# Patient Record
Sex: FEMALE | Race: WHITE | NOT HISPANIC OR LATINO | Employment: OTHER | URBAN - METROPOLITAN AREA
[De-identification: names, ages, dates, MRNs, and addresses within clinical notes are randomized per-mention and may not be internally consistent; named-entity substitution may affect disease eponyms.]

---

## 2023-03-06 DIAGNOSIS — R19.7 DIARRHEA, UNSPECIFIED TYPE: Primary | ICD-10-CM

## 2023-03-06 RX ORDER — DIPHENOXYLATE HYDROCHLORIDE AND ATROPINE SULFATE 2.5; .025 MG/1; MG/1
2 TABLET ORAL EVERY 4 HOURS PRN
COMMUNITY
Start: 2020-12-18 | End: 2023-03-06 | Stop reason: SDUPTHER

## 2023-03-06 RX ORDER — DIPHENOXYLATE HYDROCHLORIDE AND ATROPINE SULFATE 2.5; .025 MG/1; MG/1
2 TABLET ORAL 3 TIMES DAILY
Qty: 90 TABLET | Refills: 0 | Status: SHIPPED | OUTPATIENT
Start: 2023-03-06

## 2023-05-09 LAB
ANION GAP IN SER/PLAS: 14 MMOL/L (ref 10–20)
CALCIDIOL (25 OH VITAMIN D3) (NG/ML) IN SER/PLAS: 77 NG/ML
CALCIUM (MG/DL) IN SER/PLAS: 9.5 MG/DL (ref 8.6–10.3)
CARBON DIOXIDE, TOTAL (MMOL/L) IN SER/PLAS: 27 MMOL/L (ref 21–32)
CHLORIDE (MMOL/L) IN SER/PLAS: 95 MMOL/L (ref 98–107)
CREATININE (MG/DL) IN SER/PLAS: 0.75 MG/DL (ref 0.5–1.05)
GFR FEMALE: 85 ML/MIN/1.73M2
GLUCOSE (MG/DL) IN SER/PLAS: 89 MG/DL (ref 74–99)
POTASSIUM (MMOL/L) IN SER/PLAS: 4.5 MMOL/L (ref 3.5–5.3)
SODIUM (MMOL/L) IN SER/PLAS: 131 MMOL/L (ref 136–145)
THYROTROPIN (MIU/L) IN SER/PLAS BY DETECTION LIMIT <= 0.05 MIU/L: 0.56 MIU/L (ref 0.44–3.98)
UREA NITROGEN (MG/DL) IN SER/PLAS: 10 MG/DL (ref 6–23)

## 2023-05-17 PROBLEM — M75.52 BURSITIS OF LEFT SHOULDER: Status: ACTIVE | Noted: 2023-05-17

## 2023-05-17 PROBLEM — H91.90 HEARING LOSS: Status: ACTIVE | Noted: 2023-05-17

## 2023-05-17 PROBLEM — K21.9 ESOPHAGEAL REFLUX: Status: ACTIVE | Noted: 2023-05-17

## 2023-05-17 PROBLEM — I10 HYPERTENSION: Status: ACTIVE | Noted: 2023-05-17

## 2023-05-17 PROBLEM — M70.72 HIP BURSITIS, LEFT: Status: ACTIVE | Noted: 2023-05-17

## 2023-05-17 PROBLEM — E53.8 VITAMIN B12 DEFICIENCY: Status: ACTIVE | Noted: 2023-05-17

## 2023-05-17 PROBLEM — K76.0 FATTY INFILTRATION OF LIVER: Status: ACTIVE | Noted: 2023-05-17

## 2023-05-17 PROBLEM — M17.10 OSTEOARTHRITIS, LOCALIZED, KNEE: Status: ACTIVE | Noted: 2023-05-17

## 2023-05-17 PROBLEM — H34.8392 BRVO (BRANCH RETINAL VEIN OCCLUSION) (CMS-HCC): Status: ACTIVE | Noted: 2023-05-17

## 2023-05-17 PROBLEM — R73.09 INCREASED GLUCOSE LEVEL: Status: ACTIVE | Noted: 2023-05-17

## 2023-05-17 PROBLEM — M85.80 OSTEOPENIA: Status: ACTIVE | Noted: 2023-05-17

## 2023-05-17 PROBLEM — S23.9XXA THORACIC SPRAIN: Status: ACTIVE | Noted: 2023-05-17

## 2023-05-17 PROBLEM — K80.20 CHOLELITHIASIS: Status: ACTIVE | Noted: 2023-05-17

## 2023-05-17 PROBLEM — G47.00 INSOMNIA: Status: ACTIVE | Noted: 2023-05-17

## 2023-05-17 PROBLEM — H02.834 DERMATOCHALASIS OF LEFT UPPER EYELID: Status: ACTIVE | Noted: 2023-05-17

## 2023-05-17 PROBLEM — H18.529 CORNEAL EPITHELIAL AND BASEMENT MEMBRANE DYSTROPHY: Status: ACTIVE | Noted: 2023-05-17

## 2023-05-17 PROBLEM — L30.9 DERMATITIS: Status: ACTIVE | Noted: 2023-05-17

## 2023-05-17 PROBLEM — H60.92 OTITIS EXTERNA, LEFT: Status: ACTIVE | Noted: 2023-05-17

## 2023-05-17 PROBLEM — J30.9 ALLERGIC RHINITIS: Status: ACTIVE | Noted: 2023-05-17

## 2023-05-17 PROBLEM — Z86.010 HISTORY OF COLON POLYPS: Status: ACTIVE | Noted: 2023-05-17

## 2023-05-17 PROBLEM — K52.9 COLITIS, ACUTE: Status: ACTIVE | Noted: 2023-05-17

## 2023-05-17 PROBLEM — E78.5 HYPERLIPIDEMIA: Status: ACTIVE | Noted: 2023-05-17

## 2023-05-17 PROBLEM — M62.830 LUMBAR PARASPINAL MUSCLE SPASM: Status: ACTIVE | Noted: 2023-05-17

## 2023-05-17 PROBLEM — D64.9 ANEMIA: Status: ACTIVE | Noted: 2023-05-17

## 2023-05-17 PROBLEM — E66.3 OVERWEIGHT: Status: ACTIVE | Noted: 2023-05-17

## 2023-05-17 PROBLEM — M16.9 LOCALIZED OSTEOARTHRITIS OF HIP: Status: ACTIVE | Noted: 2023-05-17

## 2023-05-17 PROBLEM — S82.142A CLOSED FRACTURE OF LEFT TIBIAL PLATEAU: Status: ACTIVE | Noted: 2023-05-17

## 2023-05-17 PROBLEM — L30.9 ECZEMA: Status: ACTIVE | Noted: 2023-05-17

## 2023-05-17 PROBLEM — K58.2 MIXED IRRITABLE BOWEL SYNDROME: Status: ACTIVE | Noted: 2023-05-17

## 2023-05-17 PROBLEM — M25.812 SHOULDER IMPINGEMENT, LEFT: Status: ACTIVE | Noted: 2023-05-17

## 2023-05-17 PROBLEM — F41.8 SITUATIONAL ANXIETY: Status: ACTIVE | Noted: 2023-05-17

## 2023-05-17 PROBLEM — Z86.0100 HISTORY OF COLON POLYPS: Status: ACTIVE | Noted: 2023-05-17

## 2023-05-17 PROBLEM — F43.21 SITUATIONAL DEPRESSION: Status: ACTIVE | Noted: 2023-05-17

## 2023-05-17 PROBLEM — G89.18 POSTOPERATIVE PAIN: Status: ACTIVE | Noted: 2023-05-17

## 2023-05-17 PROBLEM — C44.91 BASAL CELL CARCINOMA: Status: ACTIVE | Noted: 2023-05-17

## 2023-05-17 PROBLEM — H90.3 SENSORINEURAL HEARING LOSS (SNHL) OF BOTH EARS: Status: ACTIVE | Noted: 2023-05-17

## 2023-05-17 PROBLEM — L71.9 ROSACEA: Status: ACTIVE | Noted: 2023-05-17

## 2023-05-17 PROBLEM — K61.0 PERIANAL ABSCESS: Status: ACTIVE | Noted: 2023-05-17

## 2023-05-17 PROBLEM — Z97.3 WEARS GLASSES: Status: ACTIVE | Noted: 2023-05-17

## 2023-05-17 PROBLEM — E03.9 HYPOTHYROIDISM: Status: ACTIVE | Noted: 2023-05-17

## 2023-05-17 PROBLEM — R11.0 NAUSEA IN ADULT: Status: ACTIVE | Noted: 2023-05-17

## 2023-05-17 PROBLEM — Z78.9 PATIENT HAS LIVING WILL: Status: ACTIVE | Noted: 2023-05-17

## 2023-05-17 PROBLEM — H02.831 DERMATOCHALASIS OF RIGHT UPPER EYELID: Status: ACTIVE | Noted: 2023-05-17

## 2023-05-17 PROBLEM — E55.9 VITAMIN D DEFICIENCY: Status: ACTIVE | Noted: 2023-05-17

## 2023-05-17 PROBLEM — K46.9 HERNIA, ABDOMINAL: Status: ACTIVE | Noted: 2023-05-17

## 2023-05-18 PROBLEM — E87.1 HYPONATREMIA: Status: ACTIVE | Noted: 2023-05-18

## 2023-05-19 RX ORDER — LANOLIN ALCOHOL/MO/W.PET/CERES
CREAM (GRAM) TOPICAL
COMMUNITY
Start: 2022-10-27 | End: 2023-05-22 | Stop reason: DRUGHIGH

## 2023-05-19 RX ORDER — ESCITALOPRAM OXALATE 10 MG/1
1 TABLET ORAL DAILY
COMMUNITY
Start: 2022-03-14 | End: 2023-08-07

## 2023-05-19 RX ORDER — LEVOTHYROXINE SODIUM 150 UG/1
TABLET ORAL
COMMUNITY
Start: 2012-09-10 | End: 2023-05-22 | Stop reason: SDUPTHER

## 2023-05-19 RX ORDER — ACETAMINOPHEN 500 MG
2 TABLET ORAL DAILY
COMMUNITY
Start: 2013-03-19

## 2023-05-19 RX ORDER — OMEPRAZOLE 40 MG/1
1 CAPSULE, DELAYED RELEASE ORAL 2 TIMES DAILY
COMMUNITY
Start: 2020-11-01 | End: 2024-03-12 | Stop reason: SDUPTHER

## 2023-05-19 RX ORDER — OMEGA-3/DHA/EPA/FISH OIL 300-1000MG
1 CAPSULE,DELAYED RELEASE (ENTERIC COATED) ORAL DAILY
COMMUNITY
Start: 2013-03-21 | End: 2024-04-19 | Stop reason: HOSPADM

## 2023-05-19 RX ORDER — NYSTATIN 100000 [USP'U]/G
POWDER TOPICAL
COMMUNITY
Start: 2020-03-04 | End: 2023-05-22 | Stop reason: ALTCHOICE

## 2023-05-19 RX ORDER — TACROLIMUS 1 MG/G
OINTMENT TOPICAL
COMMUNITY
Start: 2021-07-13 | End: 2024-04-05 | Stop reason: ENTERED-IN-ERROR

## 2023-05-19 RX ORDER — LORAZEPAM 0.5 MG/1
TABLET ORAL
COMMUNITY
Start: 2015-12-02 | End: 2023-05-22 | Stop reason: SDUPTHER

## 2023-05-19 RX ORDER — ATORVASTATIN CALCIUM 10 MG/1
1 TABLET, FILM COATED ORAL DAILY
COMMUNITY
Start: 2012-10-12 | End: 2024-02-06 | Stop reason: SDUPTHER

## 2023-05-19 RX ORDER — LISINOPRIL 10 MG/1
1 TABLET ORAL DAILY
COMMUNITY
Start: 2021-06-21 | End: 2023-06-26 | Stop reason: SDUPTHER

## 2023-05-19 RX ORDER — LEVOTHYROXINE SODIUM 125 UG/1
TABLET ORAL
COMMUNITY
Start: 2012-09-10 | End: 2023-12-12 | Stop reason: SDUPTHER

## 2023-05-19 RX ORDER — ONDANSETRON 4 MG/1
1 TABLET, FILM COATED ORAL EVERY 8 HOURS PRN
COMMUNITY
Start: 2020-03-17

## 2023-05-22 ENCOUNTER — OFFICE VISIT (OUTPATIENT)
Dept: PRIMARY CARE | Facility: CLINIC | Age: 73
End: 2023-05-22
Payer: MEDICARE

## 2023-05-22 VITALS
WEIGHT: 196.2 LBS | DIASTOLIC BLOOD PRESSURE: 83 MMHG | TEMPERATURE: 97.3 F | OXYGEN SATURATION: 96 % | SYSTOLIC BLOOD PRESSURE: 130 MMHG | BODY MASS INDEX: 33.49 KG/M2 | RESPIRATION RATE: 16 BRPM | HEIGHT: 64 IN | HEART RATE: 81 BPM

## 2023-05-22 DIAGNOSIS — E78.2 MIXED HYPERLIPIDEMIA: ICD-10-CM

## 2023-05-22 DIAGNOSIS — E53.8 VITAMIN B12 DEFICIENCY: ICD-10-CM

## 2023-05-22 DIAGNOSIS — E55.9 VITAMIN D DEFICIENCY: ICD-10-CM

## 2023-05-22 DIAGNOSIS — M85.89 OSTEOPENIA OF MULTIPLE SITES: ICD-10-CM

## 2023-05-22 DIAGNOSIS — E66.9 CLASS 1 OBESITY WITH SERIOUS COMORBIDITY AND BODY MASS INDEX (BMI) OF 34.0 TO 34.9 IN ADULT, UNSPECIFIED OBESITY TYPE: ICD-10-CM

## 2023-05-22 DIAGNOSIS — Z00.00 ROUTINE GENERAL MEDICAL EXAMINATION AT HEALTH CARE FACILITY: Primary | ICD-10-CM

## 2023-05-22 DIAGNOSIS — F40.298 FEAR OF FALLING: ICD-10-CM

## 2023-05-22 DIAGNOSIS — R03.0 ELEVATED BLOOD PRESSURE READING IN OFFICE WITHOUT DIAGNOSIS OF HYPERTENSION: ICD-10-CM

## 2023-05-22 DIAGNOSIS — M17.10 OSTEOARTHRITIS, LOCALIZED, KNEE: ICD-10-CM

## 2023-05-22 DIAGNOSIS — F43.21 SITUATIONAL DEPRESSION: ICD-10-CM

## 2023-05-22 DIAGNOSIS — E87.1 HYPONATREMIA: ICD-10-CM

## 2023-05-22 DIAGNOSIS — E03.9 ACQUIRED HYPOTHYROIDISM: ICD-10-CM

## 2023-05-22 PROBLEM — I10 BENIGN ESSENTIAL HYPERTENSION: Status: ACTIVE | Noted: 2019-12-18

## 2023-05-22 PROBLEM — M79.609 PAIN IN EXTREMITY: Status: ACTIVE | Noted: 2019-12-18

## 2023-05-22 PROBLEM — K43.2 VENTRAL INCISIONAL HERNIA: Status: ACTIVE | Noted: 2018-01-08

## 2023-05-22 PROBLEM — E87.6 HYPOKALEMIA: Status: RESOLVED | Noted: 2019-12-18 | Resolved: 2023-05-22

## 2023-05-22 PROBLEM — L57.4 SKIN LAXITY: Status: ACTIVE | Noted: 2018-03-16

## 2023-05-22 PROBLEM — L02.219 CELLULITIS AND ABSCESS OF TRUNK: Status: ACTIVE | Noted: 2019-12-18

## 2023-05-22 PROBLEM — L03.319 CELLULITIS AND ABSCESS OF TRUNK: Status: ACTIVE | Noted: 2019-12-18

## 2023-05-22 PROBLEM — E87.6 HYPOKALEMIA: Status: ACTIVE | Noted: 2019-12-18

## 2023-05-22 PROBLEM — Z98.890 H/O ABDOMINOPLASTY: Status: ACTIVE | Noted: 2018-04-11

## 2023-05-22 PROCEDURE — 1170F FXNL STATUS ASSESSED: CPT | Performed by: INTERNAL MEDICINE

## 2023-05-22 PROCEDURE — 3008F BODY MASS INDEX DOCD: CPT | Performed by: INTERNAL MEDICINE

## 2023-05-22 PROCEDURE — G0444 DEPRESSION SCREEN ANNUAL: HCPCS | Performed by: INTERNAL MEDICINE

## 2023-05-22 PROCEDURE — 3075F SYST BP GE 130 - 139MM HG: CPT | Performed by: INTERNAL MEDICINE

## 2023-05-22 PROCEDURE — 1160F RVW MEDS BY RX/DR IN RCRD: CPT | Performed by: INTERNAL MEDICINE

## 2023-05-22 PROCEDURE — 93000 ELECTROCARDIOGRAM COMPLETE: CPT | Performed by: INTERNAL MEDICINE

## 2023-05-22 PROCEDURE — 1159F MED LIST DOCD IN RCRD: CPT | Performed by: INTERNAL MEDICINE

## 2023-05-22 PROCEDURE — G0439 PPPS, SUBSEQ VISIT: HCPCS | Performed by: INTERNAL MEDICINE

## 2023-05-22 PROCEDURE — 1036F TOBACCO NON-USER: CPT | Performed by: INTERNAL MEDICINE

## 2023-05-22 PROCEDURE — 99214 OFFICE O/P EST MOD 30 MIN: CPT | Performed by: INTERNAL MEDICINE

## 2023-05-22 PROCEDURE — 3079F DIAST BP 80-89 MM HG: CPT | Performed by: INTERNAL MEDICINE

## 2023-05-22 RX ORDER — MULTIVITAMIN/IRON/FOLIC ACID 18MG-0.4MG
1 TABLET ORAL DAILY
COMMUNITY
End: 2024-03-12 | Stop reason: WASHOUT

## 2023-05-22 RX ORDER — LORAZEPAM 0.5 MG/1
0.5 TABLET ORAL DAILY PRN
Qty: 60 TABLET | Refills: 0 | Status: SHIPPED | OUTPATIENT
Start: 2023-05-22 | End: 2023-09-18 | Stop reason: SDUPTHER

## 2023-05-22 RX ORDER — POLYPODIUM LEUCOTOMOS EXTRACT 240 MG
1 CAPSULE ORAL DAILY
COMMUNITY

## 2023-05-22 RX ORDER — LANOLIN ALCOHOL/MO/W.PET/CERES
1000 CREAM (GRAM) TOPICAL DAILY
Status: SHIPPED | COMMUNITY
Start: 2023-05-22 | End: 2024-05-16

## 2023-05-22 ASSESSMENT — PATIENT HEALTH QUESTIONNAIRE - PHQ9
1. LITTLE INTEREST OR PLEASURE IN DOING THINGS: NOT AT ALL
2. FEELING DOWN, DEPRESSED OR HOPELESS: NOT AT ALL
1. LITTLE INTEREST OR PLEASURE IN DOING THINGS: NOT AT ALL
SUM OF ALL RESPONSES TO PHQ9 QUESTIONS 1 AND 2: 0
SUM OF ALL RESPONSES TO PHQ9 QUESTIONS 1 AND 2: 0
2. FEELING DOWN, DEPRESSED OR HOPELESS: NOT AT ALL

## 2023-05-22 ASSESSMENT — ANXIETY QUESTIONNAIRES
5. BEING SO RESTLESS THAT IT IS HARD TO SIT STILL: NOT AT ALL
IF YOU CHECKED OFF ANY PROBLEMS ON THIS QUESTIONNAIRE, HOW DIFFICULT HAVE THESE PROBLEMS MADE IT FOR YOU TO DO YOUR WORK, TAKE CARE OF THINGS AT HOME, OR GET ALONG WITH OTHER PEOPLE: SOMEWHAT DIFFICULT
GAD7 TOTAL SCORE: 3
3. WORRYING TOO MUCH ABOUT DIFFERENT THINGS: SEVERAL DAYS
7. FEELING AFRAID AS IF SOMETHING AWFUL MIGHT HAPPEN: SEVERAL DAYS
6. BECOMING EASILY ANNOYED OR IRRITABLE: NOT AT ALL
2. NOT BEING ABLE TO STOP OR CONTROL WORRYING: NOT AT ALL
1. FEELING NERVOUS, ANXIOUS, OR ON EDGE: SEVERAL DAYS
4. TROUBLE RELAXING: NOT AT ALL

## 2023-05-22 ASSESSMENT — ACTIVITIES OF DAILY LIVING (ADL)
GROOMING: INDEPENDENT
PATIENT'S MEMORY ADEQUATE TO SAFELY COMPLETE DAILY ACTIVITIES?: YES
ASSISTIVE_DEVICE: EYEGLASSES
JUDGMENT_ADEQUATE_SAFELY_COMPLETE_DAILY_ACTIVITIES: YES
WALKS IN HOME: INDEPENDENT
HEARING - RIGHT EAR: FUNCTIONAL
HEARING - LEFT EAR: FUNCTIONAL
BATHING: INDEPENDENT
ADEQUATE_TO_COMPLETE_ADL: YES
TOILETING: INDEPENDENT
DRESSING YOURSELF: INDEPENDENT
FEEDING YOURSELF: INDEPENDENT

## 2023-05-22 ASSESSMENT — ENCOUNTER SYMPTOMS
DEPRESSION: 0
LOSS OF SENSATION IN FEET: 0
OCCASIONAL FEELINGS OF UNSTEADINESS: 0

## 2023-05-22 NOTE — PROGRESS NOTES
"Subjective   Reason for Visit: Yamilex Ye is an 72 y.o. female here for a Medicare Wellness visit.     Past Medical, Surgical, and Family History reviewed and updated in chart.    Reviewed all medications by prescribing practitioner or clinical pharmacist (such as prescriptions, OTCs, herbal therapies and supplements) and documented in the medical record.    Here for follow up and wellness visit  Overall doing well. No illnesses  Fear of fallingcontinues to be a problem  Working w/ a  - Vel        Patient Care Team:  Bhavik Tristan MD as PCP - General  Bhavik Tristan MD as PCP - [facility] Attributed Provider     Review of Systems    Objective   Vitals:  /83 (BP Location: Left arm, Patient Position: Sitting, BP Cuff Size: Large adult)   Pulse 81   Temp 36.3 °C (97.3 °F)   Resp 16   Ht 1.613 m (5' 3.5\")   Wt 89 kg (196 lb 3.2 oz)   SpO2 96%   BMI 34.21 kg/m²       Physical Exam  Vitals reviewed.   Constitutional:       Appearance: Normal appearance.   HENT:      Head: Normocephalic and atraumatic.   Eyes:      General: No scleral icterus.        Right eye: No discharge.         Left eye: No discharge.      Extraocular Movements: Extraocular movements intact.      Conjunctiva/sclera: Conjunctivae normal.      Pupils: Pupils are equal, round, and reactive to light.   Cardiovascular:      Rate and Rhythm: Normal rate and regular rhythm.      Pulses: Normal pulses.      Heart sounds: Normal heart sounds. No murmur heard.  Pulmonary:      Effort: Pulmonary effort is normal.      Breath sounds: Normal breath sounds. No wheezing or rhonchi.   Musculoskeletal:         General: No deformity or signs of injury. Normal range of motion.      Cervical back: Normal range of motion and neck supple. No rigidity or tenderness.   Lymphadenopathy:      Cervical: No cervical adenopathy.   Skin:     General: Skin is warm and dry.      Findings: No rash.   Neurological:      General: No focal deficit present.      " Mental Status: She is alert and oriented to person, place, and time. Mental status is at baseline.      Cranial Nerves: No cranial nerve deficit.      Sensory: No sensory deficit.      Gait: Gait abnormal.      Comments: Walked cautiously   Psychiatric:         Mood and Affect: Mood normal.         Behavior: Behavior normal.         Thought Content: Thought content normal.         Judgment: Judgment normal.         Assessment/Plan   Problem List Items Addressed This Visit          Musculoskeletal    Osteoarthritis, localized, knee    Osteopenia       Endocrine/Metabolic    Hypothyroidism    Vitamin B12 deficiency    Vitamin D deficiency       Other    Hyperlipidemia    Situational depression    Hyponatremia    Relevant Orders    Follow Up In [facility] - PCP     Other Visit Diagnoses       Routine general medical examination at health care facility    -  Primary    Relevant Orders    1 Year Follow Up In [facility] - PCP - Wellness Exam    Class 1 obesity with serious comorbidity and body mass index (BMI) of 34.0 to 34.9 in adult, unspecified obesity type        Elevated blood pressure reading in office without diagnosis of hypertension        Relevant Orders    ECG 12 lead (Clinic Performed)    Follow Up In [facility] - PCP    Fear of falling        Relevant Medications    LORazepam (Ativan) 0.5 mg tablet               Benzodiazepines:  What is the patient's goal of therapy?   Improve anxiety  Is this being achieved with current treatment? Yes, with medication      CSA:  10/17/22   PDMP: 5/22/23  UDS:  12/20/2020   ADRIANE-7:   5/22/23  (score =3)    Activities of Daily Living:   Is your overall impression that this patient is benefiting (symptom reduction outweighs side effects) from benzodiazepine therapy? Yes     1. Physical Functioning: Better  2. Family Relationship: Better  3. Social Relationship: Better  4. Mood: Better  5. Sleep Patterns: Better  6. Overall Function:  Better      Hypertension/ dyslipidemia/elevated glucose/elevated weight-presently blood pressure appears controlled with present medication. We'll continue to follow each visit, with surveillance chemistry labs regularly.             BP improved on recheck.     Hyponatremia- mild; will follow     Elevated 10 year cardiac risk assessment- 10 year cardiac risk assessment at 9.9% January 2019 reduces to 7.4% taking statin, 5.4% with statin and blood pressure Rx. She will continue lisinopril and atorvastatin. She will continue exercise when her schedule permits     Exercise routine-she will advance as tolerated.    She restarted with her . She has joined the gym.    Her  switched gyms and she will be starting kick boxing with a new instructor.    With present knee physical therapy, she has put regular exercising on hold.              She has been using a - Vel, and hopes to restart Silver Sneakers when she is able tolerate it.   Encouraged water walking 3 x wk at [facility]'s pool     Left knee injury-November 2022-she has worked with Dr. Israel who did an MRI which shows nondisplaced tibial fracture at the insertion of the PCL, possibly a meniscal tear as well as patellar arthritis. She is doing physical therapy. Presently using a cane. She will continue caution. Handicap parking placard provided January 2023            Working w/  in 5/23 -Vel - including lower and upper body weights     Recurrent diarrhea alternating with constipation- initially diagnosed as colitis- noted on early 2020 colonoscopy per Dr. Ibarra. Lexapro was discontinued. She has used Lomotil as needed from Dr. Ibarra. Colonoscopy updated January 2021- significantly improved she states.  With less stress, there is less diarrhea she notes. Overall improved she still uses the Lomotil occasionally. Encouraged dietary caution with her upcoming plans to visit a [place] restaurant.                  She saw Dr. Strickland early May '23. He rec'd more metamucil - now on 2 cap/day. Colonoscopy planned for . Mornings still challenging w/ am diarrhea    Acid reflux-stable with PPI- which she will continue especially as she is on the naproxen           Anticipate EGD in .  Smaller meals help     Cholelithiasis- asymptomatic     Lumbar spasms-mainly in the SI joint area-she will get back to her physical therapist Presley for exercises     Family history of colon cancer-she will continue colonoscopy care as below-at least every 5 years.    Colonoscopy updated 2021. Next in 3 years-2024     Situational anxiety/caregiver stress/ depression-very difficult for several years with her 's dementia declining course and subsequent passing. Support provided. She will continue the additional Lexapro as ordered.As required by state law, a [place] Automated Rx Reporting System ([place]ARRS) report was requested, reviewed & filed. No concerning findings noted on the [employer] [facility] [employer] drug contract updated 10/17/22.    Her   mid 2022. Fortunately she was able to go on a vacation with her cousins to [facility] . It all went really well. She is getting back into routine presently and adjusting to the new normal.    Graduated from psychology therapy. She was advised to start spending time around people by her therapist.. She plans to spend a bit more time with friends at the [employer]. Unfortunately with her knee she cannot do [place] dancing that but at some point might. She is hopeful to start volunteering at the local hospital.           Doing better, but lonely often. Hard to meet companions.  She no longer sees her counselor. Support provided        History of Right lower quadrant abdominal wall hernia-surgery w/ Dr. Ravindra Louise, and Dr. Finnegan at [facility] . Improved     Lymphedema- occasional while flying. Uses compression hoses. Not  problematic at this time.      Hypothyroidism-we'll continue to follow thyroid labs with present thyroid supplement prescription.      Gynecologic care / history of vaginal hysterectomy she will follow-up with GYN as needed     Annual mammogram- updated December 2022.      Vitamin D deficiency- encouraged patient to continue vitamin D daily as ordered. Will consider vitamin D level regularly.     Vitamin B12 deficiency- improved on 01/23 labs.             B 12 still high in 5/23 - she'll reduce B 12 to every other day        S/p lumbar surgery 4/17/19 per Dr. Ward for L 5 cyst--then her second lumbar surgery July 2020 laminectomy. Ongoing pain has been challenging. She will continue exercises, and follow-up with her physical therapist Presley as needed. She will see Dr. Ward as well. She will continue stretching.   X-rays completed per Dr. Ward. Unremarkable.         Seasonal allergies-she will use Zyrtec seasonally as needed        History of skin cancer/rosacea- she used to follow with Dr. Georgie Oliveira regularly as recommended with her history of Mohs surgery, she understands importance of sunscreen   She continues to follow at [facility]-now working with Dr. Connie Kelly who does laser treatments on her cheeks mainly presently.   Presently taking Heliocare and reports no recurrence of skin cancer. She will continue.      Ophthalmology care-she will see Dr. Cade as scheduled. Sadly her right eye has chronically poor vision from a remote retinal vein occlusion. The left eye has dry eyes and she may need a keratotomy and contact lens implanted permanently. She will continue work with Dr. Cade. Support provided   She was told that she has Sjogren's. She had some difficulty passing her 's license exam. She will review her left eye vision again with Dr. Cade   Right cataract surgery completed 12/22, with noticeably better vision. Left scheduled for 2/23. Left blepharoplasty scheduled for 3/23.  Vision improved and she is pleased with the results at this. She will continue with Dr. Cade- now at [facility]     Hearing concerns- audiology evaluation with audiology and ENT completed 12/22.        Bereavement- unfortunately her  passed away from dementia in 8/22. Support provided. We spoke at length. She has some friends for support, and plans to do some activities with the local [place] club.        Flu shot each fall- updated 9/22     Covid series-completed. Covid Booster completed. Additional booster shot updated 9/22     Shingrix series completed    Tdap before 2024     Follow-up in 3 months, sooner with concerns     Charting was completed using voice recognition technology and may include unintended errors.

## 2023-06-26 DIAGNOSIS — I10 PRIMARY HYPERTENSION: Primary | ICD-10-CM

## 2023-06-27 ENCOUNTER — HOSPITAL ENCOUNTER (OUTPATIENT)
Dept: DATA CONVERSION | Facility: HOSPITAL | Age: 73
End: 2023-06-27
Attending: OPHTHALMOLOGY | Admitting: OPHTHALMOLOGY
Payer: MEDICARE

## 2023-06-27 DIAGNOSIS — I10 ESSENTIAL (PRIMARY) HYPERTENSION: ICD-10-CM

## 2023-06-27 DIAGNOSIS — Z87.891 PERSONAL HISTORY OF NICOTINE DEPENDENCE: ICD-10-CM

## 2023-06-27 DIAGNOSIS — E03.9 HYPOTHYROIDISM, UNSPECIFIED: ICD-10-CM

## 2023-06-27 DIAGNOSIS — H02.839 DERMATOCHALASIS OF UNSPECIFIED EYE, UNSPECIFIED EYELID: ICD-10-CM

## 2023-06-27 DIAGNOSIS — H02.834 DERMATOCHALASIS OF LEFT UPPER EYELID: ICD-10-CM

## 2023-06-27 DIAGNOSIS — K21.9 GASTRO-ESOPHAGEAL REFLUX DISEASE WITHOUT ESOPHAGITIS: ICD-10-CM

## 2023-06-27 DIAGNOSIS — D64.9 ANEMIA, UNSPECIFIED: ICD-10-CM

## 2023-06-27 DIAGNOSIS — E66.9 OBESITY, UNSPECIFIED: ICD-10-CM

## 2023-06-27 DIAGNOSIS — H02.831 DERMATOCHALASIS OF RIGHT UPPER EYELID: ICD-10-CM

## 2023-06-27 DIAGNOSIS — E78.5 HYPERLIPIDEMIA, UNSPECIFIED: ICD-10-CM

## 2023-06-27 RX ORDER — LISINOPRIL 10 MG/1
10 TABLET ORAL DAILY
Qty: 90 TABLET | Refills: 3 | Status: SHIPPED | OUTPATIENT
Start: 2023-06-27 | End: 2024-03-12 | Stop reason: SDUPTHER

## 2023-08-07 DIAGNOSIS — F41.8 SITUATIONAL ANXIETY: Primary | ICD-10-CM

## 2023-08-07 RX ORDER — ESCITALOPRAM OXALATE 20 MG/1
20 TABLET ORAL DAILY
Qty: 90 TABLET | Refills: 1 | Status: SHIPPED | OUTPATIENT
Start: 2023-08-07 | End: 2023-08-08 | Stop reason: SDUPTHER

## 2023-08-08 DIAGNOSIS — F41.8 SITUATIONAL ANXIETY: ICD-10-CM

## 2023-08-08 RX ORDER — ESCITALOPRAM OXALATE 20 MG/1
20 TABLET ORAL DAILY
Qty: 90 TABLET | Refills: 1 | Status: SHIPPED | OUTPATIENT
Start: 2023-08-08 | End: 2024-03-12 | Stop reason: SDUPTHER

## 2023-09-01 ENCOUNTER — APPOINTMENT (OUTPATIENT)
Dept: PRIMARY CARE | Facility: CLINIC | Age: 73
End: 2023-09-01
Payer: MEDICARE

## 2023-09-18 ENCOUNTER — OFFICE VISIT (OUTPATIENT)
Dept: PRIMARY CARE | Facility: CLINIC | Age: 73
End: 2023-09-18
Payer: MEDICARE

## 2023-09-18 VITALS
DIASTOLIC BLOOD PRESSURE: 77 MMHG | OXYGEN SATURATION: 96 % | BODY MASS INDEX: 33.13 KG/M2 | SYSTOLIC BLOOD PRESSURE: 127 MMHG | WEIGHT: 190 LBS | RESPIRATION RATE: 16 BRPM | HEART RATE: 83 BPM | TEMPERATURE: 98.6 F

## 2023-09-18 DIAGNOSIS — E03.9 ACQUIRED HYPOTHYROIDISM: Chronic | ICD-10-CM

## 2023-09-18 DIAGNOSIS — F40.298 FEAR OF FALLING: Chronic | ICD-10-CM

## 2023-09-18 DIAGNOSIS — E87.1 HYPONATREMIA: Chronic | ICD-10-CM

## 2023-09-18 DIAGNOSIS — R03.0 ELEVATED BLOOD PRESSURE READING IN OFFICE WITHOUT DIAGNOSIS OF HYPERTENSION: Chronic | ICD-10-CM

## 2023-09-18 DIAGNOSIS — E55.9 VITAMIN D DEFICIENCY: Primary | Chronic | ICD-10-CM

## 2023-09-18 DIAGNOSIS — R19.7 DIARRHEA, UNSPECIFIED TYPE: Chronic | ICD-10-CM

## 2023-09-18 DIAGNOSIS — R73.09 ELEVATED GLUCOSE: Chronic | ICD-10-CM

## 2023-09-18 DIAGNOSIS — E53.8 VITAMIN B12 DEFICIENCY: Chronic | ICD-10-CM

## 2023-09-18 DIAGNOSIS — E78.5 DYSLIPIDEMIA, GOAL LDL BELOW 100: Chronic | ICD-10-CM

## 2023-09-18 PROBLEM — Z96.1 PSEUDOPHAKIA: Status: ACTIVE | Noted: 2023-01-17

## 2023-09-18 LAB
AMPHETAMINE SCREEN BLOOD: NEGATIVE NG/ML
BARBITURATE SCREEN BLOOD: NEGATIVE NG/ML
BENZODIAZEPINES SCREEN BLOOD: NEGATIVE NG/ML
BUPRENORPHINE SCREEN BLOOD: NEGATIVE NG/ML
CANNABINOIDS SCREEN BLOOD: NEGATIVE NG/ML
COCAINE SCREEN BLOOD: NEGATIVE NG/ML
DRUG SCREEN COMMENT BLOOD: NORMAL
METHADONE SCREEN BLOOD: NEGATIVE NG/ML
METHAMPHETAMINE, BLOOD, SCREEN: NEGATIVE NG/ML
OPIATE SCREEN BLOOD: NEGATIVE NG/ML
OXYCODONE SCREEN BLOOD: NEGATIVE NG/ML
PHENCYCLIDINE SCREEN BLOOD: NEGATIVE NG/ML

## 2023-09-18 PROCEDURE — 3074F SYST BP LT 130 MM HG: CPT | Performed by: INTERNAL MEDICINE

## 2023-09-18 PROCEDURE — 99214 OFFICE O/P EST MOD 30 MIN: CPT | Performed by: INTERNAL MEDICINE

## 2023-09-18 PROCEDURE — 1160F RVW MEDS BY RX/DR IN RCRD: CPT | Performed by: INTERNAL MEDICINE

## 2023-09-18 PROCEDURE — 1126F AMNT PAIN NOTED NONE PRSNT: CPT | Performed by: INTERNAL MEDICINE

## 2023-09-18 PROCEDURE — 1036F TOBACCO NON-USER: CPT | Performed by: INTERNAL MEDICINE

## 2023-09-18 PROCEDURE — 1159F MED LIST DOCD IN RCRD: CPT | Performed by: INTERNAL MEDICINE

## 2023-09-18 PROCEDURE — 3008F BODY MASS INDEX DOCD: CPT | Performed by: INTERNAL MEDICINE

## 2023-09-18 PROCEDURE — 3078F DIAST BP <80 MM HG: CPT | Performed by: INTERNAL MEDICINE

## 2023-09-18 RX ORDER — LORAZEPAM 0.5 MG/1
0.5 TABLET ORAL DAILY PRN
Qty: 60 TABLET | Refills: 0 | Status: SHIPPED | OUTPATIENT
Start: 2023-09-18 | End: 2024-01-29 | Stop reason: SDUPTHER

## 2023-09-18 RX ORDER — DIPHENOXYLATE HYDROCHLORIDE AND ATROPINE SULFATE 2.5; .025 MG/1; MG/1
2 TABLET ORAL 3 TIMES DAILY
Qty: 90 TABLET | Refills: 0 | Status: CANCELLED | OUTPATIENT
Start: 2023-09-18

## 2023-09-18 RX ORDER — DOXYCYCLINE 50 MG/1
50 TABLET ORAL 2 TIMES DAILY
COMMUNITY
End: 2023-12-21 | Stop reason: WASHOUT

## 2023-09-18 ASSESSMENT — ENCOUNTER SYMPTOMS
LOSS OF SENSATION IN FEET: 0
DEPRESSION: 0
OCCASIONAL FEELINGS OF UNSTEADINESS: 1

## 2023-09-18 ASSESSMENT — PATIENT HEALTH QUESTIONNAIRE - PHQ9
SUM OF ALL RESPONSES TO PHQ9 QUESTIONS 1 AND 2: 0
1. LITTLE INTEREST OR PLEASURE IN DOING THINGS: NOT AT ALL
2. FEELING DOWN, DEPRESSED OR HOPELESS: NOT AT ALL

## 2023-09-18 NOTE — PROGRESS NOTES
Subjective   Patient ID: Yamilex Ye is a 73 y.o. female who presents for Follow-up.    Here for quarterly follow-up  For the most part doing well.  Right knee however was injured back in June-she received an injection and a brace with her orthopedist, Dr. Israel  She did well until another injury when she tripped at the baseball game she has been going to her  as well as physical therapy, Presley  The right knee continues to swell    Anxiety remains an issue-she admits that she just feels lonely.  Finding it difficult to get out and meet people as she has anxiousness driving         Review of Systems    Objective   /77 (BP Location: Left arm, Patient Position: Sitting, BP Cuff Size: Large adult)   Pulse 83   Temp 37 °C (98.6 °F)   Resp 16   Wt 86.2 kg (190 lb)   SpO2 96%   BMI 33.13 kg/m²     Physical Exam    Assessment/Plan   Problem List Items Addressed This Visit       Dyslipidemia, goal LDL below 100    Relevant Orders    Lipid Panel    Alanine Aminotransferase    Hypothyroidism    Relevant Orders    TSH with reflex to Free T4 if abnormal    Elevated glucose    Relevant Orders    Hemoglobin A1C    Vitamin B12 deficiency    Relevant Orders    CBC    Vitamin B12    Vitamin D deficiency - Primary    Relevant Orders    Vitamin D 25-Hydroxy,Total (for eval of Vitamin D levels)    Hyponatremia    Relevant Orders    Basic Metabolic Panel    Elevated blood pressure reading in office without diagnosis of hypertension    Relevant Orders    Basic Metabolic Panel    Diarrhea    Fear of falling    Relevant Medications    LORazepam (Ativan) 0.5 mg tablet        enzodiazepines:  What is the patient's goal of therapy?   Improve anxiety  Is this being achieved with current treatment? Yes, with medication      CSA:  9/18/23   PDMP: 9/18/23  UDS:  9/14/23   ADRIANE-7:   5/22/23  (score =3)    Activities of Daily Living:   Is your overall impression that this patient is benefiting (symptom reduction outweighs  side effects) from benzodiazepine therapy? Yes     1. Physical Functioning: Better  2. Family Relationship: Better  3. Social Relationship: Better  4. Mood: Better  5. Sleep Patterns: Better  6. Overall Function: Better    Situational anxiety/caregiver stress/ depression-very difficult for several years with her 's dementia declining course and subsequent passing. Support provided. She will continue the additional Lexapro as ordered. Her   mid 2022. She was advised to start spending time around people by her therapist.. She plans to spend a bit more time with friends at the [employer]. Unfortunately with her knee she cannot do [place] dancing that but at some point might.           Doing better, but lonely often. Hard to meet companions.  She no longer sees her counselor. Support provided.  She does feel lonely.  She does not want to do anything in medical she states.  Suggested volunteering with people as she enjoys being around people.        Hypertension/ dyslipidemia/elevated glucose/elevated weight-presently blood pressure appears controlled with present medication. We'll continue to follow each visit, with surveillance chemistry labs regularly.             BP improved on recheck.     Hyponatremia- mild; will follow     Elevated 10 year cardiac risk assessment- 10 year cardiac risk assessment at 9.9% 2019 reduces to 7.4% taking statin, 5.4% with statin and blood pressure Rx. She will continue lisinopril and atorvastatin. She will continue exercise when her schedule permits     Exercise routine-she will advance as tolerated.              She has been using a - Vel, and hopes to restart Silver Sneakers when she is able tolerate it.  In the past-encouraged water walking 3 x wk at [facility]'s pool              Exercise on hold with her right knee injury summer 2023    Right knee injury--improved after injection with Dr. Babcock  provided-unfortunately she injured it again later in the summer at a baseball game          9/23-the right knee remains sore despite exercises with her , Vel and physical therapy with Presley.  I encouraged her to get back to Dr. Israel     Left knee injury-November 2022-she has worked with Dr. Israel who did an MRI which shows nondisplaced tibial fracture at the insertion of the PCL, possibly a meniscal tear as well as patellar arthritis. She is doing physical therapy. Presently using a cane. She will continue caution. Handicap parking placard provided January 2023               Recurrent diarrhea alternating with constipation- initially diagnosed as colitis- noted on early 2020 colonoscopy per Dr. Ibarra. Lexapro was discontinued. She has used Lomotil as needed from Dr. Ibarra. Colonoscopy updated January 2021- significantly improved she states.  With less stress, there is less diarrhea she notes. Overall improved she still uses the Lomotil occasionally. Encouraged dietary caution with her upcoming plans to visit a [place] restaurant.                 She saw Dr. Strickland early May '23. He rec'd more metamucil - now on 2 cap/day. Colonoscopy planned for 2024. Mornings still challenging w/ am diarrhea.  She uses Lomotil-she will get refills from him.  Anticipates a colonoscopy in the spring 2024    Acid reflux-stable with PPI- which she will continue especially as she is on the naproxen           Anticipate EGD in 2024.  Smaller meals help     Cholelithiasis- asymptomatic     Family history of colon cancer-she will continue colonoscopy care as below-at least every 5 years.    Colonoscopy updated January 2021. Next in 3 years-January 2024    Lumbar spasms-mainly in the SI joint area-improved after working with her physical therapist, Presley for exercises    S/p lumbar surgery 4/17/19 per Dr. Ward for L 5 cyst--then her second lumbar surgery July 2020 laminectomy. Ongoing pain has been challenging. She will  continue exercises, and follow-up with her physical therapist Presley as needed. She will see Dr. Ward as well. She will continue stretching.   X-rays completed per Dr. Ward. Unremarkable.        History of Right lower quadrant abdominal wall hernia-surgery w/ Dr. Ravindra Louise, and Dr. Finnegan at [facility] Feb. 18. Improved     Lymphedema- occasional while flying. Uses compression hoses. Not problematic at this time.      Hypothyroidism-we'll continue to follow thyroid labs with present thyroid supplement prescription.      Gynecologic care / history of vaginal hysterectomy she will follow-up with GYN as needed     Annual mammogram- updated December 2022.      Vitamin D deficiency- encouraged patient to continue vitamin D daily as ordered. Will consider vitamin D level regularly.     Vitamin B12 deficiency- improved on 01/23 labs.             B 12 still high in 5/23 - she'll reduce B 12 to every other day     Seasonal allergies-she will use Zyrtec seasonally as needed        History of skin cancer/rosacea-  history of Mohs surgery, she understands importance of sunscreen   She continues to follow at [facility]-now working with Dr. Connie Kelly who does laser treatments on her cheeks mainly presently.      Ophthalmology care-she will see Dr. Cade as scheduled. Sadly her right eye has chronically poor vision from a remote retinal vein occlusion. The left eye has dry eyes and she may need a keratotomy and contact lens implanted permanently.  Right cataract surgery completed 12/22, with noticeably better vision. Left scheduled for 2/23. Left blepharoplasty  3/23 per Dr. Lloyd.             Vision improved and she is pleased with the results at this. She will continue with Dr. Cade- now at [facility]     Hearing concerns- audiology evaluation with audiology and ENT completed 12/22.      Flu shot each fall- updated 9/22     Covid series-completed. Covid Booster completed. Additional booster shot  updated 9/22     Shingrix series completed    Tdap before 2024     Follow-up in 3 months, sooner with concerns     Charting was completed using voice recognition technology and may include unintended errors.

## 2023-09-19 PROBLEM — R03.0 ELEVATED BLOOD PRESSURE READING IN OFFICE WITHOUT DIAGNOSIS OF HYPERTENSION: Status: ACTIVE | Noted: 2023-09-19

## 2023-09-19 PROBLEM — F40.298 FEAR OF FALLING: Status: ACTIVE | Noted: 2023-09-19

## 2023-09-19 PROBLEM — R19.7 DIARRHEA: Status: ACTIVE | Noted: 2023-09-19

## 2023-09-30 NOTE — H&P
History of Present Illness:   History Present Illness:  Reason for surgery: Bilateral upper eyelid dermatochalasis   HPI:    Pt is a 72 YOM presenting for Bilateral upper eyelid blepharoplasty.    Allergies:        Allergies:  ·  NKDA :   ·  Tape  - Adhesive, Bandaids, Paper: Rash    Home Medication Review:   Home Medications Reviewed: yes     Impression/Procedure:   ·  Impression and Planned Procedure: Bilateral upper eyelid blepharoplasty       ERAS (Enhanced Recovery After Surgery):  ·  ERAS Patient: no       Physical Exam by System:    Constitutional: Well developed, awake/alert/oriented  x3, no distress, alert and cooperative   Eyes: PERRL, EOMI, clear sclera   Head/Neck: Neck supple   Respiratory/Thorax: Normal WOB   Cardiovascular: Ascultation per anesthesia   Gastrointestinal: abdomen nondistended   Neurological: alert and oriented x3   Psychological: Appropriate mood and behavior   Skin: Warm and dry, no lesions, no rashes     Consent:   COVID-19 Consent:  ·  COVID-19 Risk Consent Surgeon has reviewed key risks related to the risk of maricruz COVID-19 and if they contract COVID-19 what the risks are.     Attestation:   Note Completion:  I am a:  Resident/Fellow   Attending Attestation I saw and evaluated the patient.  I personally obtained the key and critical portions of the history and physical exam or was physically present for key and  critical portions performed by the resident/fellow. I reviewed the resident/fellow?s documentation and discussed the patient with the resident/fellow.  I agree with the resident/fellow?s medical decision making as documented in the note.     I personally evaluated the patient on 27-Jun-2023         Electronic Signatures:  Dg Lloyd)  (Signed 25-Jul-2023 10:56)   Authored: Note Completion  Shannon Reeder (Resident))  (Signed 27-Jun-2023 07:21)   Authored: History of Present Illness, Allergies, Home  Medication Review, Impression/Procedure, ERAS, Physical  Exam, Consent, Note Completion      Last Updated: 25-Jul-2023 10:56 by Dg Lloyd)

## 2023-10-02 NOTE — OP NOTE
Post Operative Note:     PreOp Diagnosis: Dermatochalasis bilateral upper  eyelids   Post-Procedure Diagnosis: same   Procedure: 1. Bilateral upper-lid blepharoplasty   Surgeon: Dg Lloyd MD   Resident/Fellow/Other Assistant: Shannon Reeder MD;  Juan Garduno MD PhD   Anesthesia: MAC   Estimated Blood Loss (mL): <5cc   Specimen: no   Complications: none   Findings: na     Operative Report Dictated:  Dictation: not applicable - note contains Operative  Report   Operative Report:        Description of the procedure: The patient was taken to the operating room and placed on the table in the supine position, where anesthesia  was induced. 2% lidocaine with epinephrine and 0.5% marcaine in a 1:1 fashion was injected over the surgical site, and the patient was prepped and draped in the usual manner for orbitofacial surgery.      A 15 Bard-Leif blade incision was made 8  mm from the eyelash margin, across the entire horizontal extent of the right upper eyelid. A second incision was made 12 mm inferior  to the junction of the brow and eyelid, and a pinch test was used to ensure the amount of skin excision was appropriate. The intervening tissue was excised with a 15 Bard-Leif blade and Benny scissors. Excessive orbicularis tissue was removed. Bleeding  was controlled with electrocauterization. The skin was then closed with 5-0 fast absorbing suture in an interrupted fashion with care to incorporate the pretarsal orbicularis over the nasal and  tmeporal limbus, followed by a running 6-0 prolene suture. The lid position and contour were examined and found to be satisfactory.      The exact same procedure was preformed over the contralateral upper lid.      Topical antibiotic was placed and the patient was delivered to PACU in stable condition having tolerated the procedure well without complications.          Attestation:   Note Completion:  I am a: Resident/Fellow   Attending Attestation I was present for the  entire procedure          Electronic Signatures:  Juan Garduno (Resident))  (Signed 27-Jun-2023 09:33)   Authored: Post Operative Note, Note Completion  Dg Lloyd)  (Signed 27-Jun-2023 18:22)   Authored: Post Operative Note, Note Completion   Co-Signer: Post Operative Note, Note Completion      Last Updated: 27-Jun-2023 18:22 by Dg Lloyd)

## 2023-10-10 ENCOUNTER — TELEMEDICINE (OUTPATIENT)
Dept: PRIMARY CARE | Facility: CLINIC | Age: 73
End: 2023-10-10
Payer: MEDICARE

## 2023-10-10 ENCOUNTER — TELEPHONE (OUTPATIENT)
Dept: PRIMARY CARE | Facility: CLINIC | Age: 73
End: 2023-10-10

## 2023-10-10 DIAGNOSIS — J98.8 RESPIRATORY TRACT INFECTION DUE TO COVID-19 VIRUS: Primary | ICD-10-CM

## 2023-10-10 DIAGNOSIS — U07.1 RESPIRATORY TRACT INFECTION DUE TO COVID-19 VIRUS: Primary | ICD-10-CM

## 2023-10-10 PROBLEM — H43.813 VITREOUS DEGENERATION OF BOTH EYES: Status: ACTIVE | Noted: 2023-09-06

## 2023-10-10 PROCEDURE — 99213 OFFICE O/P EST LOW 20 MIN: CPT | Performed by: INTERNAL MEDICINE

## 2023-10-10 RX ORDER — PREDNISONE 10 MG/1
TABLET ORAL
Qty: 20 TABLET | Refills: 0 | Status: SHIPPED | OUTPATIENT
Start: 2023-10-10 | End: 2023-12-21 | Stop reason: ALTCHOICE

## 2023-10-10 RX ORDER — DOXYCYCLINE HYCLATE 20 MG
20 TABLET ORAL 2 TIMES DAILY
COMMUNITY
Start: 2023-10-05 | End: 2024-04-05 | Stop reason: ENTERED-IN-ERROR

## 2023-10-10 RX ORDER — METRONIDAZOLE 7.5 MG/G
1 GEL TOPICAL 2 TIMES DAILY
COMMUNITY
Start: 2023-10-06

## 2023-10-10 RX ORDER — FLUTICASONE PROPIONATE 50 MCG
SPRAY, SUSPENSION (ML) NASAL
Qty: 16 G | Refills: 3 | Status: SHIPPED | OUTPATIENT
Start: 2023-10-10 | End: 2024-04-19 | Stop reason: HOSPADM

## 2023-10-10 ASSESSMENT — PATIENT HEALTH QUESTIONNAIRE - PHQ9
SUM OF ALL RESPONSES TO PHQ9 QUESTIONS 1 AND 2: 0
2. FEELING DOWN, DEPRESSED OR HOPELESS: NOT AT ALL
1. LITTLE INTEREST OR PLEASURE IN DOING THINGS: NOT AT ALL

## 2023-10-10 NOTE — TELEPHONE ENCOUNTER
Patient just wanted to let  Know that she tested positive for COVID this morning. Patient states that her symptoms are currently mild, but was advised if anything changes ie: symptoms get worse or any struggle with breathing difficulty to go to ER. Patient denies any high fever. CATALINA

## 2023-10-10 NOTE — PROGRESS NOTES
Subjective   Patient ID: Yamilex Ye is a 73 y.o. female who presents for URI (Covid positive/).    Virtual or Telephone Consent    An interactive audio and video telecommunication system which permits real time communications between the patient (at the originating site) and provider (at the distant site) was utilized to provide this telehealth service.   Verbal consent was requested and obtained from Yamilex Ye on this date, 10/10/23 for a telehealth visit.     Patient called-tested positive for COVID yesterday worsening cough congestion temperature to 100.4.  Using Tylenol      URI          Review of Systems    Objective   There were no vitals taken for this visit.    Physical Exam  Vitals reviewed.   Constitutional:       Appearance: Normal appearance.      Comments: Well-appearing adult in no distress, alert, appearance at baseline  Virtual exam showed patient to be alert active and otherwise appropriate  Spoke normally, with speech at baseline, without evidence of respiratory distress  Facial exam, unremarkable with no obvious eye findings  Skin exam without any obvious rash or notable findings  Mental status exam-appropriate without any worrisome findings, with normal mood and thought content     Neurological:      Mental Status: She is alert.         Assessment/Plan   Problem List Items Addressed This Visit    None  Visit Diagnoses         Codes    Respiratory tract infection due to COVID-19 virus    -  Primary U07.1, J98.8    Relevant Medications    fluticasone (Flonase) 50 mcg/actuation nasal spray    nirmatrelvir-ritonavir (PAXLOVID) 300 mg (150 mg x 2)-100 mg tablet therapy pack    predniSONE (Deltasone) 10 mg tablet    albuterol 90 mcg/actuation aerosol powdr breath activated inhaler          COVID respiratory illness-reviewed measures to help with supportive care.  Specifically encouraged ample fluids to restore hydration and to prevent further dehydration.  Tylenol 325, 3 pills 4 times  daily, for 3- 4 days, then as needed encouraged.  Flonase nasal spray, 2 sprays twice daily for 2 to 3 weeks till postnasal drip congestion and cough clear.  Antiviral ordered per protocol.  She will hold the atorvastatin while on Paxlovid.    For her reactive airway component-she will use a prednisone taper and albuterol as needed.      Finally quarantine for 5 days, and if symptoms resolved and test negative for COVID, okay to leave quarantine with a mask until 10 days.  Call with any concerns or questions

## 2023-11-20 ENCOUNTER — TELEPHONE (OUTPATIENT)
Dept: PRIMARY CARE | Facility: CLINIC | Age: 73
End: 2023-11-20
Payer: MEDICARE

## 2023-11-29 ENCOUNTER — LAB (OUTPATIENT)
Dept: LAB | Facility: LAB | Age: 73
End: 2023-11-29
Payer: MEDICARE

## 2023-11-29 DIAGNOSIS — E53.8 VITAMIN B12 DEFICIENCY: Chronic | ICD-10-CM

## 2023-11-29 DIAGNOSIS — D64.9 ANEMIA, UNSPECIFIED TYPE: Primary | ICD-10-CM

## 2023-11-29 DIAGNOSIS — E78.5 DYSLIPIDEMIA, GOAL LDL BELOW 100: Chronic | ICD-10-CM

## 2023-11-29 DIAGNOSIS — R73.09 ELEVATED GLUCOSE: Chronic | ICD-10-CM

## 2023-11-29 DIAGNOSIS — D64.9 ANEMIA, UNSPECIFIED TYPE: ICD-10-CM

## 2023-11-29 DIAGNOSIS — E55.9 VITAMIN D DEFICIENCY: Chronic | ICD-10-CM

## 2023-11-29 DIAGNOSIS — R03.0 ELEVATED BLOOD PRESSURE READING IN OFFICE WITHOUT DIAGNOSIS OF HYPERTENSION: Chronic | ICD-10-CM

## 2023-11-29 DIAGNOSIS — E03.9 ACQUIRED HYPOTHYROIDISM: Chronic | ICD-10-CM

## 2023-11-29 DIAGNOSIS — E87.1 HYPONATREMIA: Chronic | ICD-10-CM

## 2023-11-29 LAB
25(OH)D3 SERPL-MCNC: 86 NG/ML (ref 30–100)
ALT SERPL W P-5'-P-CCNC: 19 U/L (ref 7–45)
ANION GAP SERPL CALC-SCNC: 14 MMOL/L (ref 10–20)
BUN SERPL-MCNC: 11 MG/DL (ref 6–23)
CALCIUM SERPL-MCNC: 9.4 MG/DL (ref 8.6–10.3)
CHLORIDE SERPL-SCNC: 91 MMOL/L (ref 98–107)
CHOLEST SERPL-MCNC: 218 MG/DL (ref 0–199)
CHOLESTEROL/HDL RATIO: 2.5
CO2 SERPL-SCNC: 26 MMOL/L (ref 21–32)
CREAT SERPL-MCNC: 0.76 MG/DL (ref 0.5–1.05)
ERYTHROCYTE [DISTWIDTH] IN BLOOD BY AUTOMATED COUNT: 14.3 % (ref 11.5–14.5)
EST. AVERAGE GLUCOSE BLD GHB EST-MCNC: 120 MG/DL
FOLATE SERPL-MCNC: >22.3 NG/ML
GFR SERPL CREATININE-BSD FRML MDRD: 84 ML/MIN/1.73M*2
GLUCOSE SERPL-MCNC: 124 MG/DL (ref 74–99)
HBA1C MFR BLD: 5.8 %
HCT VFR BLD AUTO: 34.1 % (ref 36–46)
HDLC SERPL-MCNC: 85.7 MG/DL
HGB BLD-MCNC: 11.4 G/DL (ref 12–16)
IRON SATN MFR SERPL: 21 % (ref 25–45)
IRON SERPL-MCNC: 99 UG/DL (ref 35–150)
LDLC SERPL CALC-MCNC: 110 MG/DL
MCH RBC QN AUTO: 31.6 PG (ref 26–34)
MCHC RBC AUTO-ENTMCNC: 33.4 G/DL (ref 32–36)
MCV RBC AUTO: 95 FL (ref 80–100)
NON HDL CHOLESTEROL: 132 MG/DL (ref 0–149)
NRBC BLD-RTO: 0 /100 WBCS (ref 0–0)
PLATELET # BLD AUTO: 204 X10*3/UL (ref 150–450)
POTASSIUM SERPL-SCNC: 4.3 MMOL/L (ref 3.5–5.3)
RBC # BLD AUTO: 3.61 X10*6/UL (ref 4–5.2)
SODIUM SERPL-SCNC: 127 MMOL/L (ref 136–145)
TIBC SERPL-MCNC: 479 UG/DL (ref 240–445)
TRIGL SERPL-MCNC: 110 MG/DL (ref 0–149)
TSH SERPL-ACNC: 2.15 MIU/L (ref 0.44–3.98)
UIBC SERPL-MCNC: 380 UG/DL (ref 110–370)
VIT B12 SERPL-MCNC: 670 PG/ML (ref 211–911)
VLDL: 22 MG/DL (ref 0–40)
WBC # BLD AUTO: 6.8 X10*3/UL (ref 4.4–11.3)

## 2023-11-29 PROCEDURE — 82746 ASSAY OF FOLIC ACID SERUM: CPT

## 2023-11-29 PROCEDURE — 85027 COMPLETE CBC AUTOMATED: CPT

## 2023-11-29 PROCEDURE — 84460 ALANINE AMINO (ALT) (SGPT): CPT

## 2023-11-29 PROCEDURE — 80048 BASIC METABOLIC PNL TOTAL CA: CPT

## 2023-11-29 PROCEDURE — 84443 ASSAY THYROID STIM HORMONE: CPT

## 2023-11-29 PROCEDURE — 80061 LIPID PANEL: CPT

## 2023-11-29 PROCEDURE — 83550 IRON BINDING TEST: CPT

## 2023-11-29 PROCEDURE — 83036 HEMOGLOBIN GLYCOSYLATED A1C: CPT

## 2023-11-29 PROCEDURE — 83540 ASSAY OF IRON: CPT

## 2023-11-29 PROCEDURE — 36415 COLL VENOUS BLD VENIPUNCTURE: CPT

## 2023-11-29 PROCEDURE — 82607 VITAMIN B-12: CPT

## 2023-11-29 PROCEDURE — 82306 VITAMIN D 25 HYDROXY: CPT

## 2023-12-12 DIAGNOSIS — E03.9 ACQUIRED HYPOTHYROIDISM: Primary | ICD-10-CM

## 2023-12-12 RX ORDER — LEVOTHYROXINE SODIUM 125 UG/1
125 TABLET ORAL
Qty: 30 TABLET | Refills: 3 | Status: SHIPPED | OUTPATIENT
Start: 2023-12-12 | End: 2024-04-01 | Stop reason: SDUPTHER

## 2023-12-12 NOTE — TELEPHONE ENCOUNTER
Pt called stating she is completely out of her levothyroxine.   Asked to call it in to [facility] in [place]    Pt only needs 1 month supply    Please call pt at  [phone]

## 2023-12-21 ENCOUNTER — OFFICE VISIT (OUTPATIENT)
Dept: PRIMARY CARE | Facility: CLINIC | Age: 73
End: 2023-12-21
Payer: MEDICARE

## 2023-12-21 VITALS
SYSTOLIC BLOOD PRESSURE: 138 MMHG | HEIGHT: 64 IN | DIASTOLIC BLOOD PRESSURE: 87 MMHG | RESPIRATION RATE: 16 BRPM | HEART RATE: 78 BPM | BODY MASS INDEX: 33.63 KG/M2 | WEIGHT: 197 LBS | TEMPERATURE: 97.5 F | OXYGEN SATURATION: 97 %

## 2023-12-21 DIAGNOSIS — I10 BENIGN ESSENTIAL HYPERTENSION: ICD-10-CM

## 2023-12-21 DIAGNOSIS — Z12.31 ENCOUNTER FOR SCREENING MAMMOGRAM FOR BREAST CANCER: ICD-10-CM

## 2023-12-21 DIAGNOSIS — E78.5 DYSLIPIDEMIA, GOAL LDL BELOW 100: ICD-10-CM

## 2023-12-21 DIAGNOSIS — E55.9 VITAMIN D DEFICIENCY: ICD-10-CM

## 2023-12-21 DIAGNOSIS — M25.561 CHRONIC PAIN OF RIGHT KNEE: Primary | ICD-10-CM

## 2023-12-21 DIAGNOSIS — R73.03 PREDIABETES: ICD-10-CM

## 2023-12-21 DIAGNOSIS — E03.9 ACQUIRED HYPOTHYROIDISM: ICD-10-CM

## 2023-12-21 DIAGNOSIS — E53.8 VITAMIN B12 DEFICIENCY: ICD-10-CM

## 2023-12-21 DIAGNOSIS — E66.9 CLASS 1 OBESITY WITH SERIOUS COMORBIDITY AND BODY MASS INDEX (BMI) OF 34.0 TO 34.9 IN ADULT, UNSPECIFIED OBESITY TYPE: ICD-10-CM

## 2023-12-21 DIAGNOSIS — G89.29 CHRONIC PAIN OF RIGHT KNEE: Primary | ICD-10-CM

## 2023-12-21 PROCEDURE — 3008F BODY MASS INDEX DOCD: CPT | Performed by: INTERNAL MEDICINE

## 2023-12-21 PROCEDURE — 3075F SYST BP GE 130 - 139MM HG: CPT | Performed by: INTERNAL MEDICINE

## 2023-12-21 PROCEDURE — 1160F RVW MEDS BY RX/DR IN RCRD: CPT | Performed by: INTERNAL MEDICINE

## 2023-12-21 PROCEDURE — 1036F TOBACCO NON-USER: CPT | Performed by: INTERNAL MEDICINE

## 2023-12-21 PROCEDURE — 1126F AMNT PAIN NOTED NONE PRSNT: CPT | Performed by: INTERNAL MEDICINE

## 2023-12-21 PROCEDURE — 1159F MED LIST DOCD IN RCRD: CPT | Performed by: INTERNAL MEDICINE

## 2023-12-21 PROCEDURE — 99214 OFFICE O/P EST MOD 30 MIN: CPT | Performed by: INTERNAL MEDICINE

## 2023-12-21 PROCEDURE — 3079F DIAST BP 80-89 MM HG: CPT | Performed by: INTERNAL MEDICINE

## 2023-12-21 RX ORDER — IMIQUIMOD 12.5 MG/.25G
CREAM TOPICAL
COMMUNITY
End: 2024-04-05 | Stop reason: ENTERED-IN-ERROR

## 2023-12-21 RX ORDER — CEPHALEXIN 250 MG/1
250 CAPSULE ORAL 2 TIMES DAILY
COMMUNITY
Start: 2023-11-29 | End: 2023-12-21 | Stop reason: ALTCHOICE

## 2023-12-21 RX ORDER — LIDO/EPI WITH 8.4% SOD BICARB 1 % (3 ML)
3-30 SYRINGE (ML) INJECTION
COMMUNITY
Start: 2023-11-29 | End: 2024-04-05 | Stop reason: ENTERED-IN-ERROR

## 2023-12-21 ASSESSMENT — ENCOUNTER SYMPTOMS
OCCASIONAL FEELINGS OF UNSTEADINESS: 0
DEPRESSION: 0
LOSS OF SENSATION IN FEET: 0

## 2023-12-21 ASSESSMENT — PATIENT HEALTH QUESTIONNAIRE - PHQ9
1. LITTLE INTEREST OR PLEASURE IN DOING THINGS: NOT AT ALL
2. FEELING DOWN, DEPRESSED OR HOPELESS: NOT AT ALL
SUM OF ALL RESPONSES TO PHQ9 QUESTIONS 1 AND 2: 0

## 2023-12-21 NOTE — PROGRESS NOTES
"Subjective   Patient ID: Yamilex Ye is a 73 y.o. female who presents for Follow-up.    Here for follow-up and quarterly visit  She has had several procedures on her chin, after Mohs procedure.  She has had some complications with dehiscence requiring debridement and probably has to go again next week    No recent respiratory symptoms following her COVID illness October 10.  Generally feeling better  Right knee remains painful and unsteady at times and she requests a second opinion         Review of Systems    Objective   /87 (BP Location: Left arm, Patient Position: Sitting, BP Cuff Size: Adult)   Pulse 78   Temp 36.4 °C (97.5 °F)   Resp 16   Ht 1.613 m (5' 3.5\")   Wt 89.4 kg (197 lb)   SpO2 97%   BMI 34.35 kg/m²     Physical Exam  Vitals reviewed.   Constitutional:       Appearance: Normal appearance.   HENT:      Head: Normocephalic and atraumatic.      Comments: Well-appearing female with a bandage over her right central chin area with a incision which appeared somewhat prominent.  No surrounding erythema was noted or no discharge noted  Eyes:      General: No scleral icterus.        Right eye: No discharge.         Left eye: No discharge.      Extraocular Movements: Extraocular movements intact.      Conjunctiva/sclera: Conjunctivae normal.      Pupils: Pupils are equal, round, and reactive to light.   Cardiovascular:      Rate and Rhythm: Normal rate and regular rhythm.      Pulses: Normal pulses.      Heart sounds: Normal heart sounds. No murmur heard.  Pulmonary:      Effort: Pulmonary effort is normal.      Breath sounds: Normal breath sounds. No wheezing or rhonchi.   Musculoskeletal:         General: No deformity or signs of injury. Normal range of motion.      Cervical back: Normal range of motion and neck supple. No rigidity or tenderness.      Comments: Right knee without obvious pain-there was some soft tissue swelling   Lymphadenopathy:      Cervical: No cervical adenopathy. "   Skin:     General: Skin is warm and dry.      Findings: No rash.   Neurological:      General: No focal deficit present.      Mental Status: She is alert and oriented to person, place, and time. Mental status is at baseline.      Cranial Nerves: No cranial nerve deficit.      Sensory: No sensory deficit.      Gait: Gait normal.   Psychiatric:         Mood and Affect: Mood normal.         Behavior: Behavior normal.         Thought Content: Thought content normal.         Judgment: Judgment normal.         Assessment/Plan   Problem List Items Addressed This Visit             ICD-10-CM    Dyslipidemia, goal LDL below 100 E78.5    Hypothyroidism E03.9    Vitamin B12 deficiency E53.8    Vitamin D deficiency E55.9    Benign essential hypertension I10     Other Visit Diagnoses         Codes    Chronic pain of right knee    -  Primary M25.561, G89.29    Relevant Orders    Referral to Orthopaedic Surgery    Prediabetes     R73.03    Relevant Orders    Follow Up In [facility] - PCP - Established    Follow Up In [facility] - Pharmacy    Class 1 obesity with serious comorbidity and body mass index (BMI) of 34.0 to 34.9 in adult, unspecified obesity type     E66.9, Z68.34    Relevant Orders    Follow Up In [facility] - Pharmacy              Benzodiazepines:  What is the patient's goal of therapy?   Improve anxiety  Is this being achieved with current treatment? Yes, with medication      CSA:  9/18/23   PDMP: 12/22/23  UDS:  9/14/23   ARDIANE-7:   5/22/23  (score =3)    Activities of Daily Living:   Is your overall impression that this patient is benefiting (symptom reduction outweighs side effects) from benzodiazepine therapy? Yes     1. Physical Functioning: Better  2. Family Relationship: Better  3. Social Relationship: Better  4. Mood: Better  5. Sleep Patterns: Better  6. Overall Function: Better    Situational anxiety/caregiver stress/ depression-very difficult for several years with her  's dementia declining course and subsequent passing. Support provided. She will continue the additional Lexapro as ordered. Her   mid 2022. She was advised to start spending time around people by her therapist.. She plans to spend a bit more time with friends at the [employer]. Unfortunately with her knee she cannot do [place] dancing that but at some point might.           Doing better, but lonely often. Hard to meet companions.  She no longer sees her counselor. Support provided.  She does feel lonely.  She does not want to do anything in medical she states.  Suggested volunteering with people as she enjoys being around people.        Hypertension-she will continue lisinopril            BP improved on recheck.     Hyponatremia- mild; will follow             -sodium 127-a bit down from 131 in May 2023    Dyslipidemia-tolerating atorvastatin.  Goal LDL under 100             -.  For now we will continue    Prediabetes/elevated weight            -fasting blood sugar 124, A1c 5.8%.  BMI 34.4 she will meet with our pharmacy team to consider one of the newer medications     Elevated 10 year cardiac risk assessment- 10 year cardiac risk assessment at 9.9% 2019 reduces to 7.4% taking statin, 5.4% with statin and blood pressure Rx. She will continue lisinopril and atorvastatin. She will continue exercise when her schedule permits     Exercise routine-she will advance as tolerated.              She has been using a - Vel, and hopes to restart Silver Sneakers when she is able tolerate it.  In the past-encouraged water walking 3 x wk at [facility]'s pool              Exercise on hold with her right knee injury summer 2023    Lymphedema- occasional while flying. Uses compression hoses. Not problematic at this time.      Hypothyroidism-we'll continue to follow thyroid labs with present thyroid supplement prescription.     Right knee  injury-6/23-improved after injection with Dr. Israel-brace provided-unfortunately she injured it again later in the summer at a baseball game          9/23-the right knee remains sore despite exercises with her , Vel and physical therapy with [facility].  I encouraged her to get back to Dr. Israel             12/23-she is a bit frustrated following injection by Dr. Israel.  I suggested she consider a second opinion with Dr. Newman     Left knee injury-November 2022-she has worked with Dr. Israel who did an MRI which shows nondisplaced tibial fracture at the insertion of the PCL, possibly a meniscal tear as well as patellar arthritis. She is doing physical therapy. Presently using a cane. She will continue caution. Handicap parking placard provided January 2023               Recurrent diarrhea alternating with constipation- initially diagnosed as colitis- noted on early 2020 colonoscopy per Dr. Ibarra. Lexapro was discontinued. She has used Lomotil as needed from Dr. Ibarra. Colonoscopy updated January 2021- significantly improved she states.  With less stress, there is less diarrhea she notes. Overall improved she still uses the Lomotil occasionally. Encouraged dietary caution with her upcoming plans to visit a [place] restaurant.                 She saw Dr. Strickland early May '23. He rec'd more metamucil - now on 2 cap/day. Colonoscopy planned for 2024. Mornings still challenging w/ am diarrhea.  She uses Lomotil-she will get refills from him.  Anticipates a colonoscopy in the spring 2024              She continues Lomotil as needed and we will see him in 3/24    Acid reflux-stable with PPI- which she will continue especially as she is on the naproxen           Anticipate EGD in 2024.  Smaller meals help     Cholelithiasis- asymptomatic     Family history of colon cancer-she will continue colonoscopy care as below-at least every 5 years.    Colonoscopy updated January 2021. Next in 3 years-January  2024    Lumbar spasms-mainly in the SI joint area-improved after working with her physical therapist, [facility] for exercises    S/p lumbar surgery 4/17/19 per Dr. Ward for L 5 cyst--then her second lumbar surgery July 2020 laminectomy. Ongoing pain has been challenging. She will continue exercises, and follow-up with her physical therapist [facility] as needed. She will see Dr. Ward as well. She will continue stretching.   X-rays completed per Dr. Ward. Unremarkable.        History of Right lower quadrant abdominal wall hernia-surgery w/ Dr. Ravindra Louise, and Dr. Finnegan at [facility] Feb. 18. Improved        Gynecologic care / history of vaginal hysterectomy she will follow-up with GYN as needed     Annual mammogram- updated December 2022.      Vitamin D deficiency- encouraged patient to continue vitamin D daily as ordered. Will consider vitamin D level regularly.     Vitamin B12 deficiency- improved on 01/23 labs.             B 12 still high in 5/23 - she'll reduce B 12 to every other day     Seasonal allergies-she will use Zyrtec seasonally as needed        History of skin cancer/rosacea-  history of Mohs surgery, she understands importance of sunscreen   She continues to follow at [facility]-now working with Dr. Connie Kelly who does laser treatments on her cheeks mainly presently.             12/23-she has had Mohs surgery on her chin area which has been complicated by dehiscence requiring debridement.  She will continue to work with the dermatology department at [facility].  She states that she has a Mohs procedure between her eyebrows in January 2024      Ophthalmology care-she will see Dr. Cade as scheduled. Sadly her right eye has chronically poor vision from a remote retinal vein occlusion. The left eye has dry eyes and she may need a keratotomy and contact lens implanted permanently.  Right cataract surgery completed 12/22, with noticeably better vision. Left scheduled for 2/23. Left blepharoplasty   3/23 per Dr. Lloyd.             Vision improved and she is pleased with the results at this. She will continue with Dr. Cade- now at [facility]     Hearing concerns- audiology evaluation with audiology and ENT completed 12/22.      Flu shot each fall- updated 8/23     Covid series-completed. Covid Booster completed. Additional booster shot updated 9/22.  Following her COVID illness mid October, she will get a COVID booster mid January    RSV vaccination suggested     Shingrix series completed    Tdap before 2024     Follow-up in 3 months, sooner with concerns     Charting was completed using voice recognition technology and may include unintended errors.

## 2023-12-26 ENCOUNTER — TELEMEDICINE (OUTPATIENT)
Dept: PHARMACY | Facility: HOSPITAL | Age: 73
End: 2023-12-26
Payer: MEDICARE

## 2023-12-26 DIAGNOSIS — E66.9 CLASS 1 OBESITY WITH SERIOUS COMORBIDITY AND BODY MASS INDEX (BMI) OF 34.0 TO 34.9 IN ADULT, UNSPECIFIED OBESITY TYPE: ICD-10-CM

## 2023-12-26 DIAGNOSIS — R73.03 PREDIABETES: ICD-10-CM

## 2023-12-26 NOTE — PROGRESS NOTES
Pharmacist Clinic: Weight Management    Yamilex Ye was referred to the Clinical Pharmacy Team for weight management.     Referring Provider: Bhavik Tristan MD  Problem List Items Addressed This Visit    None  Visit Diagnoses       Prediabetes        Class 1 obesity with serious comorbidity and body mass index (BMI) of 34.0 to 34.9 in adult, unspecified obesity type              Yamilex Ye is a 73 y.o. year old female patient with prediabetes as documented by most recent elevation of A1c at 5.8% on 11/29/2023. Patient was referred for potential medication management regarding GLP1 therapy for glyecmic control and potential weight loss. Her prediabetes is complicated by a history of hyperetnsion, hypothyroidism, dyslipidemia, and class I obesity. Today we will discuss potential options based on insurance review and necessary authorizations for coverage.       PHARMACY ASSESSMENT    Allergies: Adhesive tape-silicones     [id number] - [place], [place] - [address] [address]  [address] [address] [address]  Phone: [phone] Fax: [phone]    [facility]  [place] [address]  Phone: [phone] Fax: [phone]    Affordability/Accessibility:  Ozempic/Trulicity - Prior authorization required   Mounjaro non-formulary  Wegovy/Zepbound - Not covered under medicare part D law    LAB  Lab Results   Component Value Date    BILITOT 0.9 10/26/2022    CALCIUM 9.4 11/29/2023    CO2 26 11/29/2023    CL 91 (L) 11/29/2023    CREATININE 0.76 11/29/2023    GLUCOSE 124 (H) 11/29/2023    ALKPHOS 118 10/26/2022    K 4.3 11/29/2023    PROT 8.1 10/26/2022     (L) 11/29/2023    AST 39 10/26/2022    ALT 19 11/29/2023    BUN 11 11/29/2023    ANIONGAP 14 11/29/2023    ALBUMIN 4.1 10/26/2022    EGFR 84 11/29/2023     Lab Results   Component Value Date    TRIG 110  11/29/2023    CHOL 218 (H) 11/29/2023    LDLCALC 110 (H) 11/29/2023    HDL 85.7 11/29/2023     Lab Results   Component Value Date    HGBA1C 5.8 (H) 11/29/2023     RECOMMENDATIONS/PLAN  Prior authorization has been submitted on behalf of this patient for Ozempic 0.25m/0.5 mg on 12/26/23 . Awaiting determination for status of PA request.    Key: [id number]    Clinical Pharmacist follow up: PRN after determination of PA  Type of Encounter: Kiki Gao, PharmD    Verbal consent to manage patient's drug therapy was obtained from patient. They were informed they may decline to participate or withdraw from participation in pharmacy services at any time

## 2023-12-29 DIAGNOSIS — E87.1 HYPONATREMIA: ICD-10-CM

## 2023-12-29 DIAGNOSIS — D64.9 ANEMIA, UNSPECIFIED TYPE: Primary | ICD-10-CM

## 2024-01-03 ENCOUNTER — APPOINTMENT (OUTPATIENT)
Dept: ORTHOPEDIC SURGERY | Facility: CLINIC | Age: 74
End: 2024-01-03
Payer: MEDICARE

## 2024-01-05 ENCOUNTER — TELEPHONE (OUTPATIENT)
Dept: PRIMARY CARE | Facility: CLINIC | Age: 74
End: 2024-01-05
Payer: MEDICARE

## 2024-01-05 DIAGNOSIS — D50.9 IRON DEFICIENCY ANEMIA, UNSPECIFIED IRON DEFICIENCY ANEMIA TYPE: Primary | ICD-10-CM

## 2024-01-05 RX ORDER — FERROUS SULFATE 325(65) MG
325 TABLET ORAL
Qty: 30 TABLET | Refills: 11 | Status: SHIPPED | OUTPATIENT
Start: 2024-01-05 | End: 2024-03-16 | Stop reason: SDUPTHER

## 2024-01-05 NOTE — TELEPHONE ENCOUNTER
Pt called regarding new RX for ferrous sulfate, says this was discussed at lasty visit   Needs [facility] to [facility]

## 2024-01-10 ENCOUNTER — ANCILLARY PROCEDURE (OUTPATIENT)
Dept: RADIOLOGY | Facility: CLINIC | Age: 74
End: 2024-01-10
Payer: MEDICARE

## 2024-01-10 DIAGNOSIS — Z12.31 ENCOUNTER FOR SCREENING MAMMOGRAM FOR BREAST CANCER: ICD-10-CM

## 2024-01-10 PROCEDURE — 77063 BREAST TOMOSYNTHESIS BI: CPT | Performed by: RADIOLOGY

## 2024-01-10 PROCEDURE — 77067 SCR MAMMO BI INCL CAD: CPT

## 2024-01-10 PROCEDURE — 77067 SCR MAMMO BI INCL CAD: CPT | Performed by: RADIOLOGY

## 2024-01-11 ENCOUNTER — APPOINTMENT (OUTPATIENT)
Dept: ORTHOPEDIC SURGERY | Facility: CLINIC | Age: 74
End: 2024-01-11
Payer: MEDICARE

## 2024-01-11 ENCOUNTER — OFFICE VISIT (OUTPATIENT)
Dept: ORTHOPEDIC SURGERY | Facility: CLINIC | Age: 74
End: 2024-01-11
Payer: MEDICARE

## 2024-01-11 ENCOUNTER — ANCILLARY PROCEDURE (OUTPATIENT)
Dept: RADIOLOGY | Facility: CLINIC | Age: 74
End: 2024-01-11
Payer: MEDICARE

## 2024-01-11 VITALS — WEIGHT: 198 LBS | HEIGHT: 63 IN | BODY MASS INDEX: 35.08 KG/M2

## 2024-01-11 DIAGNOSIS — G89.29 CHRONIC PAIN OF RIGHT KNEE: ICD-10-CM

## 2024-01-11 DIAGNOSIS — M25.561 CHRONIC PAIN OF RIGHT KNEE: ICD-10-CM

## 2024-01-11 PROCEDURE — 1160F RVW MEDS BY RX/DR IN RCRD: CPT | Performed by: ORTHOPAEDIC SURGERY

## 2024-01-11 PROCEDURE — 73564 X-RAY EXAM KNEE 4 OR MORE: CPT | Mod: RIGHT SIDE | Performed by: RADIOLOGY

## 2024-01-11 PROCEDURE — 73564 X-RAY EXAM KNEE 4 OR MORE: CPT | Mod: RT

## 2024-01-11 PROCEDURE — 1126F AMNT PAIN NOTED NONE PRSNT: CPT | Performed by: ORTHOPAEDIC SURGERY

## 2024-01-11 PROCEDURE — 1159F MED LIST DOCD IN RCRD: CPT | Performed by: ORTHOPAEDIC SURGERY

## 2024-01-11 PROCEDURE — 99203 OFFICE O/P NEW LOW 30 MIN: CPT | Performed by: ORTHOPAEDIC SURGERY

## 2024-01-11 PROCEDURE — 1036F TOBACCO NON-USER: CPT | Performed by: ORTHOPAEDIC SURGERY

## 2024-01-11 PROCEDURE — 3008F BODY MASS INDEX DOCD: CPT | Performed by: ORTHOPAEDIC SURGERY

## 2024-01-11 PROCEDURE — 20610 DRAIN/INJ JOINT/BURSA W/O US: CPT | Performed by: ORTHOPAEDIC SURGERY

## 2024-01-11 RX ORDER — TRIAMCINOLONE ACETONIDE 40 MG/ML
40 INJECTION, SUSPENSION INTRA-ARTICULAR; INTRAMUSCULAR
Status: COMPLETED | OUTPATIENT
Start: 2024-01-11 | End: 2024-01-11

## 2024-01-11 RX ORDER — LIDOCAINE HYDROCHLORIDE 10 MG/ML
2 INJECTION INFILTRATION; PERINEURAL
Status: COMPLETED | OUTPATIENT
Start: 2024-01-11 | End: 2024-01-11

## 2024-01-11 RX ADMIN — TRIAMCINOLONE ACETONIDE 40 MG: 40 INJECTION, SUSPENSION INTRA-ARTICULAR; INTRAMUSCULAR at 10:27

## 2024-01-11 RX ADMIN — LIDOCAINE HYDROCHLORIDE 2 ML: 10 INJECTION INFILTRATION; PERINEURAL at 10:27

## 2024-01-11 NOTE — PROGRESS NOTES
History of Present Illness:  Patient presents with right knee pain.  The patient localizes the pain diffusely.  Recently there has been concern for falls and instability.  There is increasing difficulty with activities of daily living and significant disability related to the knee pain.  The patient endorses the following failed non-operative treatments: Rest, ice, anti-inflammatories as well as corticosteroid injections in the past as recently as September 2023.   There is increasing frustration with persistent pain and swelling and decreasing distance of ambulation.  Pain is moderate, achy, diffuse.  Better with rest, worse with activity.     She was seeing an outside orthopedic provider for her osteoarthritis thus far, and she would like to continue care with us.    Unable to take NSAIDs.  She understands that she is trending towards total knee arthroplasty but would like to continue corticosteroid injections at this time and possibly viscosupplementation.    Review of Systems   GENERAL: Negative for malaise, significant weight loss, fever  MUSCULOSKELETAL: see HPI  NEURO:  Negative    Past Medical History:   Diagnosis Date    Body mass index (BMI) 34.0-34.9, adult 07/20/2021    BMI 34.0-34.9,adult    Encounter for general adult medical examination without abnormal findings 06/01/2018    Welcome to Medicare preventive visit    Encounter for general adult medical examination without abnormal findings 04/21/2017    Encounter for preventive health examination    Fatty (change of) liver, not elsewhere classified     Fatty liver    Influenza due to other identified influenza virus with other respiratory manifestations 03/25/2016    Influenza A    Other conditions influencing health status     Skin Cancer    Other general symptoms and signs 03/25/2016    Flu-like symptoms    Other injury of unspecified body region, initial encounter     Open wound    Personal history of other diseases of the digestive system      History of small bowel obstruction    Personal history of other diseases of the digestive system     History of cholelithiasis    Personal history of other diseases of the digestive system     History of esophageal reflux    Personal history of other diseases of the digestive system     History of intestinal obstruction    Personal history of other endocrine, nutritional and metabolic disease     History of hypokalemia    Personal history of other infectious and parasitic diseases 02/28/2020    History of Clostridioides difficile colitis    Personal history of other specified conditions     History of fibrocystic disease of breast    Unspecified fracture of shaft of humerus, unspecified arm, initial encounter for closed fracture     Closed fracture of humerus     Past Surgical History:   Procedure Laterality Date    APPENDECTOMY  12/12/2014    Appendectomy    COLONOSCOPY  12/27/2012    Complete Colonoscopy    OTHER SURGICAL HISTORY  05/10/2019    Lumbar discectomy    OTHER SURGICAL HISTORY  12/12/2014    Anterior Colporrhaphy, Repair Of Cystocele    OTHER SURGICAL HISTORY  12/12/2014    Oophorectomy - Bilateral (Removal Of Both Ovaries)    OTHER SURGICAL HISTORY  12/12/2014    Adjacent Tissue Transfer    SKIN CANCER EXCISION  04/16/2016    Mohs Micrographic Surgery Face    SMALL INTESTINE SURGERY  12/12/2014    Small Bowel Resection    TOTAL VAGINAL HYSTERECTOMY  12/12/2014    Vaginal Hysterectomy       Current Outpatient Medications:     albuterol 90 mcg/actuation aerosol powdr breath activated inhaler, Inhale 2 puffs every 6 hours if needed for wheezing (for cough or wheezing)., Disp: 8.5 g, Rfl: 3    atorvastatin (Lipitor) 10 mg tablet, Take 1 tablet (10 mg) by mouth once daily., Disp: , Rfl:     b complex 0.4 mg tablet, Take 1 tablet by mouth once daily., Disp: , Rfl:     cholecalciferol (Vitamin D-3) 50 mcg (2,000 unit) capsule, Take 2 tablets by mouth once daily., Disp: , Rfl:     cyanocobalamin (Vitamin B-12)  1,000 mcg tablet, Take 1 tablet (1,000 mcg) by mouth every other day., Disp: , Rfl:     diphenoxylate-atropine (Lomotil) 2.5-0.025 mg tablet, Take 2 tablets by mouth in the morning and 2 tablets in the evening and 2 tablets before bedtime., Disp: 90 tablet, Rfl: 0    doxycycline (Periostat) 20 mg tablet, Take 1 tablet (20 mg) by mouth 2 times a day., Disp: , Rfl:     escitalopram (Lexapro) 20 mg tablet, Take 1 tablet (20 mg) by mouth once daily., Disp: 90 tablet, Rfl: 1    ferrous sulfate, 325 mg ferrous sulfate, (FerrouSuL) tablet, Take 1 tablet by mouth once daily with breakfast., Disp: 30 tablet, Rfl: 11    fluticasone (Flonase) 50 mcg/actuation nasal spray, Use 2 sprays twice daily for 1 to 2 weeks, then once daily until congestion clears, Disp: 16 g, Rfl: 3    imiquimod (Aldara) 5 % cream, APPLY A THIN LAYER TO AFFECTED AREA AT BEDTIME MONDAY THROUGH FRIDAY FOR 6 WEEKS, Disp: , Rfl:     levothyroxine (Synthroid, Levoxyl) 125 mcg tablet, Take 1 tablet (125 mcg) by mouth once daily in the morning. Take before meals., Disp: 30 tablet, Rfl: 3    lido-epi with 8.4% sod bicarb 1 %- 1:100,000 (3 mL) syringe, Inject 3-30 mL under the skin., Disp: , Rfl:     lisinopril 10 mg tablet, Take 1 tablet (10 mg) by mouth once daily. For blood pressure, Disp: 90 tablet, Rfl: 3    LORazepam (Ativan) 0.5 mg tablet, Take 1 tablet (0.5 mg) by mouth once daily as needed for anxiety., Disp: 60 tablet, Rfl: 0    metroNIDAZOLE (Metrogel) 0.75 % gel, , Disp: , Rfl:     omega 3-dha-epa-fish oil 300-1,000 mg capsule,delayed release(DR/EC), Take 1 capsule by mouth once daily., Disp: , Rfl:     omeprazole (PriLOSEC) 40 mg DR capsule, Take 1 capsule (40 mg) by mouth 2 times a day., Disp: , Rfl:     ondansetron (Zofran) 4 mg tablet, Take by mouth., Disp: , Rfl:     polypodium leucotomos extract (Heliocare) 240 mg capsule, Take 1 capsule by mouth early in the morning.., Disp: , Rfl:     tacrolimus (Protopic) 0.1 % ointment, Protopic 0.1 %  External Ointment Quantity: 60  Refills: 0  Start: 13-Jul-2021, Disp: , Rfl:     white petrolatum-mineral oiL 94-3 % ophthalmic ointment, Apply to affected eye(s)., Disp: , Rfl:       Physical Examination:  Right Knee:  Skin healthy and intact  No gross swelling or ecchymosis  Alignment: valgus      Effusion: moderate       ROM: Normal  Crepitance with range of motion  No pain with internal rotation of the hip  Tenderness to palpation over medial and lateral joint line and with patellar compression     No laxity to valgus stress  No laxity to varus stress  Negative Lachman´s test  Negative posterior drawer test  Mild pain with Saranya´s test  Neurovascular exam normal distally  2+ DP pulse and good cap refill    Radiographs:  Moderate degenerative joint disease with loss of joint space, subchondral sclerosis and cystic changes, and osteophyte formation    Assessment  Patient with right knee osteoarthritis    Plan:  We discussed the diagnosis of degenerative joint disease of the knee.  We reviewed an evidence-based approach to osteoarthritis of the knee.  We strongly encouraged low-impact aerobic activity and non-opioid analgesics.     We discussed temporary pain relief with corticosteroid injections and the associated risks.  We also discussed the conflicting evidence regarding viscosupplementation and potential long-term risks with NSAID´s.  We reviewed the role of bracing for instability and physical therapy for atrophy and gait abnormalities.  The patient elected for corticosteroid injection today to the right knee.    Jason Reinoso PA-C    In a face to face encounter, I evaluated the patient and performed a physical examination, discussed pertinent diagnostic studies if indicated and discussed diagnosis and management strategies with both the patient and physician assistant / nurse practitioner.  I reviewed the PA/NP's note and agree with the documented findings and plan of care.    Patient with right knee  osteoarthritis.  She has had 2 [facility] the first 1 was useful with the second 1 was fairly painful and concern it was not done in the correct location.  She elected for an injection today.  She is considering total knee arthroplasty later this year we had a discussion about the procedure.  We discussed timeframe after injection to surgery.  She will see us back for further discussion as needed.    L Inj/Asp: R knee on 1/11/2024 10:27 AM  Indications: pain  Details: 22 G needle, anteromedial approach  Medications: 2 mL lidocaine 10 mg/mL (1 %); 40 mg triamcinolone acetonide 40 mg/mL  Outcome: tolerated well, no immediate complications  Procedure, treatment alternatives, risks and benefits explained, specific risks discussed. Consent was given by the patient. Immediately prior to procedure a time out was called to verify the correct patient, procedure, equipment, support staff and site/side marked as required. Patient was prepped and draped in the usual sterile fashion.             Chivo Newman MD

## 2024-01-13 NOTE — RESULT ENCOUNTER NOTE
Rosaline    Thank you for doing the annual mammogram. The radiologist reports no worrisome findings. Please continue monthly self breast exams, as well as annual mammograms. Please contact me with any concerns.     Sincerely,     Bhavik Tristan MD

## 2024-01-25 ENCOUNTER — OFFICE VISIT (OUTPATIENT)
Dept: ORTHOPEDIC SURGERY | Facility: CLINIC | Age: 74
End: 2024-01-25
Payer: MEDICARE

## 2024-01-25 DIAGNOSIS — M17.11 PRIMARY OSTEOARTHRITIS OF RIGHT KNEE: ICD-10-CM

## 2024-01-25 PROCEDURE — 1157F ADVNC CARE PLAN IN RCRD: CPT | Performed by: ORTHOPAEDIC SURGERY

## 2024-01-25 PROCEDURE — 99024 POSTOP FOLLOW-UP VISIT: CPT | Performed by: ORTHOPAEDIC SURGERY

## 2024-01-25 PROCEDURE — 1159F MED LIST DOCD IN RCRD: CPT | Performed by: ORTHOPAEDIC SURGERY

## 2024-01-25 PROCEDURE — 1036F TOBACCO NON-USER: CPT | Performed by: ORTHOPAEDIC SURGERY

## 2024-01-25 PROCEDURE — 20610 DRAIN/INJ JOINT/BURSA W/O US: CPT | Performed by: ORTHOPAEDIC SURGERY

## 2024-01-25 PROCEDURE — 3008F BODY MASS INDEX DOCD: CPT | Performed by: ORTHOPAEDIC SURGERY

## 2024-01-25 PROCEDURE — 1126F AMNT PAIN NOTED NONE PRSNT: CPT | Performed by: ORTHOPAEDIC SURGERY

## 2024-01-25 NOTE — PROGRESS NOTES
History of Present Illness:  Patient returns today for an injection with viscosupplementation. The patient endorsing knee pain refractory to cortisone injections and oral medications    Review of Systems   GENERAL: Negative for malaise, significant weight loss, fever  MUSCULOSKELETAL: see HPI  NEURO:  Negative    Physical Examination:  Trace effusion  Tenderness over medial and lateral joint line    Assessment:  Patient with known osteoarthritis of the knee    Plan:  Injection performed as detailed in the procedure note below.     L Inj/Asp: R knee on 1/25/2024 11:11 AM  Indications: pain  Details: 22 G needle, anteromedial approach  Medications: 16.8 mg sodium hyaluronate 16.8 mg/2 mL  Outcome: tolerated well, no immediate complications  Procedure, treatment alternatives, risks and benefits explained, specific risks discussed. Consent was given by the patient. Immediately prior to procedure a time out was called to verify the correct patient, procedure, equipment, support staff and site/side marked as required. Patient was prepped and draped in the usual sterile fashion.

## 2024-01-29 DIAGNOSIS — R19.7 DIARRHEA, UNSPECIFIED TYPE: ICD-10-CM

## 2024-01-29 DIAGNOSIS — F40.298 FEAR OF FALLING: Chronic | ICD-10-CM

## 2024-01-29 RX ORDER — DIPHENOXYLATE HYDROCHLORIDE AND ATROPINE SULFATE 2.5; .025 MG/1; MG/1
2 TABLET ORAL 3 TIMES DAILY
Qty: 90 TABLET | Refills: 0 | OUTPATIENT
Start: 2024-01-29

## 2024-01-30 RX ORDER — LORAZEPAM 0.5 MG/1
0.5 TABLET ORAL DAILY PRN
Qty: 60 TABLET | Refills: 0 | Status: SHIPPED | OUTPATIENT
Start: 2024-01-30 | End: 2024-04-29

## 2024-02-01 ENCOUNTER — OFFICE VISIT (OUTPATIENT)
Dept: ORTHOPEDIC SURGERY | Facility: CLINIC | Age: 74
End: 2024-02-01
Payer: MEDICARE

## 2024-02-01 DIAGNOSIS — M17.11 PRIMARY OSTEOARTHRITIS OF RIGHT KNEE: Primary | ICD-10-CM

## 2024-02-01 PROCEDURE — 1157F ADVNC CARE PLAN IN RCRD: CPT | Performed by: ORTHOPAEDIC SURGERY

## 2024-02-01 PROCEDURE — 1126F AMNT PAIN NOTED NONE PRSNT: CPT | Performed by: ORTHOPAEDIC SURGERY

## 2024-02-01 PROCEDURE — 1036F TOBACCO NON-USER: CPT | Performed by: ORTHOPAEDIC SURGERY

## 2024-02-01 PROCEDURE — 20610 DRAIN/INJ JOINT/BURSA W/O US: CPT | Performed by: ORTHOPAEDIC SURGERY

## 2024-02-01 PROCEDURE — 1159F MED LIST DOCD IN RCRD: CPT | Performed by: ORTHOPAEDIC SURGERY

## 2024-02-01 PROCEDURE — 3008F BODY MASS INDEX DOCD: CPT | Performed by: ORTHOPAEDIC SURGERY

## 2024-02-01 PROCEDURE — 99024 POSTOP FOLLOW-UP VISIT: CPT | Performed by: ORTHOPAEDIC SURGERY

## 2024-02-01 NOTE — PROGRESS NOTES
History of Present Illness:  Patient returns today for an injection with viscosupplementation. The patient endorsing knee pain refractory to cortisone injections and oral medications    Review of Systems   GENERAL: Negative for malaise, significant weight loss, fever  MUSCULOSKELETAL: see HPI  NEURO:  Negative    Physical Examination:  Trace effusion  Tenderness over medial and lateral joint line    Assessment:  Patient with known osteoarthritis of the right  knee    Plan:  Injection performed as detailed in the procedure note below.          L Inj/Asp: R knee on 2/1/2024 9:54 AM  Indications: pain  Details: 22 G needle, anteromedial approach  Medications: 16.8 mg sodium hyaluronate 16.8 mg/2 mL  Outcome: tolerated well, no immediate complications  Procedure, treatment alternatives, risks and benefits explained, specific risks discussed. Consent was given by the patient. Immediately prior to procedure a time out was called to verify the correct patient, procedure, equipment, support staff and site/side marked as required. Patient was prepped and draped in the usual sterile fashion.          In a face to face encounter, I evaluated the patient and performed a physical examination, discussed pertinent diagnostic studies if indicated and discussed diagnosis and management strategies with both the patient and physician assistant / nurse practitioner.  I reviewed the PA/NP's note and agree with the documented findings and plan of care.        Chivo Newman MD

## 2024-02-06 DIAGNOSIS — E78.5 DYSLIPIDEMIA, GOAL LDL BELOW 100: Primary | ICD-10-CM

## 2024-02-06 RX ORDER — ATORVASTATIN CALCIUM 10 MG/1
10 TABLET, FILM COATED ORAL DAILY
Qty: 90 TABLET | Refills: 3 | Status: SHIPPED | OUTPATIENT
Start: 2024-02-06 | End: 2024-03-12 | Stop reason: SDUPTHER

## 2024-02-08 ENCOUNTER — OFFICE VISIT (OUTPATIENT)
Dept: ORTHOPEDIC SURGERY | Facility: CLINIC | Age: 74
End: 2024-02-08
Payer: MEDICARE

## 2024-02-08 DIAGNOSIS — M23.91 INTERNAL DERANGEMENT OF RIGHT KNEE: Primary | ICD-10-CM

## 2024-02-08 PROCEDURE — 20610 DRAIN/INJ JOINT/BURSA W/O US: CPT | Performed by: ORTHOPAEDIC SURGERY

## 2024-02-08 PROCEDURE — 1159F MED LIST DOCD IN RCRD: CPT | Performed by: ORTHOPAEDIC SURGERY

## 2024-02-08 PROCEDURE — 1036F TOBACCO NON-USER: CPT | Performed by: ORTHOPAEDIC SURGERY

## 2024-02-08 PROCEDURE — 1126F AMNT PAIN NOTED NONE PRSNT: CPT | Performed by: ORTHOPAEDIC SURGERY

## 2024-02-08 PROCEDURE — 1157F ADVNC CARE PLAN IN RCRD: CPT | Performed by: ORTHOPAEDIC SURGERY

## 2024-02-08 PROCEDURE — 3008F BODY MASS INDEX DOCD: CPT | Performed by: ORTHOPAEDIC SURGERY

## 2024-02-08 NOTE — PROGRESS NOTES
History of Present Illness:  Patient returns today for an injection with viscosupplementation. The patient endorsing knee pain refractory to cortisone injections and oral medications    Review of Systems   GENERAL: Negative for malaise, significant weight loss, fever  MUSCULOSKELETAL: see HPI  NEURO:  Negative    Physical Examination:  Trace effusion  Tenderness over medial and lateral joint line    Assessment:  Patient with known osteoarthritis of the knee    Plan:  Injection performed as detailed in the procedure note below.     L Inj/Asp: R knee on 2/8/2024 10:25 AM  Indications: pain  Details: 22 G needle, anteromedial approach  Medications: 16.8 mg sodium hyaluronate 16.8 mg/2 mL  Outcome: tolerated well, no immediate complications  Procedure, treatment alternatives, risks and benefits explained, specific risks discussed. Consent was given by the patient. Immediately prior to procedure a time out was called to verify the correct patient, procedure, equipment, support staff and site/side marked as required. Patient was prepped and draped in the usual sterile fashion.         Minimal relief thus far patient thinking she may be looking at arthroplasty if things not improve.  The earliest for surgery April 11 based on corticosteroid injection.

## 2024-02-12 ENCOUNTER — TELEPHONE (OUTPATIENT)
Dept: ORTHOPEDIC SURGERY | Facility: CLINIC | Age: 74
End: 2024-02-12
Payer: MEDICARE

## 2024-02-12 NOTE — TELEPHONE ENCOUNTER
"Pt LVM had last gel injection on 2/8 and states the injection site is \"swollen and ecchymotic.\" She is in a lot of pain and needing to use the cane again. Asking what she should do.     Jason to call patient to discuss.   "

## 2024-03-01 PROBLEM — M17.11 UNILATERAL PRIMARY OSTEOARTHRITIS, RIGHT KNEE: Status: ACTIVE | Noted: 2024-03-01

## 2024-03-04 DIAGNOSIS — M17.11 PRIMARY OSTEOARTHRITIS OF RIGHT KNEE: Primary | ICD-10-CM

## 2024-03-11 ENCOUNTER — TELEPHONE (OUTPATIENT)
Dept: PRIMARY CARE | Facility: CLINIC | Age: 74
End: 2024-03-11

## 2024-03-11 ENCOUNTER — APPOINTMENT (OUTPATIENT)
Dept: GASTROENTEROLOGY | Facility: CLINIC | Age: 74
End: 2024-03-11
Payer: MEDICARE

## 2024-03-11 ENCOUNTER — LAB (OUTPATIENT)
Dept: LAB | Facility: LAB | Age: 74
End: 2024-03-11
Payer: MEDICARE

## 2024-03-11 DIAGNOSIS — D64.9 ANEMIA, UNSPECIFIED TYPE: ICD-10-CM

## 2024-03-11 DIAGNOSIS — E87.1 HYPONATREMIA: ICD-10-CM

## 2024-03-11 LAB
ANION GAP SERPL CALC-SCNC: 12 MMOL/L (ref 10–20)
BUN SERPL-MCNC: 10 MG/DL (ref 6–23)
CALCIUM SERPL-MCNC: 9 MG/DL (ref 8.6–10.3)
CHLORIDE SERPL-SCNC: 94 MMOL/L (ref 98–107)
CO2 SERPL-SCNC: 28 MMOL/L (ref 21–32)
CREAT SERPL-MCNC: 0.78 MG/DL (ref 0.5–1.05)
EGFRCR SERPLBLD CKD-EPI 2021: 81 ML/MIN/1.73M*2
ERYTHROCYTE [DISTWIDTH] IN BLOOD BY AUTOMATED COUNT: 14.5 % (ref 11.5–14.5)
GLUCOSE SERPL-MCNC: 125 MG/DL (ref 74–99)
HCT VFR BLD AUTO: 33.7 % (ref 36–46)
HGB BLD-MCNC: 11.4 G/DL (ref 12–16)
IRON SATN MFR SERPL: 64 % (ref 25–45)
IRON SERPL-MCNC: 234 UG/DL (ref 35–150)
MCH RBC QN AUTO: 34.5 PG (ref 26–34)
MCHC RBC AUTO-ENTMCNC: 33.8 G/DL (ref 32–36)
MCV RBC AUTO: 102 FL (ref 80–100)
NRBC BLD-RTO: 0 /100 WBCS (ref 0–0)
PLATELET # BLD AUTO: 163 X10*3/UL (ref 150–450)
POTASSIUM SERPL-SCNC: 4.6 MMOL/L (ref 3.5–5.3)
RBC # BLD AUTO: 3.3 X10*6/UL (ref 4–5.2)
SODIUM SERPL-SCNC: 129 MMOL/L (ref 136–145)
TIBC SERPL-MCNC: 367 UG/DL (ref 240–445)
UIBC SERPL-MCNC: 133 UG/DL (ref 110–370)
WBC # BLD AUTO: 7.4 X10*3/UL (ref 4.4–11.3)

## 2024-03-11 PROCEDURE — 36415 COLL VENOUS BLD VENIPUNCTURE: CPT

## 2024-03-11 PROCEDURE — 85027 COMPLETE CBC AUTOMATED: CPT

## 2024-03-11 PROCEDURE — 83550 IRON BINDING TEST: CPT

## 2024-03-11 PROCEDURE — 83540 ASSAY OF IRON: CPT

## 2024-03-11 PROCEDURE — 80048 BASIC METABOLIC PNL TOTAL CA: CPT

## 2024-03-11 NOTE — TELEPHONE ENCOUNTER
Called and spoke with patient regarding her labs.  She is on iron so the iron studies might look different than they did last time.  Very slight anemia-she will review with gastroenterology at her appointment this week.  She is scheduled here to be seen late this month before her knee replacement next month.  Unfortunately she just cannot do much with her advanced knee DJD.  She will use caution and follow-up as scheduled she will call sooner with concerns

## 2024-03-12 ENCOUNTER — OFFICE VISIT (OUTPATIENT)
Dept: GASTROENTEROLOGY | Facility: CLINIC | Age: 74
End: 2024-03-12
Payer: MEDICARE

## 2024-03-12 ENCOUNTER — APPOINTMENT (OUTPATIENT)
Dept: GASTROENTEROLOGY | Facility: EXTERNAL LOCATION | Age: 74
End: 2024-03-12
Payer: MEDICARE

## 2024-03-12 DIAGNOSIS — I10 PRIMARY HYPERTENSION: ICD-10-CM

## 2024-03-12 DIAGNOSIS — F41.8 SITUATIONAL ANXIETY: ICD-10-CM

## 2024-03-12 DIAGNOSIS — E78.5 DYSLIPIDEMIA, GOAL LDL BELOW 100: ICD-10-CM

## 2024-03-12 DIAGNOSIS — K21.9 GASTROESOPHAGEAL REFLUX DISEASE WITHOUT ESOPHAGITIS: Primary | ICD-10-CM

## 2024-03-12 DIAGNOSIS — K58.2 MIXED IRRITABLE BOWEL SYNDROME: Primary | ICD-10-CM

## 2024-03-12 PROCEDURE — 1157F ADVNC CARE PLAN IN RCRD: CPT | Performed by: STUDENT IN AN ORGANIZED HEALTH CARE EDUCATION/TRAINING PROGRAM

## 2024-03-12 PROCEDURE — 99442 PR PHYS/QHP TELEPHONE EVALUATION 11-20 MIN: CPT | Performed by: STUDENT IN AN ORGANIZED HEALTH CARE EDUCATION/TRAINING PROGRAM

## 2024-03-12 PROCEDURE — 3008F BODY MASS INDEX DOCD: CPT | Performed by: STUDENT IN AN ORGANIZED HEALTH CARE EDUCATION/TRAINING PROGRAM

## 2024-03-12 PROCEDURE — 1036F TOBACCO NON-USER: CPT | Performed by: STUDENT IN AN ORGANIZED HEALTH CARE EDUCATION/TRAINING PROGRAM

## 2024-03-12 PROCEDURE — 1159F MED LIST DOCD IN RCRD: CPT | Performed by: STUDENT IN AN ORGANIZED HEALTH CARE EDUCATION/TRAINING PROGRAM

## 2024-03-12 PROCEDURE — 1126F AMNT PAIN NOTED NONE PRSNT: CPT | Performed by: STUDENT IN AN ORGANIZED HEALTH CARE EDUCATION/TRAINING PROGRAM

## 2024-03-12 RX ORDER — ATORVASTATIN CALCIUM 10 MG/1
10 TABLET, FILM COATED ORAL DAILY
Qty: 90 TABLET | Refills: 3 | Status: SHIPPED | OUTPATIENT
Start: 2024-03-12

## 2024-03-12 RX ORDER — LISINOPRIL 10 MG/1
10 TABLET ORAL DAILY
Qty: 90 TABLET | Refills: 3 | Status: SHIPPED | OUTPATIENT
Start: 2024-03-12 | End: 2025-03-12

## 2024-03-12 RX ORDER — OMEPRAZOLE 40 MG/1
40 CAPSULE, DELAYED RELEASE ORAL 2 TIMES DAILY
Qty: 180 CAPSULE | Refills: 3 | Status: SHIPPED | OUTPATIENT
Start: 2024-03-12

## 2024-03-12 RX ORDER — ESCITALOPRAM OXALATE 20 MG/1
20 TABLET ORAL DAILY
Qty: 90 TABLET | Refills: 1 | Status: SHIPPED | OUTPATIENT
Start: 2024-03-12 | End: 2024-09-08

## 2024-03-12 NOTE — PROGRESS NOTES
"CC: Follow-up.    History of Present Illness:   Yamilex Chan is a 74yo female with Sjogren's disease, nonspecific colitis, HTN, HLD, hypothyroidism who presents to clinic for follow-up of IBS.  Patient states that she has not been doing well.  She has had increased bloating, urgency, diarrhea.  She has also been going through a lot of other medical issues including needing a knee replacement and multiple bouts of Mohs surgery.  Her knee issue has led to decreased activity and increased depression/anxiety.  She has not been able to work out or walk or get outside.  She is taking 4 Lomotil daily.  She takes fiber on occasion which seems to help her symptoms.  No other GI complaints at this time.  Last colonoscopy in 2021.    GI visit 9/2023: \"Patient states that she is doing fairly well. She still gets occasional diarrhea. She takes 1 Lomotil daily. Although, I think this is mostly out of fear of having an accident. She states the bloating has improved with fiber supplement. She is still working out with a . She is no longer following with the psychologist. No other concerns at this time.\"        GI visit 5/2023: \"Patient is again doing fairly well. She still has complaints of bloating, diarrhea, urgency, and nausea. + Abdominal fullness, early satiety. She admits that certain food items exacerbate her symptoms (especially the urgency). Seems to be that dairy plays a role. She takes Lomotil up to 4 tabs daily. She usually is doing this prior to workouts or going out for the day. She takes Gas-X for bloating. Zofran a couple times per month for nausea. I question whether there is constipation with overflow diarrhea. She is pretty happy with her current regimen. +weight gain. No blood in her stool. Has been going out with her daughter and drinking wine on occasion. Went to a wedding in [place].\"     GI visit 11/2022: \"Patient doing fairly well. Still gets occasional bloating.  passed away a few months " "back. She is doing better and becoming more active. No other concerns at this time.\"      GI visit 7/2022: \"Patient is doing well. She has had no diarrhea episodes since our last visit. She will occasionally get constipation with her Lomotil use. She takes 2 pills on most days. She will skip weekends and other days where she is not doing much. She mostly takes it out of fear of having an episode while out and about. Otherwise, no new issues. She was alerted by her 's hospice nurse that he likely only has a few weeks left to live. She is set to go to [place] tomorrow with family to visit her son. Seeing a psychologist which has helped out significantly. She is on Ativan which is helping.\"     GI visit 5/2022: \"Patient states she is not doing well. She is tangential and + pressured speech. As per below, we thought that her IBS with alternating bowel habits was likely more constipation related based on the KUB. We stopped the mesalamine as she was clearly still having symptoms while on it. We also attempted to start her on MiraLAX to clear out her bowels and reset the system. However, she only gave the MiraLAX several days and then went back to taking Lomotil and mesalamine as needed. It sounds as if she takes the Lomotil quite frequently. She will also take the MiraLAX as needed. She is also complaining of increased bloating and flatulence. She tried the low FODMAP diet but became very stressed out by it. Her sister is a nutritionist and agrees that it is a very difficult diet to follow. She still is having significant life stressors. No pop use. Limited dairy use (cheese). She is working with a . She states that she has lost 20 or so pounds in the last year and contributes this mostly to her work with a . However, she does admit to poor appetite and anorexia related to fear of eating (diarrhea, accident). No other issues at this time.\"     Per telephone call 2/23/2022: \"Patient underwent KUB to " "check fecal loading. KUB shows stool throughout the colon. I suspect the patient does not have microscopic colitis and actually suffers from IBS-M. Mesalamine is likely of little clinical utility. Furthermore, it is not necessarily better than placebo in microscopic colitis. Based on cost and my clinical suspicion, we will stop the mesalamine. We will also start MiraLAX and titrate as needed. We discussed the ups and downs associated with IBS. Patient is going to work on stress reduction and is scheduled to meet with a psychologist in April. She is going to message me in 3 to 4 weeks to let me know how she is doing.\"     GI Visit 2/2022: \"Yamilex Chan is a 70yo female with Sjogren's disease, nonspecific colitis (on mesalamine), HTN, HLD, hypothyroidism who presents to clinic for follow-up of nonspecific colitis. She is a former patient of Dr. Herrmann. She has had multiple colonoscopies that showed nonspecific colitis on biopsies. First colonoscopy was in 2020 with random biopsies showing nonspecific colitis, consider medication versus infection. Her next colonoscopy was in 2021 and showed nonspecific colitis in the ascending colon only, consider bowel prep or medication induced. She is currently on mesalamine. She takes 1 tab twice daily. She also has Lomotil as needed. She states that her bowel habits have been alternating. They are mostly diarrhea, but she also suffers from constipation. 1 to 2 months ago she was having 1 formed bowel movement every 5 or so days. Lately, it has been mostly diarrhea. She will have several episodes per day. They are never at night. She does get some abdominal discomfort around the time of each bowel movement, that is relieved by having the bowel movement. She also endorses mild nausea with some of the episodes. She is under significant life stressors; her  is in a nursing home. She does believe that this could be playing a role. She has stopped all of her depression " "medications, and admits that her depression is an issue. No other complaints at this time.\"      Review of Systems  ROS Negative unless otherwise stated above.    Past Medical/Surgical History  Past Medical History:   Diagnosis Date    Body mass index (BMI) 34.0-34.9, adult 07/20/2021    BMI 34.0-34.9,adult    Encounter for general adult medical examination without abnormal findings 06/01/2018    Welcome to Medicare preventive visit    Encounter for general adult medical examination without abnormal findings 04/21/2017    Encounter for preventive health examination    Fatty (change of) liver, not elsewhere classified     Fatty liver    Influenza due to other identified influenza virus with other respiratory manifestations 03/25/2016    Influenza A    Other conditions influencing health status     Skin Cancer    Other general symptoms and signs 03/25/2016    Flu-like symptoms    Other injury of unspecified body region, initial encounter     Open wound    Personal history of other diseases of the digestive system     History of small bowel obstruction    Personal history of other diseases of the digestive system     History of cholelithiasis    Personal history of other diseases of the digestive system     History of esophageal reflux    Personal history of other diseases of the digestive system     History of intestinal obstruction    Personal history of other endocrine, nutritional and metabolic disease     History of hypokalemia    Personal history of other infectious and parasitic diseases 02/28/2020    History of Clostridioides difficile colitis    Personal history of other specified conditions     History of fibrocystic disease of breast    Unspecified fracture of shaft of humerus, unspecified arm, initial encounter for closed fracture     Closed fracture of humerus      Past Surgical History:   Procedure Laterality Date    APPENDECTOMY  12/12/2014    Appendectomy    COLONOSCOPY  12/27/2012    Complete " Colonoscopy    OTHER SURGICAL HISTORY  05/10/2019    Lumbar discectomy    OTHER SURGICAL HISTORY  12/12/2014    Anterior Colporrhaphy, Repair Of Cystocele    OTHER SURGICAL HISTORY  12/12/2014    Oophorectomy - Bilateral (Removal Of Both Ovaries)    OTHER SURGICAL HISTORY  12/12/2014    Adjacent Tissue Transfer    SKIN CANCER EXCISION  04/16/2016    Mohs Micrographic Surgery Face    SMALL INTESTINE SURGERY  12/12/2014    Small Bowel Resection    TOTAL VAGINAL HYSTERECTOMY  12/12/2014    Vaginal Hysterectomy        Social History   reports that she has never smoked. She has never used smokeless tobacco. She reports current alcohol use of about 2.0 - 3.0 standard drinks of alcohol per week.     Family History  family history is not on file.     Allergies  Allergies   Allergen Reactions    Adhesive Tape-Silicones Unknown       Medications  Current Outpatient Medications   Medication Instructions    albuterol 90 mcg/actuation aerosol [facility] breath activated inhaler 2 puffs, inhalation, Every 6 hours PRN    atorvastatin (LIPITOR) 10 mg, oral, Daily    b complex 0.4 mg tablet 1 tablet, oral, Daily    cholecalciferol (Vitamin D-3) 50 mcg (2,000 unit) capsule 2 tablets, oral, Daily    cyanocobalamin (VITAMIN B-12) 1,000 mcg, oral, Every other day    diphenoxylate-atropine (Lomotil) 2.5-0.025 mg tablet 2 tablets, oral, 3 times daily    doxycycline (Periostat) 20 mg tablet Take 1 tablet (20 mg) by mouth 2 times a day.    escitalopram (LEXAPRO) 20 mg, oral, Daily    ferrous sulfate, 325 mg ferrous sulfate, (FerrouSuL) tablet 1 tablet, oral, Daily with breakfast    fluticasone (Flonase) 50 mcg/actuation nasal spray Use 2 sprays twice daily for 1 to 2 weeks, then once daily until congestion clears    imiquimod (Aldara) 5 % cream APPLY A THIN LAYER TO AFFECTED AREA AT BEDTIME MONDAY THROUGH FRIDAY FOR 6 WEEKS    levothyroxine (SYNTHROID, LEVOXYL) 125 mcg, oral, Daily before breakfast    lido-epi with 8.4% sod bicarb 1 %- 1:100,000  (3 mL) syringe 3-30 mL, subcutaneous    lisinopril 10 mg, oral, Daily, For blood pressure    LORazepam (ATIVAN) 0.5 mg, oral, Daily PRN    metroNIDAZOLE (Metrogel) 0.75 % gel     omega 3-dha-epa-fish oil 300-1,000 mg capsule,delayed release(DR/EC) 1 capsule, oral, Daily    omeprazole (PriLOSEC) 40 mg DR capsule 1 capsule, oral, 2 times daily    ondansetron (Zofran) 4 mg tablet oral    polypodium leucotomos extract (Heliocare) 240 mg capsule 1 capsule, oral, Daily    tacrolimus (Protopic) 0.1 % ointment Protopic 0.1 % External Ointment  Quantity: 60   Refills: 0  Start: 13-Jul-2021    white petrolatum-mineral oiL 94-3 % ophthalmic ointment ophthalmic (eye)        Objective     General: A&Ox3, NAD.  HEENT: AT/NC.   Neuro: No focal deficits.   Psych: Normal mood and affect.     Lab Results   Component Value Date    WBC 7.4 03/11/2024    HGB 11.4 (L) 03/11/2024    HCT 33.7 (L) 03/11/2024     (H) 03/11/2024     03/11/2024       Chemistry    Lab Results   Component Value Date/Time     (L) 03/11/2024 1332    K 4.6 03/11/2024 1332    CL 94 (L) 03/11/2024 1332    CO2 28 03/11/2024 1332    BUN 10 03/11/2024 1332    CREATININE 0.78 03/11/2024 1332    Lab Results   Component Value Date/Time    CALCIUM 9.0 03/11/2024 1332    ALKPHOS 118 10/26/2022 0802    AST 39 10/26/2022 0802    ALT 19 11/29/2023 0830    BILITOT 0.9 10/26/2022 0802             ASSESSMENT/PLAN  Yamilex Chan is a 74yo female with Sjogren's disease, nonspecific colitis, HTN, HLD, hypothyroidism who presents to clinic for follow-up of IBS.  Symptoms have been worsening as of late.  This includes bloating, diarrhea, urgency.  Symptoms seem to correlate with increasing stress levels/decreasing exercise.     PLAN:  -Check KUB to rule out overflow diarrhea.    -Continue Lomotil for now, advised to be careful taking too much and causing constipation with overflow diarrhea.   -Increase fiber supplementation and titrate as needed.  -Discussed the  importance of strict avoidance of dairy, pop, concentrated sugars.  -Patient should consider following back up with psychology for management of depression and anxiety.  -Advised patient to work on increasing exercise, stretching, stress reduction.      Following up in 6 months.   Total telephone time: 15 minutes    Jameson Strickland DO

## 2024-03-15 ENCOUNTER — HOSPITAL ENCOUNTER (OUTPATIENT)
Dept: RADIOLOGY | Facility: CLINIC | Age: 74
Discharge: HOME | End: 2024-03-15
Payer: MEDICARE

## 2024-03-15 DIAGNOSIS — K58.2 MIXED IRRITABLE BOWEL SYNDROME: ICD-10-CM

## 2024-03-15 PROCEDURE — 74018 RADEX ABDOMEN 1 VIEW: CPT | Performed by: RADIOLOGY

## 2024-03-15 PROCEDURE — 74018 RADEX ABDOMEN 1 VIEW: CPT

## 2024-03-16 DIAGNOSIS — D50.9 IRON DEFICIENCY ANEMIA, UNSPECIFIED IRON DEFICIENCY ANEMIA TYPE: ICD-10-CM

## 2024-03-16 RX ORDER — FERROUS SULFATE 325(65) MG
325 TABLET ORAL EVERY OTHER DAY
Qty: 15 TABLET | Refills: 11 | COMMUNITY
Start: 2024-03-16 | End: 2024-03-27 | Stop reason: SDUPTHER

## 2024-03-16 NOTE — RESULT ENCOUNTER NOTE
Rosaline    Thanks for doing the labs.  I am glad the iron level has improved substantially.  You can reduce the iron to every other day.    The sodium has improved a bit as well.  I am glad that the kidney lab, the creatinine is normal.    Finally the mild anemia is similar to where this was 3 years ago.  Both the mild anemia and a mildly reduced sodium will both be rechecked with the preadmission labs next month.      Looking forward to seeing you later next month as scheduled for the presurgical visit.    Bhavik Tristan MD

## 2024-03-27 ENCOUNTER — OFFICE VISIT (OUTPATIENT)
Dept: PRIMARY CARE | Facility: CLINIC | Age: 74
End: 2024-03-27
Payer: MEDICARE

## 2024-03-27 VITALS
BODY MASS INDEX: 35.58 KG/M2 | DIASTOLIC BLOOD PRESSURE: 82 MMHG | OXYGEN SATURATION: 95 % | WEIGHT: 200.8 LBS | RESPIRATION RATE: 16 BRPM | SYSTOLIC BLOOD PRESSURE: 127 MMHG | TEMPERATURE: 97.7 F | HEART RATE: 84 BPM | HEIGHT: 63 IN

## 2024-03-27 DIAGNOSIS — D50.9 IRON DEFICIENCY ANEMIA, UNSPECIFIED IRON DEFICIENCY ANEMIA TYPE: Chronic | ICD-10-CM

## 2024-03-27 DIAGNOSIS — I10 BENIGN ESSENTIAL HYPERTENSION: ICD-10-CM

## 2024-03-27 DIAGNOSIS — E66.01 CLASS 2 SEVERE OBESITY WITH SERIOUS COMORBIDITY AND BODY MASS INDEX (BMI) OF 35.0 TO 35.9 IN ADULT, UNSPECIFIED OBESITY TYPE (MULTI): Chronic | ICD-10-CM

## 2024-03-27 DIAGNOSIS — F43.21 SITUATIONAL DEPRESSION: Chronic | ICD-10-CM

## 2024-03-27 DIAGNOSIS — Z01.818 PREOPERATIVE CLEARANCE: ICD-10-CM

## 2024-03-27 DIAGNOSIS — D64.9 MILD ANEMIA: Primary | Chronic | ICD-10-CM

## 2024-03-27 DIAGNOSIS — H34.8392 BRANCH RETINAL VEIN OCCLUSION, UNSPECIFIED COMPLICATION STATUS, UNSPECIFIED LATERALITY (CMS-HCC): Chronic | ICD-10-CM

## 2024-03-27 DIAGNOSIS — E53.8 VITAMIN B12 DEFICIENCY: Chronic | ICD-10-CM

## 2024-03-27 DIAGNOSIS — C44.310 BASAL CELL CARCINOMA (BCC) OF SKIN OF FACE, UNSPECIFIED PART OF FACE: Chronic | ICD-10-CM

## 2024-03-27 DIAGNOSIS — M17.11 UNILATERAL PRIMARY OSTEOARTHRITIS, RIGHT KNEE: Chronic | ICD-10-CM

## 2024-03-27 DIAGNOSIS — E55.9 VITAMIN D DEFICIENCY: ICD-10-CM

## 2024-03-27 DIAGNOSIS — R73.03 PREDIABETES: Chronic | ICD-10-CM

## 2024-03-27 DIAGNOSIS — E78.5 DYSLIPIDEMIA, GOAL LDL BELOW 100: Chronic | ICD-10-CM

## 2024-03-27 PROCEDURE — 3008F BODY MASS INDEX DOCD: CPT | Performed by: INTERNAL MEDICINE

## 2024-03-27 PROCEDURE — 1159F MED LIST DOCD IN RCRD: CPT | Performed by: INTERNAL MEDICINE

## 2024-03-27 PROCEDURE — 3074F SYST BP LT 130 MM HG: CPT | Performed by: INTERNAL MEDICINE

## 2024-03-27 PROCEDURE — 99214 OFFICE O/P EST MOD 30 MIN: CPT | Performed by: INTERNAL MEDICINE

## 2024-03-27 PROCEDURE — 3079F DIAST BP 80-89 MM HG: CPT | Performed by: INTERNAL MEDICINE

## 2024-03-27 PROCEDURE — 1123F ACP DISCUSS/DSCN MKR DOCD: CPT | Performed by: INTERNAL MEDICINE

## 2024-03-27 PROCEDURE — 1157F ADVNC CARE PLAN IN RCRD: CPT | Performed by: INTERNAL MEDICINE

## 2024-03-27 PROCEDURE — G2211 COMPLEX E/M VISIT ADD ON: HCPCS | Performed by: INTERNAL MEDICINE

## 2024-03-27 PROCEDURE — 1160F RVW MEDS BY RX/DR IN RCRD: CPT | Performed by: INTERNAL MEDICINE

## 2024-03-27 RX ORDER — FERROUS SULFATE 325(65) MG
325 TABLET ORAL
Qty: 15 TABLET | Refills: 11 | COMMUNITY
Start: 2024-03-27 | End: 2024-04-01 | Stop reason: SDUPTHER

## 2024-03-27 ASSESSMENT — PATIENT HEALTH QUESTIONNAIRE - PHQ9
2. FEELING DOWN, DEPRESSED OR HOPELESS: NOT AT ALL
1. LITTLE INTEREST OR PLEASURE IN DOING THINGS: NOT AT ALL
SUM OF ALL RESPONSES TO PHQ9 QUESTIONS 1 AND 2: 0

## 2024-03-27 ASSESSMENT — ENCOUNTER SYMPTOMS
LOSS OF SENSATION IN FEET: 0
DEPRESSION: 0
OCCASIONAL FEELINGS OF UNSTEADINESS: 1

## 2024-03-27 NOTE — PROGRESS NOTES
"Subjective   Patient ID: Yamilex Ye is a 73 y.o. female who presents for Follow-up.    Here for quarterly visit.  Overall doing well for the most part    Activities significantly curtailed with right knee surgery planned 4/18 with Dr. Newman    No exertional chest pain, palpitations, dizziness, orthopnea or pedal edema.  The patient denies cough, dyspnea, wheezing or hemoptysis.         Review of Systems    Objective   /82 (BP Location: Left arm, Patient Position: Sitting, BP Cuff Size: Large adult)   Pulse 84   Temp 36.5 °C (97.7 °F)   Resp 16   Ht 1.6 m (5' 3\")   Wt 91.1 kg (200 lb 12.8 oz)   SpO2 95%   BMI 35.57 kg/m²     Physical Exam  Vitals reviewed.   Constitutional:       Appearance: Normal appearance.   HENT:      Head: Normocephalic and atraumatic.   Eyes:      General: No scleral icterus.        Right eye: No discharge.         Left eye: No discharge.      Extraocular Movements: Extraocular movements intact.      Conjunctiva/sclera: Conjunctivae normal.      Pupils: Pupils are equal, round, and reactive to light.   Cardiovascular:      Rate and Rhythm: Normal rate and regular rhythm.      Pulses: Normal pulses.      Heart sounds: Normal heart sounds.   Pulmonary:      Effort: Pulmonary effort is normal.      Breath sounds: Normal breath sounds. No wheezing or rhonchi.   Musculoskeletal:         General: No deformity or signs of injury. Normal range of motion.      Cervical back: Normal range of motion and neck supple. No rigidity or tenderness.   Lymphadenopathy:      Cervical: No cervical adenopathy.   Skin:     General: Skin is warm and dry.      Findings: No rash.   Neurological:      General: No focal deficit present.      Mental Status: She is alert and oriented to person, place, and time. Mental status is at baseline.      Cranial Nerves: No cranial nerve deficit.      Sensory: No sensory deficit.      Gait: Gait normal.   Psychiatric:         Mood and Affect: Mood normal.         " Behavior: Behavior normal.         Thought Content: Thought content normal.         Judgment: Judgment normal.         Assessment/Plan   Problem List Items Addressed This Visit             ICD-10-CM    Mild anemia - Primary D64.9    Relevant Medications    ferrous sulfate, 325 mg ferrous sulfate, (FerrouSuL) tablet    Other Relevant Orders    CBC    Basic Metabolic Panel    Iron and TIBC    Vitamin B12    Folate    Basal cell carcinoma C44.91    BRVO (branch retinal vein occlusion) H34.8392    Dyslipidemia, goal LDL below 100 E78.5    Situational depression F43.21    Vitamin B12 deficiency E53.8    Vitamin D deficiency E55.9    Benign essential hypertension I10    Unilateral primary osteoarthritis, right knee M17.11    Class 2 severe obesity with serious comorbidity and body mass index (BMI) of 35.0 to 35.9 in adult (CMS/[facility]) E66.01, Z68.35    Prediabetes R73.03     Other Visit Diagnoses         Codes    Preoperative clearance     Z01.818          Benzodiazepines:  What is the patient's goal of therapy?   Improve anxiety  Is this being achieved with current treatment? Yes, with medication      CSA:  23   PDMP: 3/27/24  UDS:  23   ADRIANE-7:   23  (score =3)    Activities of Daily Living:   Is your overall impression that this patient is benefiting (symptom reduction outweighs side effects) from benzodiazepine therapy? Yes     1. Physical Functioning: Better  2. Family Relationship: Better  3. Social Relationship: Better  4. Mood: Better  5. Sleep Patterns: Better  6. Overall Function: Better    Situational anxiety/caregiver stress/ depression-very difficult for several years with her 's dementia declining course and subsequent passing. Support provided. She will continue the additional Lexapro as ordered. Her   mid 2022. She was advised to start spending time around people by her therapist.. She plans to spend a bit more time with friends at the [employer]. Unfortunately  with her knee she cannot do Czech dancing that but at some point might.           Doing better, but lonely often. Hard to meet companions.  She no longer sees her counselor. Support provided.  She does feel lonely.  She does not want to do anything in medical she states.  Suggested volunteering with people as she enjoys being around people.            3/24-it has been a challenging winter.  First she had multiple problems with her 3 facial Moh's procedures within 4 months since December, now she has to have her knee replaced, her family has been supportive she is looking forward to [facility] but it has been challenging alone since her            Right knee injury--improved after injection with Dr. Israel-brace provided-unfortunately she injured it again later in the summer at a baseball game        Right Total knee replacement planned 24 w/  Dr. Newman.  We spoke at length.  In light of her unremarkable history, exam, and assuming the PAT EKG, and labs are unremarkable, I feel she would be at a low risk for cardiopulmonary complications from the planned procedure.  She understands she needs to refrain from splinting or NSAIDs 10 days before the procedure.  She will take her blood pressure medicine with sips of water in the morning of surgery and then take the rest of her medications later in the day when she is taking orals.  It is anticipated she will do home physical therapy for a week or so and then transition to outpatient physical therapy with her therapist, Presley           Hypertension-she will continue lisinopril            BP improved on recheck.     Hyponatremia- mild; will follow             -sodium 127-a bit down from 131 in May 2023    Dyslipidemia-tolerating atorvastatin.  Goal LDL under 100             -.  For now we will continue    Prediabetes/elevated weight            -fasting blood sugar 124, A1c 5.8%.  BMI 34.4 she will meet with our pharmacy team to consider  one of the newer medications     Elevated 10 year cardiac risk assessment- 10 year cardiac risk assessment at 9.9% January 2019 reduces to 7.4% taking statin, 5.4% with statin and blood pressure Rx. She will continue lisinopril and atorvastatin. She will continue exercise when her schedule permits     Exercise routine-she will advance as tolerated.              She has been using a - Vel, and hopes to restart Silver Sneakers when she is able tolerate it.  In the past-encouraged water walking 3 x wk at [facility]'s pool              Exercise on hold with her right knee injury summer 2023    Lymphedema- occasional while flying. Uses compression hoses. Not problematic at this time.      Hypothyroidism-we'll continue to follow thyroid labs with present thyroid supplement prescription.     Mild anemia-she will continue her iron and B12 and will reassess labs before follow-up    Left knee injury-November 2022-she has worked with Dr. Israel who did an MRI which shows nondisplaced tibial fracture at the insertion of the PCL, possibly a meniscal tear as well as patellar arthritis. She is doing physical therapy. Presently using a cane. She will continue caution. Handicap parking placard provided January 2023               IBS / Recurrent diarrhea alternating with constipation- initially diagnosed as colitis- noted on early 2020 colonoscopy per Dr. Ibarra. Lexapro was discontinued. She has used Lomotil as needed from Dr. Ibarra. Colonoscopy updated January 2021- significantly improved she states.  With less stress, there is less diarrhea she notes. Overall improved she still uses the Lomotil occasionally. Encouraged dietary caution with her upcoming plans to visit a [employer] restaurant.                 She saw Dr. Strickland early May '23. He rec'd more metamucil - now on 2 cap/day. Colonoscopy planned for 2024. Mornings still challenging w/ am diarrhea.  She uses Lomotil-she will get refills from him.   Anticipates a colonoscopy in the spring 2024              She continues Lomotil as needed and we will see him in 3/24    Acid reflux-stable with PPI- which she will continue especially as she is on the naproxen           Anticipate EGD in 2024.  Smaller meals help     Cholelithiasis- asymptomatic     Family history of colon cancer-she will continue colonoscopy care as below-at least every 5 years.    Colonoscopy updated January 2021. Next in 3 years-January 2024            She met w/ Dr. Strickland 3/24; she'll see him back at 6 mo, 9/24.  For now colonoscopy on hold    Lumbar spasms-mainly in the SI joint area-improved after working with her physical therapist, Presley for exercises    S/p lumbar surgery 4/17/19 per Dr. Ward for L 5 cyst--then her second lumbar surgery July 2020 laminectomy. Ongoing pain has been challenging. She will continue exercises, and follow-up with her physical therapist Presley as needed. She will see Dr. Ward as well. She will continue stretching.   X-rays completed per Dr. Ward. Unremarkable.        History of Right lower quadrant abdominal wall hernia-surgery w/ Dr. Ravindra Louise, and Dr. Finnegan at [facility] Feb. 18. Improved        Gynecologic care / history of vaginal hysterectomy she will follow-up with GYN as needed     Annual mammogram- updated January 2024     Vitamin D deficiency- encouraged patient to continue vitamin D daily as ordered. Will consider vitamin D level regularly.     Vitamin B12 deficiency- improved on 01/23 labs.             B 12 still high in 5/23 - she'll reduce B 12 to every other day     Seasonal allergies-she will use Zyrtec seasonally as needed        History of skin cancer/rosacea-  history of Mohs surgery, she understands importance of sunscreen   She continues to follow at [facility]-now working with Dr. Connie Kelly who does laser treatments on her cheeks mainly presently.             12/23-she has had Mohs surgery on her chin area which  has been complicated by dehiscence requiring debridement.  She will continue to work with the dermatology department at [facility].  She states that she has a Mohs procedure between her eyebrows in January 2024              3/24; s/p 3 Mohs on face since 12/23 per Sarahi Tabor MD at [facility]. Follow up soon, w/ facial derm abraision soon      Ophthalmology care-she will see Dr. Cade as scheduled. Sadly her right eye has chronically poor vision from a remote retinal vein occlusion. The left eye has dry eyes and she may need a keratotomy and contact lens implanted permanently.  Right cataract surgery completed 12/22, with noticeably better vision. Left scheduled for 2/23. Left blepharoplasty  3/23 per Dr. Lloyd.             Vision improved and she is pleased with the results at this. She will continue with Dr. Cade- now at [facility]     Hearing concerns- audiology evaluation with audiology and ENT completed 12/22.      Flu shot each fall- updated 8/23     Covid series-completed. Covid Booster completed. Additional booster shot updated 9/22.  Following her COVID illness mid October, she will get a COVID booster mid January    RSV vaccination suggested     Shingrix series completed    Tdap before 2024     Follow-up in 3 months, sooner with concerns     Charting was completed using voice recognition technology and may include unintended errors.

## 2024-03-28 ENCOUNTER — OFFICE VISIT (OUTPATIENT)
Dept: ORTHOPEDIC SURGERY | Facility: CLINIC | Age: 74
End: 2024-03-28
Payer: MEDICARE

## 2024-03-28 DIAGNOSIS — M17.11 PRIMARY OSTEOARTHRITIS OF RIGHT KNEE: Primary | ICD-10-CM

## 2024-03-28 PROBLEM — L02.219 CELLULITIS AND ABSCESS OF TRUNK: Status: RESOLVED | Noted: 2019-12-18 | Resolved: 2024-03-28

## 2024-03-28 PROBLEM — E66.812 CLASS 2 SEVERE OBESITY WITH SERIOUS COMORBIDITY AND BODY MASS INDEX (BMI) OF 35.0 TO 35.9 IN ADULT: Status: ACTIVE | Noted: 2024-03-28

## 2024-03-28 PROBLEM — S82.142A CLOSED FRACTURE OF LEFT TIBIAL PLATEAU: Status: RESOLVED | Noted: 2023-05-17 | Resolved: 2024-03-28

## 2024-03-28 PROBLEM — R73.03 PREDIABETES: Status: ACTIVE | Noted: 2024-03-28

## 2024-03-28 PROBLEM — E66.01 OBESITY, MORBID (MULTI): Status: ACTIVE | Noted: 2024-03-28

## 2024-03-28 PROBLEM — L03.319 CELLULITIS AND ABSCESS OF TRUNK: Status: RESOLVED | Noted: 2019-12-18 | Resolved: 2024-03-28

## 2024-03-28 PROCEDURE — 1036F TOBACCO NON-USER: CPT | Performed by: STUDENT IN AN ORGANIZED HEALTH CARE EDUCATION/TRAINING PROGRAM

## 2024-03-28 PROCEDURE — 3008F BODY MASS INDEX DOCD: CPT | Performed by: STUDENT IN AN ORGANIZED HEALTH CARE EDUCATION/TRAINING PROGRAM

## 2024-03-28 PROCEDURE — 1123F ACP DISCUSS/DSCN MKR DOCD: CPT | Performed by: STUDENT IN AN ORGANIZED HEALTH CARE EDUCATION/TRAINING PROGRAM

## 2024-03-28 PROCEDURE — 1157F ADVNC CARE PLAN IN RCRD: CPT | Performed by: STUDENT IN AN ORGANIZED HEALTH CARE EDUCATION/TRAINING PROGRAM

## 2024-03-28 PROCEDURE — 99213 OFFICE O/P EST LOW 20 MIN: CPT | Performed by: STUDENT IN AN ORGANIZED HEALTH CARE EDUCATION/TRAINING PROGRAM

## 2024-03-28 PROCEDURE — 1160F RVW MEDS BY RX/DR IN RCRD: CPT | Performed by: STUDENT IN AN ORGANIZED HEALTH CARE EDUCATION/TRAINING PROGRAM

## 2024-03-28 PROCEDURE — 1159F MED LIST DOCD IN RCRD: CPT | Performed by: STUDENT IN AN ORGANIZED HEALTH CARE EDUCATION/TRAINING PROGRAM

## 2024-03-28 NOTE — H&P
History Of Present Illness  Yamilex Ye is a 73 y.o. female presenting with right knee pain and known DJD.     Past Medical History  She has a past medical history of Body mass index (BMI) 34.0-34.9, adult (07/20/2021), Encounter for general adult medical examination without abnormal findings (06/01/2018), Encounter for general adult medical examination without abnormal findings (04/21/2017), Fatty (change of) liver, not elsewhere classified, Influenza due to other identified influenza virus with other respiratory manifestations (03/25/2016), Other conditions influencing health status, Other general symptoms and signs (03/25/2016), Other injury of unspecified body region, initial encounter, Personal history of other diseases of the digestive system, Personal history of other diseases of the digestive system, Personal history of other diseases of the digestive system, Personal history of other diseases of the digestive system, Personal history of other endocrine, nutritional and metabolic disease, Personal history of other infectious and parasitic diseases (02/28/2020), Personal history of other specified conditions, and Unspecified fracture of shaft of humerus, unspecified arm, initial encounter for closed fracture.    Surgical History  She has a past surgical history that includes Colonoscopy (12/27/2012); Other surgical history (05/10/2019); Total vaginal hysterectomy (12/12/2014); Appendectomy (12/12/2014); Skin cancer excision (04/16/2016); Other surgical history (12/12/2014); Other surgical history (12/12/2014); Small intestine surgery (12/12/2014); and Other surgical history (12/12/2014).     Social History  She reports that she has never smoked. She has never used smokeless tobacco. She reports current alcohol use of about 2.0 - 3.0 standard drinks of alcohol per week. No history on file for drug use.    Family History  No family history on file.     Allergies  Adhesive tape-silicones    Review of  Systems   CONSTITUTIONAL: Denies weight loss, fever and chills.    - HEENT: Denies changes in vision and hearing.    - RESPIRATORY: Denies SOB and cough.    - CV: Denies palpitations and CP.    - GI: Denies abdominal pain, nausea, vomiting and diarrhea.    - : Denies dysuria and urinary frequency.    - MSK: See physical exam findings     - SKIN: Denies rash and pruritus.    - NEUROLOGICAL: Denies headache and syncope.    - PSYCHIATRIC: Denies recent changes in mood. Denies anxiety and depression.      Physical Exam  - GENERAL: Alert and oriented x 3. No acute distress. Well-nourished.    - EYES: EOMI. Anicteric.    - HENT: Moist mucous membranes. No scleral icterus. No cervical lymphadenopathy.    - LUNGS: Clear to auscultation bilaterally. No accessory muscle use.    - CARDIOVASCULAR: Regular rate and rhythm. No murmur. No JVD.    - ABDOMEN: Soft, non-tender and non-distended. No palpable masses.    - EXTREMITIES: Tenderness diffusely with crepitus to ROM     - NEUROLOGIC: No focal neurological deficits. CN II-XII grossly intact, but not individually tested.    - PSYCHIATRIC: Cooperative. Appropriate mood and affect.      Last Recorded Vitals  There were no vitals taken for this visit.    Relevant Results      Scheduled medications    Continuous medications    PRN medications    No results found for this or any previous visit (from the past 24 hour(s)).    Assessment/Plan   Diagnoses and all orders for this visit:  Primary osteoarthritis of right knee    We discussed right total knee arthroplasty with the patient at length and she is agreeable and would like to proceed.     She denies history of DVT/PE, no allergies to metal.        I spent 10 minutes in the professional and overall care of this patient.      Jason Reinoso PA-C

## 2024-03-28 NOTE — PROGRESS NOTES
History of Present Illness:  Patient with known osteoarthritis of the knee who presents today for repeat evaluation.  The patient notes persistent pain as well as some occasional mechanical symptoms.  The patient has tried the following modalities: RICE, NSAIDs, and CSI/gel injections.    She is here today for pre-op for right TKA.     Review of Systems:   GENERAL: Negative for malaise, significant weight loss, fever  MUSCULOSKELETAL: see HPI  NEURO:  Negative    Physical Examination:  Right Knee:  Skin healthy and intact  No gross swelling or ecchymosis  Alignment: Valgus  Effusion: Trace  ROM:  0-120  Crepitance with range of motion  No pain with internal rotation of the hip  Tenderness to palpation: over medial and lateral joint line and with patellar compression     No laxity to valgus stress  No laxity to varus stress  Negative Lachman´s test  Negative posterior drawer test  Mild pain with Saranya´s test    Neurovascular exam normal distally  2+ DP pulse and good cap refill    Imaging:  Reviewed, degenerative joint disease noted moderate to severe    Assessment:  Patient with known osteoarthritis of the right knee    Plan:  We reviewed an evidence-based approach to OA of the knee.  We discussed past treatments as well options for future treatment.  We discussed NSAIDS (risks and benefits), low impact activities.   The patient has failed to improve with multiple non-operative modalities.  There is increasing difficulty with activities of daily living and concern for falls.  The patient is endorsing severe pain and disability.      We had a lengthy discussion regarding total knee arthroplasty including the orthopaedic risks, including but not limited to: stiffness, infection, hematoma, early aseptic loosening, neurologic or vascular injury, clicking, difficulty kneeling and incomplete relief of pain.  We reviewed the medical risks, including but not limited to: deep venous thrombosis, pulmonary embolism, and  cardiovascular/pulmonary issues.    We discussed the anticipated longevity of the implants and potential for revision surgery.  We also discussed anticoagulation, rehabilitation goals, and the hospital course.  We discussed the likelihood of opioid analgesics and risks associated with them.    The next step is to obtain medical risk stratification and encouraged the pre-operative information seminar at the hospital.  We are happy to provide assistance and counseling if the patient has any additional concerns.     Jason Reinoso PA-C

## 2024-04-01 DIAGNOSIS — D64.9 MILD ANEMIA: Chronic | ICD-10-CM

## 2024-04-01 DIAGNOSIS — D50.9 IRON DEFICIENCY ANEMIA, UNSPECIFIED IRON DEFICIENCY ANEMIA TYPE: Chronic | ICD-10-CM

## 2024-04-01 DIAGNOSIS — E03.9 ACQUIRED HYPOTHYROIDISM: ICD-10-CM

## 2024-04-01 RX ORDER — FERROUS SULFATE 325(65) MG
325 TABLET ORAL
Qty: 90 TABLET | Refills: 3 | Status: SHIPPED | OUTPATIENT
Start: 2024-04-01

## 2024-04-01 RX ORDER — LEVOTHYROXINE SODIUM 125 UG/1
125 TABLET ORAL
Qty: 90 TABLET | Refills: 1 | Status: SHIPPED | OUTPATIENT
Start: 2024-04-01 | End: 2024-12-27

## 2024-04-03 ENCOUNTER — TELEPHONE (OUTPATIENT)
Dept: INPATIENT UNIT | Facility: HOSPITAL | Age: 74
End: 2024-04-03
Payer: MEDICARE

## 2024-04-03 NOTE — TELEPHONE ENCOUNTER
Thank you for taking my call 3/26/24. All questions were answered at the time of the call, but please do not hesitate to reach out to me at [phone] with any new questions or concerns.     We confirmed that your plan would be to discharge home with help from family.    We confirmed that you do have a front wheeled walker.    PRO surveys answered by Yamilex and entered into chart.    ROBINSON BacaN, RN  Orthopedic   [facility] [place]  [phone]

## 2024-04-05 ENCOUNTER — LAB (OUTPATIENT)
Dept: LAB | Facility: LAB | Age: 74
End: 2024-04-05
Payer: MEDICARE

## 2024-04-05 ENCOUNTER — PRE-ADMISSION TESTING (OUTPATIENT)
Dept: PREADMISSION TESTING | Facility: HOSPITAL | Age: 74
End: 2024-04-05
Payer: MEDICARE

## 2024-04-05 VITALS — HEIGHT: 63 IN | WEIGHT: 202.16 LBS | BODY MASS INDEX: 35.82 KG/M2

## 2024-04-05 DIAGNOSIS — R79.9 ABNORMAL FINDING OF BLOOD CHEMISTRY, UNSPECIFIED: ICD-10-CM

## 2024-04-05 DIAGNOSIS — Z01.818 PREOP TESTING: ICD-10-CM

## 2024-04-05 DIAGNOSIS — R94.5 ABNORMAL RESULTS OF LIVER FUNCTION STUDIES: ICD-10-CM

## 2024-04-05 LAB
ALBUMIN SERPL BCP-MCNC: 4.4 G/DL (ref 3.4–5)
ALP SERPL-CCNC: 123 U/L (ref 33–136)
ALT SERPL W P-5'-P-CCNC: 29 U/L (ref 7–45)
ANION GAP SERPL CALC-SCNC: 14 MMOL/L (ref 10–20)
AST SERPL W P-5'-P-CCNC: 35 U/L (ref 9–39)
BASOPHILS # BLD AUTO: 0.05 X10*3/UL (ref 0–0.1)
BASOPHILS NFR BLD AUTO: 0.5 %
BILIRUB SERPL-MCNC: 0.9 MG/DL (ref 0–1.2)
BUN SERPL-MCNC: 9 MG/DL (ref 6–23)
CALCIUM SERPL-MCNC: 9.3 MG/DL (ref 8.6–10.3)
CHLORIDE SERPL-SCNC: 92 MMOL/L (ref 98–107)
CO2 SERPL-SCNC: 25 MMOL/L (ref 21–32)
CREAT SERPL-MCNC: 0.72 MG/DL (ref 0.5–1.05)
EGFRCR SERPLBLD CKD-EPI 2021: 90 ML/MIN/1.73M*2
EOSINOPHIL # BLD AUTO: 0.01 X10*3/UL (ref 0–0.4)
EOSINOPHIL NFR BLD AUTO: 0.1 %
ERYTHROCYTE [DISTWIDTH] IN BLOOD BY AUTOMATED COUNT: 13 % (ref 11.5–14.5)
EST. AVERAGE GLUCOSE BLD GHB EST-MCNC: 111 MG/DL
GLUCOSE SERPL-MCNC: 106 MG/DL (ref 74–99)
HBA1C MFR BLD: 5.5 %
HCT VFR BLD AUTO: 36.4 % (ref 36–46)
HGB BLD-MCNC: 12.4 G/DL (ref 12–16)
IMM GRANULOCYTES # BLD AUTO: 0.02 X10*3/UL (ref 0–0.5)
IMM GRANULOCYTES NFR BLD AUTO: 0.2 % (ref 0–0.9)
INR PPP: 1.1 (ref 0.9–1.1)
LYMPHOCYTES # BLD AUTO: 1.64 X10*3/UL (ref 0.8–3)
LYMPHOCYTES NFR BLD AUTO: 18 %
MCH RBC QN AUTO: 35 PG (ref 26–34)
MCHC RBC AUTO-ENTMCNC: 34.1 G/DL (ref 32–36)
MCV RBC AUTO: 103 FL (ref 80–100)
MONOCYTES # BLD AUTO: 0.59 X10*3/UL (ref 0.05–0.8)
MONOCYTES NFR BLD AUTO: 6.5 %
NEUTROPHILS # BLD AUTO: 6.8 X10*3/UL (ref 1.6–5.5)
NEUTROPHILS NFR BLD AUTO: 74.7 %
NRBC BLD-RTO: 0 /100 WBCS (ref 0–0)
PLATELET # BLD AUTO: 159 X10*3/UL (ref 150–450)
POTASSIUM SERPL-SCNC: 4.7 MMOL/L (ref 3.5–5.3)
PROT SERPL-MCNC: 7.4 G/DL (ref 6.4–8.2)
PROTHROMBIN TIME: 12.4 SECONDS (ref 9.8–12.8)
RBC # BLD AUTO: 3.54 X10*6/UL (ref 4–5.2)
SODIUM SERPL-SCNC: 126 MMOL/L (ref 136–145)
WBC # BLD AUTO: 9.1 X10*3/UL (ref 4.4–11.3)

## 2024-04-05 PROCEDURE — 93005 ELECTROCARDIOGRAM TRACING: CPT

## 2024-04-05 PROCEDURE — 87081 CULTURE SCREEN ONLY: CPT | Mod: STJLAB | Performed by: ORTHOPAEDIC SURGERY

## 2024-04-05 PROCEDURE — 83036 HEMOGLOBIN GLYCOSYLATED A1C: CPT

## 2024-04-05 PROCEDURE — 80053 COMPREHEN METABOLIC PANEL: CPT

## 2024-04-05 PROCEDURE — 36415 COLL VENOUS BLD VENIPUNCTURE: CPT

## 2024-04-05 PROCEDURE — 85025 COMPLETE CBC W/AUTO DIFF WBC: CPT

## 2024-04-05 PROCEDURE — 85610 PROTHROMBIN TIME: CPT

## 2024-04-05 RX ORDER — CHLORHEXIDINE GLUCONATE ORAL RINSE 1.2 MG/ML
SOLUTION DENTAL
Qty: 473 ML | Refills: 0 | Status: SHIPPED | OUTPATIENT
Start: 2024-04-05 | End: 2024-04-19 | Stop reason: HOSPADM

## 2024-04-05 NOTE — PREPROCEDURE INSTRUCTIONS
Medication List            Accurate as of April 5, 2024 11:11 AM. Always use your most recent med list.                albuterol 90 mcg/actuation aerosol powdr breath activated inhaler  Inhale 2 puffs every 6 hours if needed for wheezing (for cough or wheezing).  Medication Adjustments for Surgery: Take morning of surgery with sip of water, no other fluids  Notes to patient: As needed     atorvastatin 10 mg tablet  Commonly known as: Lipitor  Take 1 tablet (10 mg) by mouth once daily. For cholesterol  Medication Adjustments for Surgery: Take morning of surgery with sip of water, no other fluids     chlorhexidine 0.12 % solution  Commonly known as: Peridex  15 milliliter(s) orally once a day for 2 doses 15 ml  the night before surgery and 15 ml morning of surgery - swish for 30 seconds -DO NOT SWALLOW, SPIT OUT     cholecalciferol 50 mcg (2,000 unit) capsule  Commonly known as: Vitamin D-3  Medication Adjustments for Surgery: Stop 7 days before surgery     cyanocobalamin 1,000 mcg tablet  Commonly known as: Vitamin B-12     diphenoxylate-atropine 2.5-0.025 mg tablet  Commonly known as: Lomotil  Take 2 tablets by mouth in the morning and 2 tablets in the evening and 2 tablets before bedtime.     escitalopram 20 mg tablet  Commonly known as: Lexapro  Take 1 tablet (20 mg) by mouth once daily.  Medication Adjustments for Surgery: Take morning of surgery with sip of water, no other fluids     ferrous sulfate (325 mg ferrous sulfate) tablet  Commonly known as: FerrouSuL  Take 1 tablet by mouth once daily with breakfast.  Medication Adjustments for Surgery: Continue until night before surgery     fluticasone 50 mcg/actuation nasal spray  Commonly known as: Flonase  Use 2 sprays twice daily for 1 to 2 weeks, then once daily until congestion clears  Medication Adjustments for Surgery: Take morning of surgery with sip of water, no other fluids     Heliocare 240 mg capsule  Generic drug: polypodium leucotomos  extract  Medication Adjustments for Surgery: Stop 7 days before surgery     levothyroxine 125 mcg tablet  Commonly known as: Synthroid, Levoxyl  Take 1 tablet (125 mcg) by mouth once daily in the morning. Take before meals.  Medication Adjustments for Surgery: Take morning of surgery with sip of water, no other fluids     lisinopril 10 mg tablet  Take 1 tablet (10 mg) by mouth once daily. For blood pressure  Medication Adjustments for Surgery: Stop 1 day before surgery     LORazepam 0.5 mg tablet  Commonly known as: Ativan  Take 1 tablet (0.5 mg) by mouth once daily as needed for anxiety.  Medication Adjustments for Surgery: Take morning of surgery with sip of water, no other fluids     metroNIDAZOLE 0.75 % gel  Commonly known as: Metrogel     omega 3-dha-epa-fish oil 300-1,000 mg capsule,delayed release(DR/EC)  Medication Adjustments for Surgery: Stop 7 days before surgery     omeprazole 40 mg DR capsule  Commonly known as: PriLOSEC  Take 1 capsule (40 mg) by mouth 2 times a day.  Medication Adjustments for Surgery: Take morning of surgery with sip of water, no other fluids     ondansetron 4 mg tablet  Commonly known as: Zofran     white petrolatum-mineral oiL 94-3 % ophthalmic ointment  Commonly known as: Tears Naturale PM                                  PRE-OPERATIVE INSTRUCTIONS    You will receive notification one business day prior to your procedure to confirm your arrival time. It is important that you answer your phone and/or check your messages during this time. If you do not hear from the surgery center by 5 pm. the day before your procedure, please call [phone].     Please enter the building through the Outpatient entrance and take the elevator off the lobby to the 2nd floor then check in at the Outpatient Surgery desk on the 2nd floor.    INSTRUCTIONS:  Talk to your surgeon for instructions if you should stop your aspirin, blood thinner, or diabetes medicines.  DO NOT take any multivitamins or over  the counter supplements for 7-10 days before surgery.  If not being admitted, you must have an adult immediately available to drive you home after surgery. We also highly recommend you have someone stay with you for the entire day and night of your surgery.  For children having surgery, a parent or legal guardian must accompany them to the surgery center. If this is not possible, please call [phone] to make additional arrangements.  For adults who are unable to consent or make medical decisions for themselves, a legal guardian or Power of  must accompany them to the surgery center. If this is not possible, please call [phone] to make additional arrangements.  Wear comfortable, loose fitting clothing.  All jewelry and piercings must be removed. If you are unable to remove an item or have a dermal piercing, please be sure to tell the nurse when you arrive for surgery.  Nail polish and make-up must be removed.  Avoid smoking or consuming alcohol for 24 hours before surgery.  To help prevent infection, please take a shower/bath and wash your hair the night before and/or morning of surgery (or follow other specific bathing instructions provided).    Preoperative Fasting Guidelines    Why must I stop eating and drinking near surgery time?  With sedation, food or liquid in your stomach can enter your lungs causing serious complications  Increases nausea and vomiting    When do I need to stop eating and drinking before my surgery?  Do not eat any solid food after midnight the night before your surgery/procedure.  You may have up to TEN ounces of clear liquid until TWO hours before your instructed arrival time to the hospital.  This includes water, black tea/coffee, (no milk or cream) apple juice, and electrolyte drinks (Gatorade).   You may chew gum until TWO hours before your surgery/procedure    If you have any questions or concerns, please call Pre-Admission Testing at [phone].

## 2024-04-06 LAB
ATRIAL RATE: 74 BPM
P AXIS: 54 DEGREES
P OFFSET: 183 MS
P ONSET: 124 MS
PR INTERVAL: 176 MS
Q ONSET: 212 MS
QRS COUNT: 12 BEATS
QRS DURATION: 86 MS
QT INTERVAL: 394 MS
QTC CALCULATION(BAZETT): 437 MS
QTC FREDERICIA: 422 MS
R AXIS: 14 DEGREES
T AXIS: 31 DEGREES
T OFFSET: 409 MS
VENTRICULAR RATE: 74 BPM

## 2024-04-07 LAB — STAPHYLOCOCCUS SPEC CULT: NORMAL

## 2024-04-09 ENCOUNTER — APPOINTMENT (OUTPATIENT)
Dept: PRIMARY CARE | Facility: CLINIC | Age: 74
End: 2024-04-09
Payer: MEDICARE

## 2024-04-10 ENCOUNTER — TELEPHONE (OUTPATIENT)
Dept: ORTHOPEDIC SURGERY | Facility: CLINIC | Age: 74
End: 2024-04-10
Payer: MEDICARE

## 2024-04-10 NOTE — TELEPHONE ENCOUNTER
04/10/24  Spoke w/ pt and she does have a wheeled walker for post-op use.  Instructed to take it to the hospital so it is available for use w/ PT.

## 2024-04-16 RX ORDER — CEFAZOLIN SODIUM 2 G/100ML
2 INJECTION, SOLUTION INTRAVENOUS ONCE
Status: CANCELLED | OUTPATIENT
Start: 2024-04-18 | End: 2024-04-16

## 2024-04-16 RX ORDER — CELECOXIB 50 MG/1
200 CAPSULE ORAL ONCE
Status: CANCELLED | OUTPATIENT
Start: 2024-04-16 | End: 2024-04-16

## 2024-04-16 RX ORDER — SODIUM CHLORIDE, SODIUM LACTATE, POTASSIUM CHLORIDE, CALCIUM CHLORIDE 600; 310; 30; 20 MG/100ML; MG/100ML; MG/100ML; MG/100ML
100 INJECTION, SOLUTION INTRAVENOUS CONTINUOUS
Status: CANCELLED | OUTPATIENT
Start: 2024-04-18

## 2024-04-16 RX ORDER — TRANEXAMIC ACID 650 MG/1
1950 TABLET ORAL ONCE
Status: CANCELLED | OUTPATIENT
Start: 2024-04-16 | End: 2024-04-16

## 2024-04-16 RX ORDER — ACETAMINOPHEN 325 MG/1
650 TABLET ORAL ONCE
Status: CANCELLED | OUTPATIENT
Start: 2024-04-16 | End: 2024-04-16

## 2024-04-18 ENCOUNTER — ANESTHESIA (OUTPATIENT)
Dept: OPERATING ROOM | Facility: HOSPITAL | Age: 74
End: 2024-04-18
Payer: MEDICARE

## 2024-04-18 ENCOUNTER — HOSPITAL ENCOUNTER (OUTPATIENT)
Facility: HOSPITAL | Age: 74
Discharge: HOME | End: 2024-04-19
Attending: ORTHOPAEDIC SURGERY | Admitting: ORTHOPAEDIC SURGERY
Payer: MEDICARE

## 2024-04-18 ENCOUNTER — ANESTHESIA EVENT (OUTPATIENT)
Dept: OPERATING ROOM | Facility: HOSPITAL | Age: 74
End: 2024-04-18
Payer: MEDICARE

## 2024-04-18 DIAGNOSIS — Z96.651 TOTAL KNEE REPLACEMENT STATUS, RIGHT: ICD-10-CM

## 2024-04-18 DIAGNOSIS — M17.11 UNILATERAL PRIMARY OSTEOARTHRITIS, RIGHT KNEE: Primary | ICD-10-CM

## 2024-04-18 PROCEDURE — 2500000001 HC RX 250 WO HCPCS SELF ADMINISTERED DRUGS (ALT 637 FOR MEDICARE OP): Performed by: ORTHOPAEDIC SURGERY

## 2024-04-18 PROCEDURE — A27447 PR TOTAL KNEE ARTHROPLASTY: Performed by: NURSE ANESTHETIST, CERTIFIED REGISTERED

## 2024-04-18 PROCEDURE — C1713 ANCHOR/SCREW BN/BN,TIS/BN: HCPCS | Performed by: ORTHOPAEDIC SURGERY

## 2024-04-18 PROCEDURE — 27447 TOTAL KNEE ARTHROPLASTY: CPT | Performed by: STUDENT IN AN ORGANIZED HEALTH CARE EDUCATION/TRAINING PROGRAM

## 2024-04-18 PROCEDURE — 2500000004 HC RX 250 GENERAL PHARMACY W/ HCPCS (ALT 636 FOR OP/ED): Performed by: ORTHOPAEDIC SURGERY

## 2024-04-18 PROCEDURE — 2500000004 HC RX 250 GENERAL PHARMACY W/ HCPCS (ALT 636 FOR OP/ED): Performed by: ANESTHESIOLOGY

## 2024-04-18 PROCEDURE — 27447 TOTAL KNEE ARTHROPLASTY: CPT | Performed by: ORTHOPAEDIC SURGERY

## 2024-04-18 PROCEDURE — A27447 PR TOTAL KNEE ARTHROPLASTY: Performed by: ANESTHESIOLOGY

## 2024-04-18 PROCEDURE — 64447 NJX AA&/STRD FEMORAL NRV IMG: CPT | Performed by: ANESTHESIOLOGY

## 2024-04-18 PROCEDURE — C1776 JOINT DEVICE (IMPLANTABLE): HCPCS | Performed by: ORTHOPAEDIC SURGERY

## 2024-04-18 PROCEDURE — 2500000005 HC RX 250 GENERAL PHARMACY W/O HCPCS: Performed by: ANESTHESIOLOGY

## 2024-04-18 PROCEDURE — A6213 FOAM DRG >16<=48 SQ IN W/BDR: HCPCS | Performed by: ORTHOPAEDIC SURGERY

## 2024-04-18 PROCEDURE — 2500000004 HC RX 250 GENERAL PHARMACY W/ HCPCS (ALT 636 FOR OP/ED): Mod: JZ | Performed by: STUDENT IN AN ORGANIZED HEALTH CARE EDUCATION/TRAINING PROGRAM

## 2024-04-18 PROCEDURE — 7100000001 HC RECOVERY ROOM TIME - INITIAL BASE CHARGE: Performed by: ORTHOPAEDIC SURGERY

## 2024-04-18 PROCEDURE — 97161 PT EVAL LOW COMPLEX 20 MIN: CPT | Mod: GP

## 2024-04-18 PROCEDURE — 2780000003 HC OR 278 NO HCPCS: Performed by: ORTHOPAEDIC SURGERY

## 2024-04-18 PROCEDURE — 2720000007 HC OR 272 NO HCPCS: Performed by: ORTHOPAEDIC SURGERY

## 2024-04-18 PROCEDURE — 97116 GAIT TRAINING THERAPY: CPT | Mod: GP

## 2024-04-18 PROCEDURE — 2500000005 HC RX 250 GENERAL PHARMACY W/O HCPCS: Performed by: ORTHOPAEDIC SURGERY

## 2024-04-18 PROCEDURE — 2500000004 HC RX 250 GENERAL PHARMACY W/ HCPCS (ALT 636 FOR OP/ED): Performed by: NURSE ANESTHETIST, CERTIFIED REGISTERED

## 2024-04-18 PROCEDURE — 3600000018 HC OR TIME - INITIAL BASE CHARGE - PROCEDURE LEVEL SIX: Performed by: ORTHOPAEDIC SURGERY

## 2024-04-18 PROCEDURE — 7100000002 HC RECOVERY ROOM TIME - EACH INCREMENTAL 1 MINUTE: Performed by: ORTHOPAEDIC SURGERY

## 2024-04-18 PROCEDURE — G0378 HOSPITAL OBSERVATION PER HR: HCPCS

## 2024-04-18 PROCEDURE — 3700000001 HC GENERAL ANESTHESIA TIME - INITIAL BASE CHARGE: Performed by: ORTHOPAEDIC SURGERY

## 2024-04-18 PROCEDURE — 2500000006 HC RX 250 W HCPCS SELF ADMINISTERED DRUGS (ALT 637 FOR ALL PAYERS): Performed by: STUDENT IN AN ORGANIZED HEALTH CARE EDUCATION/TRAINING PROGRAM

## 2024-04-18 PROCEDURE — 3700000002 HC GENERAL ANESTHESIA TIME - EACH INCREMENTAL 1 MINUTE: Performed by: ORTHOPAEDIC SURGERY

## 2024-04-18 PROCEDURE — 2500000006 HC RX 250 W HCPCS SELF ADMINISTERED DRUGS (ALT 637 FOR ALL PAYERS): Mod: MUE | Performed by: ORTHOPAEDIC SURGERY

## 2024-04-18 PROCEDURE — 3600000017 HC OR TIME - EACH INCREMENTAL 1 MINUTE - PROCEDURE LEVEL SIX: Performed by: ORTHOPAEDIC SURGERY

## 2024-04-18 PROCEDURE — 7100000011 HC EXTENDED STAY RECOVERY HOURLY - NURSING UNIT

## 2024-04-18 PROCEDURE — 2500000005 HC RX 250 GENERAL PHARMACY W/O HCPCS: Performed by: NURSE ANESTHETIST, CERTIFIED REGISTERED

## 2024-04-18 PROCEDURE — A4217 STERILE WATER/SALINE, 500 ML: HCPCS | Mod: MUE | Performed by: ORTHOPAEDIC SURGERY

## 2024-04-18 PROCEDURE — 99100 ANES PT EXTEME AGE<1 YR&>70: CPT | Performed by: ANESTHESIOLOGY

## 2024-04-18 DEVICE — TIBIAL BEARING INSERT - CS
Type: IMPLANTABLE DEVICE | Site: KNEE | Status: FUNCTIONAL
Brand: TRIATHLON

## 2024-04-18 DEVICE — PRIMARY TIBIAL BASEPLATE
Type: IMPLANTABLE DEVICE | Site: KNEE | Status: FUNCTIONAL
Brand: TRIATHLON

## 2024-04-18 DEVICE — CRUCIATE RETAINING FEMORAL
Type: IMPLANTABLE DEVICE | Site: KNEE | Status: FUNCTIONAL
Brand: TRIATHLON

## 2024-04-18 DEVICE — ASYMMETRIC PATELLA
Type: IMPLANTABLE DEVICE | Site: KNEE | Status: FUNCTIONAL
Brand: TRIATHLON

## 2024-04-18 DEVICE — IMPLANTABLE DEVICE
Type: IMPLANTABLE DEVICE | Site: KNEE | Status: FUNCTIONAL
Brand: BIOMET® BONE CEMENT R

## 2024-04-18 RX ORDER — OXYCODONE HYDROCHLORIDE 5 MG/1
5 TABLET ORAL EVERY 4 HOURS PRN
Status: DISCONTINUED | OUTPATIENT
Start: 2024-04-18 | End: 2024-04-19 | Stop reason: HOSPADM

## 2024-04-18 RX ORDER — ONDANSETRON HYDROCHLORIDE 2 MG/ML
INJECTION, SOLUTION INTRAVENOUS AS NEEDED
Status: DISCONTINUED | OUTPATIENT
Start: 2024-04-18 | End: 2024-04-18

## 2024-04-18 RX ORDER — HYDRALAZINE HYDROCHLORIDE 20 MG/ML
5 INJECTION INTRAMUSCULAR; INTRAVENOUS EVERY 30 MIN PRN
Status: DISCONTINUED | OUTPATIENT
Start: 2024-04-18 | End: 2024-04-18 | Stop reason: HOSPADM

## 2024-04-18 RX ORDER — LEVOTHYROXINE SODIUM 125 UG/1
125 TABLET ORAL
Status: DISCONTINUED | OUTPATIENT
Start: 2024-04-19 | End: 2024-04-19 | Stop reason: HOSPADM

## 2024-04-18 RX ORDER — TRANEXAMIC ACID 650 MG/1
1950 TABLET ORAL ONCE
Status: COMPLETED | OUTPATIENT
Start: 2024-04-18 | End: 2024-04-18

## 2024-04-18 RX ORDER — ESCITALOPRAM OXALATE 20 MG/1
20 TABLET ORAL DAILY
Status: DISCONTINUED | OUTPATIENT
Start: 2024-04-18 | End: 2024-04-19 | Stop reason: HOSPADM

## 2024-04-18 RX ORDER — PROCHLORPERAZINE EDISYLATE 5 MG/ML
10 INJECTION INTRAMUSCULAR; INTRAVENOUS EVERY 6 HOURS PRN
Status: DISCONTINUED | OUTPATIENT
Start: 2024-04-18 | End: 2024-04-19 | Stop reason: HOSPADM

## 2024-04-18 RX ORDER — LISINOPRIL 10 MG/1
10 TABLET ORAL DAILY
Status: DISCONTINUED | OUTPATIENT
Start: 2024-04-18 | End: 2024-04-19 | Stop reason: HOSPADM

## 2024-04-18 RX ORDER — CEFAZOLIN SODIUM 2 G/100ML
2 INJECTION, SOLUTION INTRAVENOUS ONCE
Status: COMPLETED | OUTPATIENT
Start: 2024-04-18 | End: 2024-04-18

## 2024-04-18 RX ORDER — FENTANYL CITRATE 50 UG/ML
INJECTION, SOLUTION INTRAMUSCULAR; INTRAVENOUS AS NEEDED
Status: DISCONTINUED | OUTPATIENT
Start: 2024-04-18 | End: 2024-04-18

## 2024-04-18 RX ORDER — LORAZEPAM 0.5 MG/1
0.5 TABLET ORAL DAILY PRN
Status: DISCONTINUED | OUTPATIENT
Start: 2024-04-18 | End: 2024-04-19 | Stop reason: HOSPADM

## 2024-04-18 RX ORDER — OXYCODONE HYDROCHLORIDE 10 MG/1
10 TABLET ORAL EVERY 4 HOURS PRN
Status: DISCONTINUED | OUTPATIENT
Start: 2024-04-18 | End: 2024-04-19 | Stop reason: HOSPADM

## 2024-04-18 RX ORDER — ACETAMINOPHEN 325 MG/1
650 TABLET ORAL ONCE
Status: COMPLETED | OUTPATIENT
Start: 2024-04-18 | End: 2024-04-18

## 2024-04-18 RX ORDER — TRANEXAMIC ACID 650 MG/1
1950 TABLET ORAL ONCE
Status: COMPLETED | OUTPATIENT
Start: 2024-04-19 | End: 2024-04-19

## 2024-04-18 RX ORDER — NALOXONE HYDROCHLORIDE 0.4 MG/ML
0.2 INJECTION, SOLUTION INTRAMUSCULAR; INTRAVENOUS; SUBCUTANEOUS EVERY 5 MIN PRN
Status: DISCONTINUED | OUTPATIENT
Start: 2024-04-18 | End: 2024-04-19 | Stop reason: HOSPADM

## 2024-04-18 RX ORDER — FERROUS SULFATE 325(65) MG
1 TABLET ORAL
Status: DISCONTINUED | OUTPATIENT
Start: 2024-04-19 | End: 2024-04-19 | Stop reason: HOSPADM

## 2024-04-18 RX ORDER — DIPHENHYDRAMINE HCL 12.5MG/5ML
12.5 LIQUID (ML) ORAL EVERY 6 HOURS PRN
Status: DISCONTINUED | OUTPATIENT
Start: 2024-04-18 | End: 2024-04-19 | Stop reason: HOSPADM

## 2024-04-18 RX ORDER — ASPIRIN 81 MG/1
81 TABLET ORAL 2 TIMES DAILY
Status: DISCONTINUED | OUTPATIENT
Start: 2024-04-18 | End: 2024-04-19 | Stop reason: HOSPADM

## 2024-04-18 RX ORDER — MIDAZOLAM HYDROCHLORIDE 1 MG/ML
1 INJECTION, SOLUTION INTRAMUSCULAR; INTRAVENOUS ONCE AS NEEDED
Status: DISCONTINUED | OUTPATIENT
Start: 2024-04-18 | End: 2024-04-18 | Stop reason: HOSPADM

## 2024-04-18 RX ORDER — SODIUM CHLORIDE, SODIUM LACTATE, POTASSIUM CHLORIDE, CALCIUM CHLORIDE 600; 310; 30; 20 MG/100ML; MG/100ML; MG/100ML; MG/100ML
100 INJECTION, SOLUTION INTRAVENOUS CONTINUOUS
Status: DISCONTINUED | OUTPATIENT
Start: 2024-04-18 | End: 2024-04-19 | Stop reason: HOSPADM

## 2024-04-18 RX ORDER — PROCHLORPERAZINE MALEATE 10 MG
10 TABLET ORAL EVERY 6 HOURS PRN
Status: DISCONTINUED | OUTPATIENT
Start: 2024-04-18 | End: 2024-04-19 | Stop reason: HOSPADM

## 2024-04-18 RX ORDER — PROPOFOL 10 MG/ML
INJECTION, EMULSION INTRAVENOUS CONTINUOUS PRN
Status: DISCONTINUED | OUTPATIENT
Start: 2024-04-18 | End: 2024-04-18

## 2024-04-18 RX ORDER — ATORVASTATIN CALCIUM 10 MG/1
10 TABLET, FILM COATED ORAL DAILY
Status: DISCONTINUED | OUTPATIENT
Start: 2024-04-18 | End: 2024-04-19 | Stop reason: HOSPADM

## 2024-04-18 RX ORDER — ACETAMINOPHEN 325 MG/1
975 TABLET ORAL ONCE
Status: DISCONTINUED | OUTPATIENT
Start: 2024-04-18 | End: 2024-04-18 | Stop reason: HOSPADM

## 2024-04-18 RX ORDER — HYDROMORPHONE HYDROCHLORIDE 1 MG/ML
INJECTION, SOLUTION INTRAMUSCULAR; INTRAVENOUS; SUBCUTANEOUS AS NEEDED
Status: DISCONTINUED | OUTPATIENT
Start: 2024-04-18 | End: 2024-04-18

## 2024-04-18 RX ORDER — HYDROCODONE BITARTRATE AND ACETAMINOPHEN 5; 325 MG/1; MG/1
1 TABLET ORAL EVERY 4 HOURS PRN
Status: DISCONTINUED | OUTPATIENT
Start: 2024-04-18 | End: 2024-04-18 | Stop reason: HOSPADM

## 2024-04-18 RX ORDER — ACETAMINOPHEN 325 MG/1
650 TABLET ORAL EVERY 4 HOURS PRN
Status: DISCONTINUED | OUTPATIENT
Start: 2024-04-18 | End: 2024-04-18 | Stop reason: HOSPADM

## 2024-04-18 RX ORDER — LIDOCAINE HYDROCHLORIDE 20 MG/ML
INJECTION, SOLUTION EPIDURAL; INFILTRATION; INTRACAUDAL; PERINEURAL AS NEEDED
Status: DISCONTINUED | OUTPATIENT
Start: 2024-04-18 | End: 2024-04-18

## 2024-04-18 RX ORDER — OMEPRAZOLE 40 MG/1
40 CAPSULE, DELAYED RELEASE ORAL 2 TIMES DAILY
Status: DISCONTINUED | OUTPATIENT
Start: 2024-04-18 | End: 2024-04-18

## 2024-04-18 RX ORDER — HYDROMORPHONE HYDROCHLORIDE 1 MG/ML
1 INJECTION, SOLUTION INTRAMUSCULAR; INTRAVENOUS; SUBCUTANEOUS EVERY 5 MIN PRN
Status: DISCONTINUED | OUTPATIENT
Start: 2024-04-18 | End: 2024-04-18 | Stop reason: HOSPADM

## 2024-04-18 RX ORDER — SODIUM CHLORIDE, SODIUM LACTATE, POTASSIUM CHLORIDE, CALCIUM CHLORIDE 600; 310; 30; 20 MG/100ML; MG/100ML; MG/100ML; MG/100ML
100 INJECTION, SOLUTION INTRAVENOUS CONTINUOUS
Status: DISCONTINUED | OUTPATIENT
Start: 2024-04-18 | End: 2024-04-18 | Stop reason: HOSPADM

## 2024-04-18 RX ORDER — BISACODYL 5 MG
10 TABLET, DELAYED RELEASE (ENTERIC COATED) ORAL DAILY PRN
Status: DISCONTINUED | OUTPATIENT
Start: 2024-04-18 | End: 2024-04-19 | Stop reason: HOSPADM

## 2024-04-18 RX ORDER — ONDANSETRON HYDROCHLORIDE 2 MG/ML
4 INJECTION, SOLUTION INTRAVENOUS EVERY 8 HOURS PRN
Status: DISCONTINUED | OUTPATIENT
Start: 2024-04-18 | End: 2024-04-19 | Stop reason: HOSPADM

## 2024-04-18 RX ORDER — ALBUTEROL SULFATE 0.83 MG/ML
2.5 SOLUTION RESPIRATORY (INHALATION) EVERY 6 HOURS PRN
Status: DISCONTINUED | OUTPATIENT
Start: 2024-04-18 | End: 2024-04-19 | Stop reason: HOSPADM

## 2024-04-18 RX ORDER — ONDANSETRON 4 MG/1
4 TABLET, ORALLY DISINTEGRATING ORAL EVERY 8 HOURS PRN
Status: DISCONTINUED | OUTPATIENT
Start: 2024-04-18 | End: 2024-04-19 | Stop reason: HOSPADM

## 2024-04-18 RX ORDER — ONDANSETRON HYDROCHLORIDE 2 MG/ML
4 INJECTION, SOLUTION INTRAVENOUS ONCE AS NEEDED
Status: DISCONTINUED | OUTPATIENT
Start: 2024-04-18 | End: 2024-04-18 | Stop reason: HOSPADM

## 2024-04-18 RX ORDER — ALBUTEROL SULFATE 0.83 MG/ML
2.5 SOLUTION RESPIRATORY (INHALATION) ONCE AS NEEDED
Status: DISCONTINUED | OUTPATIENT
Start: 2024-04-18 | End: 2024-04-18 | Stop reason: HOSPADM

## 2024-04-18 RX ORDER — SODIUM CHLORIDE, SODIUM LACTATE, POTASSIUM CHLORIDE, CALCIUM CHLORIDE 600; 310; 30; 20 MG/100ML; MG/100ML; MG/100ML; MG/100ML
50 INJECTION, SOLUTION INTRAVENOUS CONTINUOUS
Status: DISCONTINUED | OUTPATIENT
Start: 2024-04-18 | End: 2024-04-19 | Stop reason: HOSPADM

## 2024-04-18 RX ORDER — PROPOFOL 10 MG/ML
INJECTION, EMULSION INTRAVENOUS AS NEEDED
Status: DISCONTINUED | OUTPATIENT
Start: 2024-04-18 | End: 2024-04-18

## 2024-04-18 RX ORDER — PROCHLORPERAZINE 25 MG/1
25 SUPPOSITORY RECTAL EVERY 12 HOURS PRN
Status: DISCONTINUED | OUTPATIENT
Start: 2024-04-18 | End: 2024-04-19 | Stop reason: HOSPADM

## 2024-04-18 RX ORDER — CEFAZOLIN SODIUM 2 G/100ML
2 INJECTION, SOLUTION INTRAVENOUS EVERY 8 HOURS
Qty: 200 ML | Refills: 0 | Status: COMPLETED | OUTPATIENT
Start: 2024-04-18 | End: 2024-04-19

## 2024-04-18 RX ORDER — ACETAMINOPHEN 325 MG/1
650 TABLET ORAL EVERY 6 HOURS SCHEDULED
Status: DISCONTINUED | OUTPATIENT
Start: 2024-04-18 | End: 2024-04-19 | Stop reason: HOSPADM

## 2024-04-18 RX ORDER — ROPIVACAINE/EPI/CLONIDINE/KET 2.46-0.005
SYRINGE (ML) INJECTION AS NEEDED
Status: DISCONTINUED | OUTPATIENT
Start: 2024-04-18 | End: 2024-04-18 | Stop reason: HOSPADM

## 2024-04-18 RX ORDER — ROCURONIUM BROMIDE 10 MG/ML
INJECTION, SOLUTION INTRAVENOUS AS NEEDED
Status: DISCONTINUED | OUTPATIENT
Start: 2024-04-18 | End: 2024-04-18

## 2024-04-18 RX ORDER — TRANEXAMIC ACID 650 MG/1
1950 TABLET ORAL ONCE
Qty: 3 TABLET | Refills: 0 | Status: COMPLETED | OUTPATIENT
Start: 2024-04-18 | End: 2024-04-18

## 2024-04-18 RX ORDER — SODIUM CHLORIDE 0.9 G/100ML
IRRIGANT IRRIGATION AS NEEDED
Status: DISCONTINUED | OUTPATIENT
Start: 2024-04-18 | End: 2024-04-18 | Stop reason: HOSPADM

## 2024-04-18 RX ORDER — PANTOPRAZOLE SODIUM 40 MG/1
40 TABLET, DELAYED RELEASE ORAL
Status: DISCONTINUED | OUTPATIENT
Start: 2024-04-18 | End: 2024-04-19 | Stop reason: HOSPADM

## 2024-04-18 RX ORDER — MIDAZOLAM HYDROCHLORIDE 1 MG/ML
INJECTION, SOLUTION INTRAMUSCULAR; INTRAVENOUS AS NEEDED
Status: DISCONTINUED | OUTPATIENT
Start: 2024-04-18 | End: 2024-04-18

## 2024-04-18 RX ORDER — OXYCODONE HYDROCHLORIDE 10 MG/1
10 TABLET ORAL EVERY 4 HOURS PRN
Status: DISCONTINUED | OUTPATIENT
Start: 2024-04-18 | End: 2024-04-18 | Stop reason: HOSPADM

## 2024-04-18 RX ORDER — CYCLOBENZAPRINE HCL 10 MG
10 TABLET ORAL 3 TIMES DAILY PRN
Status: DISCONTINUED | OUTPATIENT
Start: 2024-04-18 | End: 2024-04-19 | Stop reason: HOSPADM

## 2024-04-18 RX ORDER — DOCUSATE SODIUM 100 MG/1
100 CAPSULE, LIQUID FILLED ORAL 2 TIMES DAILY
Status: DISCONTINUED | OUTPATIENT
Start: 2024-04-18 | End: 2024-04-19 | Stop reason: HOSPADM

## 2024-04-18 RX ORDER — CELECOXIB 200 MG/1
200 CAPSULE ORAL ONCE
Status: DISCONTINUED | OUTPATIENT
Start: 2024-04-18 | End: 2024-04-18 | Stop reason: HOSPADM

## 2024-04-18 RX ADMIN — CEFAZOLIN SODIUM 2 G: 2 INJECTION, SOLUTION INTRAVENOUS at 17:21

## 2024-04-18 RX ADMIN — ROCURONIUM BROMIDE 50 MG: 10 INJECTION INTRAVENOUS at 09:27

## 2024-04-18 RX ADMIN — OXYCODONE HYDROCHLORIDE 10 MG: 10 TABLET ORAL at 22:53

## 2024-04-18 RX ADMIN — LIDOCAINE HYDROCHLORIDE 60 MG: 20 INJECTION, SOLUTION EPIDURAL; INFILTRATION; INTRACAUDAL; PERINEURAL at 09:26

## 2024-04-18 RX ADMIN — PROPOFOL 200 MG: 10 INJECTION, EMULSION INTRAVENOUS at 09:26

## 2024-04-18 RX ADMIN — OXYCODONE HYDROCHLORIDE 10 MG: 10 TABLET ORAL at 13:14

## 2024-04-18 RX ADMIN — TRANEXAMIC ACID 1950 MG: 650 TABLET ORAL at 17:21

## 2024-04-18 RX ADMIN — MIDAZOLAM 2 MG: 1 INJECTION INTRAMUSCULAR; INTRAVENOUS at 09:15

## 2024-04-18 RX ADMIN — HYDROMORPHONE HYDROCHLORIDE 0.5 MG: 1 INJECTION, SOLUTION INTRAMUSCULAR; INTRAVENOUS; SUBCUTANEOUS at 11:54

## 2024-04-18 RX ADMIN — ASPIRIN 81 MG: 81 TABLET, COATED ORAL at 20:46

## 2024-04-18 RX ADMIN — DEXAMETHASONE SODIUM PHOSPHATE 4 MG: 4 INJECTION INTRA-ARTICULAR; INTRALESIONAL; INTRAMUSCULAR; INTRAVENOUS; SOFT TISSUE at 09:40

## 2024-04-18 RX ADMIN — SODIUM CHLORIDE, SODIUM LACTATE, POTASSIUM CHLORIDE, AND CALCIUM CHLORIDE: 600; 310; 30; 20 INJECTION, SOLUTION INTRAVENOUS at 10:16

## 2024-04-18 RX ADMIN — TRANEXAMIC ACID 1950 MG: 650 TABLET ORAL at 08:00

## 2024-04-18 RX ADMIN — ATORVASTATIN CALCIUM 10 MG: 10 TABLET, FILM COATED ORAL at 13:14

## 2024-04-18 RX ADMIN — HYDROMORPHONE HYDROCHLORIDE 0.5 MG: 1 INJECTION, SOLUTION INTRAMUSCULAR; INTRAVENOUS; SUBCUTANEOUS at 10:39

## 2024-04-18 RX ADMIN — ESCITALOPRAM OXALATE 20 MG: 20 TABLET ORAL at 13:14

## 2024-04-18 RX ADMIN — ACETAMINOPHEN 650 MG: 325 TABLET ORAL at 13:13

## 2024-04-18 RX ADMIN — FENTANYL CITRATE 50 MCG: 50 INJECTION, SOLUTION INTRAMUSCULAR; INTRAVENOUS at 09:53

## 2024-04-18 RX ADMIN — ACETAMINOPHEN 650 MG: 325 TABLET ORAL at 08:00

## 2024-04-18 RX ADMIN — HYDROMORPHONE HYDROCHLORIDE 0.5 MG: 1 INJECTION, SOLUTION INTRAMUSCULAR; INTRAVENOUS; SUBCUTANEOUS at 20:46

## 2024-04-18 RX ADMIN — HYDROMORPHONE HYDROCHLORIDE 0.5 MG: 1 INJECTION, SOLUTION INTRAMUSCULAR; INTRAVENOUS; SUBCUTANEOUS at 10:58

## 2024-04-18 RX ADMIN — DOCUSATE SODIUM 100 MG: 100 CAPSULE, LIQUID FILLED ORAL at 13:13

## 2024-04-18 RX ADMIN — ONDANSETRON 4 MG: 2 INJECTION, SOLUTION INTRAMUSCULAR; INTRAVENOUS at 09:40

## 2024-04-18 RX ADMIN — SODIUM CHLORIDE, POTASSIUM CHLORIDE, SODIUM LACTATE AND CALCIUM CHLORIDE 50 ML/HR: 600; 310; 30; 20 INJECTION, SOLUTION INTRAVENOUS at 13:15

## 2024-04-18 RX ADMIN — SODIUM CHLORIDE, SODIUM LACTATE, POTASSIUM CHLORIDE, AND CALCIUM CHLORIDE: 600; 310; 30; 20 INJECTION, SOLUTION INTRAVENOUS at 09:10

## 2024-04-18 RX ADMIN — ACETAMINOPHEN 650 MG: 325 TABLET ORAL at 20:46

## 2024-04-18 RX ADMIN — PANTOPRAZOLE SODIUM 40 MG: 40 TABLET, DELAYED RELEASE ORAL at 17:21

## 2024-04-18 RX ADMIN — OXYCODONE HYDROCHLORIDE 5 MG: 5 TABLET ORAL at 17:21

## 2024-04-18 RX ADMIN — FENTANYL CITRATE 50 MCG: 50 INJECTION, SOLUTION INTRAMUSCULAR; INTRAVENOUS at 09:26

## 2024-04-18 RX ADMIN — CEFAZOLIN SODIUM 2 G: 2 INJECTION, SOLUTION INTRAVENOUS at 09:25

## 2024-04-18 RX ADMIN — ASPIRIN 81 MG: 81 TABLET, COATED ORAL at 13:14

## 2024-04-18 RX ADMIN — Medication 3 L/MIN: at 11:00

## 2024-04-18 RX ADMIN — PROPOFOL 300 MG: 10 INJECTION, EMULSION INTRAVENOUS at 09:35

## 2024-04-18 RX ADMIN — DOCUSATE SODIUM 100 MG: 100 CAPSULE, LIQUID FILLED ORAL at 20:46

## 2024-04-18 SDOH — SOCIAL STABILITY: SOCIAL INSECURITY: DO YOU FEEL ANYONE HAS EXPLOITED OR TAKEN ADVANTAGE OF YOU FINANCIALLY OR OF YOUR PERSONAL PROPERTY?: NO

## 2024-04-18 SDOH — SOCIAL STABILITY: SOCIAL INSECURITY: WERE YOU ABLE TO COMPLETE ALL THE BEHAVIORAL HEALTH SCREENINGS?: YES

## 2024-04-18 SDOH — SOCIAL STABILITY: SOCIAL INSECURITY: HAVE YOU HAD THOUGHTS OF HARMING ANYONE ELSE?: NO

## 2024-04-18 SDOH — SOCIAL STABILITY: SOCIAL INSECURITY: DOES ANYONE TRY TO KEEP YOU FROM HAVING/CONTACTING OTHER FRIENDS OR DOING THINGS OUTSIDE YOUR HOME?: NO

## 2024-04-18 SDOH — SOCIAL STABILITY: SOCIAL INSECURITY: HAS ANYONE EVER THREATENED TO HURT YOUR FAMILY OR YOUR PETS?: NO

## 2024-04-18 SDOH — SOCIAL STABILITY: SOCIAL INSECURITY: DO YOU FEEL UNSAFE GOING BACK TO THE PLACE WHERE YOU ARE LIVING?: NO

## 2024-04-18 SDOH — SOCIAL STABILITY: SOCIAL INSECURITY: ARE THERE ANY APPARENT SIGNS OF INJURIES/BEHAVIORS THAT COULD BE RELATED TO ABUSE/NEGLECT?: NO

## 2024-04-18 SDOH — HEALTH STABILITY: MENTAL HEALTH: CURRENT SMOKER: 0

## 2024-04-18 SDOH — SOCIAL STABILITY: SOCIAL INSECURITY: HAVE YOU HAD ANY THOUGHTS OF HARMING ANYONE ELSE?: NO

## 2024-04-18 SDOH — SOCIAL STABILITY: SOCIAL INSECURITY: ARE YOU OR HAVE YOU BEEN THREATENED OR ABUSED PHYSICALLY, EMOTIONALLY, OR SEXUALLY BY ANYONE?: NO

## 2024-04-18 SDOH — SOCIAL STABILITY: SOCIAL INSECURITY: ABUSE: ADULT

## 2024-04-18 ASSESSMENT — PATIENT HEALTH QUESTIONNAIRE - PHQ9
SUM OF ALL RESPONSES TO PHQ9 QUESTIONS 1 & 2: 0
1. LITTLE INTEREST OR PLEASURE IN DOING THINGS: NOT AT ALL
2. FEELING DOWN, DEPRESSED OR HOPELESS: NOT AT ALL

## 2024-04-18 ASSESSMENT — COGNITIVE AND FUNCTIONAL STATUS - GENERAL
STANDING UP FROM CHAIR USING ARMS: A LITTLE
MOVING TO AND FROM BED TO CHAIR: A LITTLE
TURNING FROM BACK TO SIDE WHILE IN FLAT BAD: A LITTLE
WALKING IN HOSPITAL ROOM: A LITTLE
MOVING TO AND FROM BED TO CHAIR: A LITTLE
PATIENT BASELINE BEDBOUND: NO
DAILY ACTIVITIY SCORE: 24
DAILY ACTIVITIY SCORE: 23
MOBILITY SCORE: 24
CLIMB 3 TO 5 STEPS WITH RAILING: A LITTLE
MOBILITY SCORE: 18
TURNING FROM BACK TO SIDE WHILE IN FLAT BAD: A LITTLE
STANDING UP FROM CHAIR USING ARMS: A LITTLE
WALKING IN HOSPITAL ROOM: A LITTLE
CLIMB 3 TO 5 STEPS WITH RAILING: A LITTLE
TOILETING: A LITTLE
MOVING FROM LYING ON BACK TO SITTING ON SIDE OF FLAT BED WITH BEDRAILS: A LITTLE
MOBILITY SCORE: 19

## 2024-04-18 ASSESSMENT — ACTIVITIES OF DAILY LIVING (ADL)
TOILETING: INDEPENDENT
FEEDING YOURSELF: INDEPENDENT
ADL_ASSISTANCE: INDEPENDENT
JUDGMENT_ADEQUATE_SAFELY_COMPLETE_DAILY_ACTIVITIES: YES
BATHING: INDEPENDENT
DRESSING YOURSELF: INDEPENDENT
HEARING - RIGHT EAR: FUNCTIONAL
ADEQUATE_TO_COMPLETE_ADL: YES
GROOMING: INDEPENDENT
LACK_OF_TRANSPORTATION: PATIENT DECLINED
WALKS IN HOME: INDEPENDENT
PATIENT'S MEMORY ADEQUATE TO SAFELY COMPLETE DAILY ACTIVITIES?: YES
HEARING - LEFT EAR: FUNCTIONAL

## 2024-04-18 ASSESSMENT — COLUMBIA-SUICIDE SEVERITY RATING SCALE - C-SSRS
2. HAVE YOU ACTUALLY HAD ANY THOUGHTS OF KILLING YOURSELF?: NO
1. IN THE PAST MONTH, HAVE YOU WISHED YOU WERE DEAD OR WISHED YOU COULD GO TO SLEEP AND NOT WAKE UP?: NO
6. HAVE YOU EVER DONE ANYTHING, STARTED TO DO ANYTHING, OR PREPARED TO DO ANYTHING TO END YOUR LIFE?: NO

## 2024-04-18 ASSESSMENT — LIFESTYLE VARIABLES
AUDIT-C TOTAL SCORE: 2
HOW MANY STANDARD DRINKS CONTAINING ALCOHOL DO YOU HAVE ON A TYPICAL DAY: 1 OR 2
SKIP TO QUESTIONS 9-10: 1
HOW OFTEN DO YOU HAVE A DRINK CONTAINING ALCOHOL: 2-4 TIMES A MONTH
HOW OFTEN DO YOU HAVE 6 OR MORE DRINKS ON ONE OCCASION: NEVER
AUDIT-C TOTAL SCORE: 2

## 2024-04-18 ASSESSMENT — PAIN SCALES - GENERAL
PAINLEVEL_OUTOF10: 0 - NO PAIN
PAINLEVEL_OUTOF10: 7
PAINLEVEL_OUTOF10: 2
PAINLEVEL_OUTOF10: 9
PAINLEVEL_OUTOF10: 3
PAINLEVEL_OUTOF10: 0 - NO PAIN
PAINLEVEL_OUTOF10: 5 - MODERATE PAIN
PAINLEVEL_OUTOF10: 3
PAINLEVEL_OUTOF10: 5 - MODERATE PAIN
PAINLEVEL_OUTOF10: 0 - NO PAIN
PAINLEVEL_OUTOF10: 1
PAINLEVEL_OUTOF10: 9
PAINLEVEL_OUTOF10: 0 - NO PAIN
PAIN_LEVEL: 0

## 2024-04-18 NOTE — ANESTHESIA POSTPROCEDURE EVALUATION
Patient: Yamilex Ye    Procedure Summary       Date: 04/18/24 Room / Location: [facility] OR  / [facility] OR    Anesthesia Start: 0910 Anesthesia Stop:     Procedure: Total Knee Arthroplasty (Right: Knee) Diagnosis:       Unilateral primary osteoarthritis, right knee      (Unilateral primary osteoarthritis, right knee [M17.11])    Surgeons: Chivo Newman MD Responsible Provider: Opal Lozano MD    Anesthesia Type: general, regional ASA Status: 3            Anesthesia Type: general, regional    Vitals Value Taken Time   /81 04/18/24 1055   Temp 36 04/18/24 1059   Pulse 88 04/18/24 1057   Resp 26 04/18/24 1057   SpO2 92 % 04/18/24 1057   Vitals shown include unfiled device data.    Anesthesia Post Evaluation    Patient location during evaluation: PACU  Patient participation: complete - patient participated  Level of consciousness: awake and alert  Pain score: 0  Pain management: adequate  Airway patency: patent  Cardiovascular status: acceptable  Respiratory status: acceptable  Hydration status: acceptable  Postoperative Nausea and Vomiting: none        There were no known notable events for this encounter.

## 2024-04-18 NOTE — ANESTHESIA PROCEDURE NOTES
Peripheral Block    Patient location during procedure: pre-op  Start time: 4/18/2024 9:05 AM  End time: 4/18/2024 9:10 AM  Reason for block: at surgeon's request and post-op pain management  Staffing  Performed: attending   Authorized by: Opal Lozano MD    Performed by: Opal Lozano MD  Preanesthetic Checklist  Completed: patient identified, IV checked, site marked, risks and benefits discussed, surgical consent, monitors and equipment checked, pre-op evaluation and timeout performed   Timeout performed at: 4/18/2024 9:00 AM  Peripheral Block  Patient position: laying flat  Prep: ChloraPrep  Patient monitoring: heart rate, cardiac monitor and continuous pulse ox  Block type: adductor canal  Laterality: right  Injection technique: single-shot  Guidance: ultrasound guided  Local infiltration: lidocaine  Infiltration strength: 2 %  Dose: 5 mL  Needle  Needle type: pencil-tip   Needle gauge: 21 G  Needle length: 10 cm  Needle localization: ultrasound guidance and anatomical landmarks  Needle insertion depth: 7 cm  Test dose: negative  Assessment  Injection assessment: negative aspiration for heme, no paresthesia on injection, incremental injection and local visualized surrounding nerve on ultrasound  Paresthesia pain: none  Heart rate change: no  Slow fractionated injection: yes  Additional Notes  30 ml of 0.5% Bupivacaine with Epinephrine 1:200 000 mixed with 100 mcg of Clonidine and 10 mg of Decadron. Slow, incremental injection of the mix: 28 ml into the Adductor Canal, the remaining 4 ml used for the Anterior femoral Cutaneous Nerve Field Block with consistently negative aspirations between bouts of injections. Patient tolerated procedure well without complications.

## 2024-04-18 NOTE — DISCHARGE INSTRUCTIONS
"    Knee Replacement Discharge Instructions:   Dr. Chivo Newman    Physical Therapy / Range of Motion:    Once you are discharged from the hospital, whether you go home or to a rehabilitation facility, you should have therapy. The minimum for a knee replacement is two-three times per week.  If you find that either at home or in a rehabilitation facility, you are not receiving the appropriate amount of therapy, please call our office immediately. Therapy CANNOT be postponed or delayed!  Therapy should be done by patient on days without appointments.    Knee replacements should ideally achieve 90 degrees of flexion by 2 weeks from surgery.  Ask your therapist after each session \"What is my RANGE OF MOTION?\"  Your physician will be asking you this question at your first post-operative appointment, and each following appointment.  It is also important to work on getting the knee straight and at rest attempt to keep the knee as straight as possible.    Discharge to rehab:  If you go to a rehabilitation facility, discharge to home is determined by the specific staff at the facility when they deem you are safe to return home.  Your surgeon and his staff are not allowed to make this decision. Please inquire with the facility therapist, , and medical personnel.      Discharge to home:  Eventually, therapy will progress to an outpatient setting (ideally around 2 weeks from surgery), where you physically go to a therapy facility, either here at [facility] or somewhere close to your home. This will typically continue for at least six weeks following your surgery.  In certain cases, this may be longer depending on your progress.     Medications:  You have been prescribed medication to prevent blood clots, please follow the instructions and dosage information you were given. Please wear the TERRY hose stockings that you were given to help prevent blood clots for two weeks postoperatively, and do your " exercises after surgery as these will help reduce your risk of blood clots.     Prescriptions will be given to you upon discharge for pain medicine as well as blood thinning medication. Please remember to take your pain medication as directed. It is very important that you take your short-acting pain medication one hour before scheduled physical therapy. This will allow you to participate aggressively and achieve the necessary goals.     -Every effort must be made to prevent an infection in your artificial joint.  EVERY dentist or doctor that works with you should know that you have an artificial joint.  Antibiotics MUST be used before and after ANY dental procedure.    If you need a refill, please notify the office at LEAST 48 hours before you will be out of your medication. Some pain medications cannot be called in to a pharmacy and have to be printed on paper and picked up at the office. Any refills on pain medication need to be requested during clinic office hours, 8-5 on Mon-Fri.  We cannot refill pain medications on the weekend.    Wound Care:  Leave waterproof dressing in place.  As long as the Aquacel dressing is dry, intact, and occlusive at the borders, you may shower as the dressing is waterproof.     You may reinforce with other dressing materials as needed (gauze, ACE wraps, etc.) if drainage is noted, then please contact us.    May remove  the dressing at 7 days post-op.  If you are discharged to a rehab facility, the nursing staff may remove your Aquacel dressing at 7 days post-op.     You may shower normally, allowing water to run over the incision site, at ~14 days.  DO NOT RUB OR SCRUB INCISION. NO SOAKING IN A TUB OR STANDING WATER UNTIL INCISION IS WELL-HEALED AND SCABS HAVE DISAPPEARED.    -Do not apply any lotions, creams, ointments, peroxide, betadine or gels to incision and surrounding area.          -Apply ice to affected area as tolerated.      Driving:  Must be off of narcotic pain  medication and have good leg control! Approximately right le-6 weeks and left le weeks.  Please discuss with Dr. Newman at first post-op visit prior to resuming.     Follow-up Appointments:   Please call your surgeon's office if you are unsure about your post-op appointments. Your first post-operative appointment is made prior to surgery.     Prior, during, and after your surgery and hospital stay, please do not hesitate to call our office with any questions or concerns.    Our staff will be happy to assist you in any possible way during your personal journey through joint replacement.

## 2024-04-18 NOTE — OP NOTE
Operative Note     Date: 2024  OR Location: [facility] OR    Name: Yamilex Ye, : 1950, Age: 73 y.o., MRN: [id number], Sex: female    Diagnosis  Pre-op Diagnosis     * Unilateral primary osteoarthritis, right knee [M17.11] Post-op Diagnosis     * Unilateral primary osteoarthritis, right knee [M17.11]     Procedures  Total Knee Arthroplasty  09826 - [place] ARTHRP KNE CONDYLE&PLATU MEDIAL&LAT COMPARTMENTS  (RIGHT)    Surgeons      * Chivo Newman - Primary    Resident/Fellow/Other Assistant:  Jason Reinoso PA-C     Procedure Summary  Anesthesia: Spinal  ASA: ASA status not filed in the log.  Anesthesia Staff: Anesthesiologist: Opal Lozano MD  C-AA: DENEEN Rey  Estimated Blood Loss: 50 mL  Intra-op Medications:   Administrations occurring from 0915 to 1100 on 24:   Medication Name Total Dose   ropivacaine-epinephrine-clonidine-ketorolac 2.46-0.005- 0.0008-0.3mg/mL periarticular syringe 100 mL   ceFAZolin in dextrose (iso-os) (Ancef) IVPB 2 g 2 g              Anesthesia Record               Intraprocedure I/O Totals          Intake    LR bolus 500.00 mL    Total Intake 500 mL          Specimen: No specimens collected     Staff:   Circulator: Kailey Hull RN; Talha Medley Jr., RN  Scrub Person: Jacinda Urias           Implants:   Doris triathlon size 4 CR femur size 4 tibia, 13 CS articular surface 29 asymmetric patella  Implants              Findings: see procedure details    Indications:     The patient was seen in the preoperative area. The risks, benefits, complications, treatment options, non-operative alternatives, expected recovery and outcomes were discussed with the patient. The possibilities of reaction to medication, pulmonary aspiration, injury to surrounding structures, bleeding, recurrent infection, the need for additional procedures, failure to diagnose a condition, and creating a complication requiring transfusion or operation were discussed  with the patient. The patient concurred with the proposed plan, giving informed consent.  The site of surgery was properly noted/marked if necessary per policy. The patient has been actively warmed in preoperative area. Preoperative antibiotics have been ordered and given within 1 hours of incision. Venous thrombosis prophylaxis have been ordered.    The patient failed multiple attempts at non-surgical management.  Specific to TKA we discussed the risks of infection / revision surgery, DVT/PE, medical complications, stiffness / loss of motion, neurologic (foot drop) or vascular injury.    Procedure Details:   Patient was met prior to surgery in pre-operative holding.  The appropriate extremity was marked and recent health status was reviewed and we found no contraindication to proceeding with the scheduled procedure.  We again reviewed the risks of infection, wound issues, deep venous thrombosis, pulmonary embolism, medical complications including cardiac and pulmonary, neurologic and vascular injury and incomplete relief of pre-operative pain.    The patient discussed regional anesthesia in pre-op holding.  The patient was transported to the operating room.  A thigh tourniquet was placed and a small bump on the buttock.  The patient was resting comfortably in a supine position with all yuan prominences well padded.  Sequential compression device was placed on the contralateral lower extremity.  An exam under anesthesia was performed to evaluate the patient´s range of motion and ligamentous integrity.    The patient was prepped and draped in the usual sterile fashion.  The leg was placed in a well padded leg naylor.  After prep, drape, antibiotic and time out, the leg was exsanguinated and the tourniquet inflated.  A longitudinal incision was made over the anterior knee.  The dissection was carried out through the skin and subcutaneous tissue.  A medial parapatellar arthrotomy was performed.  An effusion and  synovitis was noted compatible with the grade IV changes of the articular cartilage.  The fat pad was slightly de-bulked, Morrison´s line was marked and some of the deep medial collateral ligament was released from the tibia.  The ACL was resected. The knee was flexed up and medial and lateral retractors were placed to protect the collateral ligaments and popliteus tendon.      Due to the degree of deformity and stiffness, the posterior cruciate ligament was resected.    A canal finder reamer was utilized to gain entry into the femur.  An intramedullary alec was placed by hand and set to 5 degrees of valgus for the distal femoral cut.  The cutting block was placed and the distal femoral cut was performed.   A sizing guide was then placed and the femoral size was measured based on posterior referencing.  The 4-in-1 cutting block was placed set to 3 degrees of external rotation.  The rotation was confirmed based on the transepicondylar axis and Lisa´s line.  There was no significant hypoplasia of the posterior condyles.     Once the cutting block was placed the cuts were performed: anterior, posterior and chamfer cuts.  There collaterals were protected and the bone was removed.  There was no notching of the femur.   Once the femoral cuts were complete, we turned our attention to the tibia.  Retractors were placed medial, lateral and posteriorly.  An extramedullary alignment alec was used and the slope was set in accordance with the implant.  We measured 2 mm of resection from the lowest point on the tibia.  The tibial cut was completed with care not to encroach posterior to the knee joint.    After the tibial cut was completed, we removed the remaining menisci.  Using a curved osteotome posterior osteophytes were carefully removed from the femur.  We then assessed the flexion and extension gaps using trial spacer blocks.    The gaps appeared symmetric with a spacer block and we proceeded to placing trial implants.   With symmetric gaps we progressed to placing trial implants.The final poly was a 13 CS.    A trial tibial component was placed with care to avoid overhang.  The rotation was set in line with the medial third of the tibial tubercle.  A trial femoral component was then placed, followed by a trial articular surface.  The knee was taken through range of motion with the provisional components.  The flexion and extension gaps were evaluated, as well as the patellar tracking and mid-flexion stability.  The following soft tissue balancing, recuts, and/or modifications were required: none..      The patella was everted and ~9 mm of bone were removed from the thickest portion using a clamp/cutting guide.  The lug holes were drilled in the patella and femur.  The tibia was prepared with the drill and broach after confirmation of alignment using a drop alec.   The sclerotic areas of the bone were drilled using a small drill bit.  The capsule around the knee was injected using a long acting local anesthetic / multimodal pain mixture.    The cement was mixed in a vacuum sealed fashion while the bone was pulsatile lavaged.   The bone was dried and the implants were placed with a gentle posterior directed force and a clamp on the patella.  The excess cement was removed.   After the cement dried the gap balancing was reassessed prior to placing the final articular surface.  The proud cement mantle was removed using a small osteotome.  The knee was then copiously lavaged with sterile saline and Irrisept (0.05% chlorhexidine gluconate).  The tourniquet was taken down and hemostasis was obtained using Bovie electrocautery and tranexamic acid (per protocol).  No significant bleeders were encountered and the usage of a drain was not felt to be necessary.    A layered closure was performed using 1 Vicryl and 1 Stratafix in the retinacular layer and quadriceps tendon.  2-0 vicryl in the deep dermal layer and monofilament in the skin.  An  adherent, water proof dressing was placed followed by Webril and an ace bandage. All counts were reported as correct.  The patient was stable to the post anesthesia care unit.    I was present and participated in the entire procedure. The Physician Assistant participated in all critical elements of the procedure under my direct supervision. The surgical incision was closed by the PA under my indirect supervision. There were no qualified residents available to assist.    The physician assistant was present for the entire case.  Given the nature of the procedure and disease process, a skilled surgical assistant was necessary for the case.  The assistant was necessary for retraction and helped directly facilitate completion of the surgery.  A certified scrub tech was at the back table managing instruments and supplies for the surgical procedure.    Complications:  None; patient tolerated the procedure well.    Disposition: PACU - hemodynamically stable.  Condition: stable         Chivo Newman  Phone Number: [phone]

## 2024-04-18 NOTE — ANESTHESIA PROCEDURE NOTES
Airway  Date/Time: 4/18/2024 9:31 AM  Urgency: elective    Airway not difficult    Staffing  Performed: DENEEN   Authorized by: Opal Lozano MD    Performed by: DENEEN Rey  Patient location during procedure: OR    Indications and Patient Condition  Indications for airway management: anesthesia  Spontaneous Ventilation: absent  Sedation level: deep  Preoxygenated: yes  Patient position: sniffing  MILS maintained throughout  Mask difficulty assessment: 1 - vent by mask  Planned trial extubation    Final Airway Details  Final airway type: endotracheal airway      Successful airway: ETT  Cuffed: yes   Successful intubation technique: direct laryngoscopy  Facilitating devices/methods: intubating stylet  Endotracheal tube insertion site: oral  Blade: Diego  Blade size: #3  ETT size (mm): 7.0  Cormack-Lehane Classification: grade I - full view of glottis  Placement verified by: chest auscultation   Measured from: lips  ETT to lips (cm): 23  Number of attempts at approach: 1  Ventilation between attempts: supraglottic airway

## 2024-04-18 NOTE — ANESTHESIA PREPROCEDURE EVALUATION
Patient: Yamilex Ye    Procedure Information       Anesthesia Start Date/Time: 04/18/24 0910    Procedure: Total Knee Arthroplasty (Right: Knee) - BLOCK PER TKA PROTOCOL    Location: [facility] OR  / [facility]    Surgeons: Chivo Newman MD            Relevant Problems   Cardiac   (+) Benign essential hypertension   (+) Dyslipidemia, goal LDL below 100   (+) Hypertension      Neuro   (+) Situational anxiety   (+) Situational depression      GI   (+) Esophageal reflux   (+) Mixed irritable bowel syndrome      /Renal   (+) Hyponatremia      Liver   (+) Cholelithiasis   (+) Fatty infiltration of liver      Endocrine   (+) Class 2 severe obesity with serious comorbidity and body mass index (BMI) of 35.0 to 35.9 in adult (Multi)   (+) Hypothyroidism      Hematology   (+) Mild anemia      Musculoskeletal   (+) Localized osteoarthritis of hip   (+) Osteoarthritis, localized, knee   (+) Unilateral primary osteoarthritis, right knee      HEENT   (+) Hearing loss   (+) Sensorineural hearing loss (SNHL) of both ears      Skin   (+) Basal cell carcinoma   (+) Eczema       Clinical information reviewed:   Tobacco  Allergies  Meds  Problems  Med Hx  Surg Hx  OB Status    Fam Hx  Soc Hx        NPO Detail:  NPO/Void Status  Carbohydrate Drink Given Prior to Surgery? : N  Date of Last Liquid: 04/18/24  Time of Last Liquid: 0600  Date of Last Solid: 04/17/24  Time of Last Solid: 1800  Last Intake Type: Clear fluids  Time of Last Void: 0600         Physical Exam    Airway  Mallampati: II  TM distance: >3 FB  Neck ROM: full     Cardiovascular - normal exam  Rhythm: regular  Rate: normal     Dental - normal exam     Pulmonary - normal exam  Breath sounds clear to auscultation     Abdominal   (+) obese  Abdomen: soft  Bowel sounds: normal           Anesthesia Plan    History of general anesthesia?: yes  History of complications of general anesthesia?: no    ASA 3     general and regional   (Adductor Canal Block)  The  patient is not a current smoker.  Patient was previously instructed to abstain from smoking on day of procedure.  Patient did not smoke on day of procedure.  Education provided regarding risk of obstructive sleep apnea.  intravenous induction   Postoperative administration of opioids is intended.  Anesthetic plan and risks discussed with patient.  Use of blood products discussed with patient who.    Plan discussed with CAA.

## 2024-04-18 NOTE — PROGRESS NOTES
Physical Therapy    Physical Therapy Evaluation and Treatment    Patient Name: Yamilex Ye  MRN: [id number]  Today's Date: 4/18/2024   Time Calculation  Start Time: 1423  Stop Time: 1449  Time Calculation (min): 26 min    Assessment/Plan   PT Assessment  PT Assessment Results: Decreased strength, Decreased range of motion, Decreased endurance, Impaired balance, Decreased mobility, Pain, Orthopedic restrictions  Rehab Prognosis: Good  Evaluation/Treatment Tolerance:  (limited by drowsiness)  Medical Staff Made Aware: Yes  End of Session Communication: Bedside nurse  Assessment Comment: Pt presents today below baseline level of function and requires continued PT during hospital stay. Pt requires PT at a low intensity level at discharge to maximize functional mobility and safety.    End of Session Patient Position: Up in chair, Alarm on  IP OR SWING BED PT PLAN  Inpatient or Swing Bed: Inpatient  PT Plan  Treatment/Interventions: Bed mobility, Transfer training, Gait training, Balance training, Neuromuscular re-education, Strengthening, Endurance training, Range of motion, Therapeutic exercise, Therapeutic activity, Home exercise program  PT Plan: Skilled PT  PT Frequency: BID  PT Discharge Recommendations: Low intensity level of continued care  Equipment Recommended upon Discharge: Wheeled walker  PT Recommended Transfer Status: Assist x1  PT - OK to Discharge: Yes (To next level of care when cleared by medical team   )    Subjective       General Visit Information:  General  Reason for Referral: TKA  Referred By: Yimi Newman MD  Past Medical History Relevant to Rehab: Pt admitted 4/18/24 following completion of right TKA. PMH: mild anemia, hearing loss, HTN  Family/Caregiver Present: Yes  Prior to Session Communication: Bedside nurse  Patient Position Received: Alarm on, Up in chair  Preferred Learning Style: verbal  General Comment: Pt agreeable to PT, nursing cleared for treatment.    Home Living:  Home  Living  Type of Home: House  Lives With: Alone  Home Adaptive Equipment: Walker rolling or standard, Cane  Home Layout: One level  Home Access: Stairs to enter with rails  Entrance Stairs-Number of Steps: one  Bathroom Shower/Tub: Walk-in shower  Bathroom Equipment: Shower chair with back    Prior Level of Function:  Prior Function Per Pt/Caregiver Report  ADL Assistance: Independent  Homemaking Assistance: Independent (has hired assist for cleaning)  Ambulatory Assistance:  (mod I with cane)  Prior Function Comments: denies any falls in the past 6 months    Precautions:  Precautions  LE Weight Bearing Status: Weight Bearing as Tolerated (right LE)  Medical Precautions: Fall precautions  Post-Surgical Precautions: Right total knee precautions    Vital Signs:  Vital Signs  SpO2:  (88% on RA following ambulation, recovered quickly to 94% RA with seated rest break)  Objective     Pain:  Pain Assessment  Pain Assessment: 0-10  Pain Score: 5 - Moderate pain  Pain Type: Surgical pain  Pain Location: Knee  Pain Orientation: Right  Pain Interventions: Repositioned, Ambulation/increased activity, Cold pack  Response to Interventions: reports increased pain with ambulation    Cognition:  Cognition  Overall Cognitive Status: Within Functional Limits    General Assessments:      Activity Tolerance  Endurance: Decreased tolerance for upright activites (pt is lethargic)  Sensation  Sensation Comment: denies numbness and tingling              Static Sitting Balance  Static Sitting-Comment/Number of Minutes: good  Dynamic Sitting Balance  Dynamic Sitting-Comments: good  Static Standing Balance  Static Standing-Comment/Number of Minutes: fair  Dynamic Standing Balance  Dynamic Standing-Comments: fair    Functional Assessments:     Bed Mobility  Bed Mobility: Yes  Bed Mobility 1  Bed Mobility 1: Supine to sitting  Level of Assistance 1: Close supervision  Transfers  Transfer: Yes  Transfer 1  Transfer From 1: Sit to  Transfer to 1:  Stand  Transfer Device 1: Walker  Transfer Level of Assistance 1: Minimum assistance  Transfers 2  Transfer From 2: Stand to  Transfer to 2: Sit  Transfer Device 2: Walker  Transfer Level of Assistance 2: Close supervision  Ambulation/Gait Training  Ambulation/Gait Training Performed: Yes  Ambulation/Gait Training 1  Device 1: Rolling walker  Gait Support Devices: Gait belt  Assistance 1: Contact guard  Quality of Gait 1: Inconsistent stride length, Decreased step length (slow pace, step to gait)  Comments/Distance (ft) 1: 5 feet bed to chair        Treatment    Cues for safe hand placement with use of FWW for sit<>stand. Instruction provided in step to gait pattern with cues given for sequencing with FWW. Cues given for turning strategy to avoid pivoting on right LE. Cues throughout for safe pacing and dep breathing.     Instruction provided in 10 ankle pumps, quad sets, glute sets, LAQ's right LE, and heel slides right LE order to maximize strength and circulation. Cues given for proper technique and parameters.     Extremity/Trunk Assessments:        RLE   RLE : Exceptions to WFL  AROM RLE (degrees)  RLE AROM Comment: WFL except knee impaired  R Knee Flexion 0-130: 90  R Knee Extension 0-130:  (5 from 0)  Strength RLE  RLE Overall Strength: Greater than or equal to 3/5 as evidenced by functional mobility  LLE   LLE : Within Functional Limits    Outcome Measures:  [facility] Basic Mobility  Turning from your back to your side while in a flat bed without using bedrails: A little  Moving from lying on your back to sitting on the side of a flat bed without using bedrails: A little  Moving to and from bed to chair (including a wheelchair): A little  Standing up from a chair using your arms (e.g. wheelchair or bedside chair): A little  To walk in hospital room: A little  Climbing 3-5 steps with railing: A little  Basic Mobility - Total Score: 18                            Goals:  Encounter Problems       Encounter Problems  (Active)       PT Problem       Pt will perform bed mobility with mod I.  (Progressing)       Start:  04/18/24    Expected End:  05/02/24            Pt will complete sit <> stand and bed <> chair transfers with mod I.  (Progressing)       Start:  04/18/24    Expected End:  05/02/24            Pt will ambulate 300 feet mod I using FWW with no significant gait deviations.   (Progressing)       Start:  04/18/24    Expected End:  05/02/24            Pt will ascend/descend at least 3 stairs SBA in order to navigate home and community environment.   (Progressing)       Start:  04/18/24    Expected End:  05/02/24            Pt will verbalize and demonstrate understanding of TKA supine/seated exercises.        Start:  04/18/24    Expected End:  05/02/24                     Education Documentation  Precautions, taught by Sonia Irizarry PT at 4/18/2024  3:50 PM.  Learner: Patient  Readiness: Acceptance  Method: Explanation  Response: Verbalizes Understanding, Needs Reinforcement    Body Mechanics, taught by Sonia Irizarry PT at 4/18/2024  3:50 PM.  Learner: Patient  Readiness: Acceptance  Method: Explanation  Response: Verbalizes Understanding, Needs Reinforcement    Home Exercise Program, taught by Sonia Irizarry PT at 4/18/2024  3:50 PM.  Learner: Patient  Readiness: Acceptance  Method: Explanation  Response: Verbalizes Understanding, Needs Reinforcement    Mobility Training, taught by Sonia Irizarry PT at 4/18/2024  3:50 PM.  Learner: Patient  Readiness: Acceptance  Method: Explanation  Response: Verbalizes Understanding, Needs Reinforcement    Education Comments  No comments found.

## 2024-04-19 ENCOUNTER — DOCUMENTATION (OUTPATIENT)
Dept: HOME HEALTH SERVICES | Facility: HOME HEALTH | Age: 74
End: 2024-04-19
Payer: MEDICARE

## 2024-04-19 ENCOUNTER — HOME HEALTH ADMISSION (OUTPATIENT)
Dept: HOME HEALTH SERVICES | Facility: HOME HEALTH | Age: 74
End: 2024-04-19
Payer: MEDICARE

## 2024-04-19 VITALS
DIASTOLIC BLOOD PRESSURE: 62 MMHG | HEART RATE: 80 BPM | HEIGHT: 63 IN | TEMPERATURE: 96.8 F | BODY MASS INDEX: 35.26 KG/M2 | OXYGEN SATURATION: 96 % | SYSTOLIC BLOOD PRESSURE: 130 MMHG | WEIGHT: 199 LBS | RESPIRATION RATE: 16 BRPM

## 2024-04-19 LAB
ANION GAP SERPL CALC-SCNC: 13 MMOL/L (ref 10–20)
BUN SERPL-MCNC: 13 MG/DL (ref 6–23)
CALCIUM SERPL-MCNC: 9 MG/DL (ref 8.6–10.3)
CHLORIDE SERPL-SCNC: 92 MMOL/L (ref 98–107)
CO2 SERPL-SCNC: 23 MMOL/L (ref 21–32)
CREAT SERPL-MCNC: 0.63 MG/DL (ref 0.5–1.05)
EGFRCR SERPLBLD CKD-EPI 2021: >90 ML/MIN/1.73M*2
ERYTHROCYTE [DISTWIDTH] IN BLOOD BY AUTOMATED COUNT: 12.5 % (ref 11.5–14.5)
GLUCOSE SERPL-MCNC: 133 MG/DL (ref 74–99)
HCT VFR BLD AUTO: 28.1 % (ref 36–46)
HGB BLD-MCNC: 9.7 G/DL (ref 12–16)
MCH RBC QN AUTO: 35.4 PG (ref 26–34)
MCHC RBC AUTO-ENTMCNC: 34.5 G/DL (ref 32–36)
MCV RBC AUTO: 103 FL (ref 80–100)
NRBC BLD-RTO: 0 /100 WBCS (ref 0–0)
PLATELET # BLD AUTO: 132 X10*3/UL (ref 150–450)
POTASSIUM SERPL-SCNC: 4.3 MMOL/L (ref 3.5–5.3)
RBC # BLD AUTO: 2.74 X10*6/UL (ref 4–5.2)
SODIUM SERPL-SCNC: 124 MMOL/L (ref 136–145)
WBC # BLD AUTO: 14 X10*3/UL (ref 4.4–11.3)

## 2024-04-19 PROCEDURE — 85027 COMPLETE CBC AUTOMATED: CPT | Performed by: ORTHOPAEDIC SURGERY

## 2024-04-19 PROCEDURE — 2500000001 HC RX 250 WO HCPCS SELF ADMINISTERED DRUGS (ALT 637 FOR MEDICARE OP): Performed by: ORTHOPAEDIC SURGERY

## 2024-04-19 PROCEDURE — 80051 ELECTROLYTE PANEL: CPT | Performed by: ORTHOPAEDIC SURGERY

## 2024-04-19 PROCEDURE — 7100000011 HC EXTENDED STAY RECOVERY HOURLY - NURSING UNIT

## 2024-04-19 PROCEDURE — 97165 OT EVAL LOW COMPLEX 30 MIN: CPT | Mod: GO | Performed by: OCCUPATIONAL THERAPIST

## 2024-04-19 PROCEDURE — 2500000006 HC RX 250 W HCPCS SELF ADMINISTERED DRUGS (ALT 637 FOR ALL PAYERS): Performed by: ORTHOPAEDIC SURGERY

## 2024-04-19 PROCEDURE — 97116 GAIT TRAINING THERAPY: CPT | Mod: GP,CQ

## 2024-04-19 PROCEDURE — 2500000004 HC RX 250 GENERAL PHARMACY W/ HCPCS (ALT 636 FOR OP/ED): Mod: JZ | Performed by: ORTHOPAEDIC SURGERY

## 2024-04-19 PROCEDURE — 99232 SBSQ HOSP IP/OBS MODERATE 35: CPT | Performed by: PHYSICIAN ASSISTANT

## 2024-04-19 PROCEDURE — 36415 COLL VENOUS BLD VENIPUNCTURE: CPT | Performed by: ORTHOPAEDIC SURGERY

## 2024-04-19 PROCEDURE — 2500000005 HC RX 250 GENERAL PHARMACY W/O HCPCS: Performed by: ORTHOPAEDIC SURGERY

## 2024-04-19 PROCEDURE — 97110 THERAPEUTIC EXERCISES: CPT | Mod: GP,CQ

## 2024-04-19 RX ORDER — OXYCODONE HYDROCHLORIDE 5 MG/1
5 TABLET ORAL EVERY 6 HOURS PRN
Qty: 28 TABLET | Refills: 0 | Status: SHIPPED | OUTPATIENT
Start: 2024-04-19 | End: 2024-04-30 | Stop reason: SDUPTHER

## 2024-04-19 RX ORDER — DOCUSATE SODIUM 100 MG/1
100 CAPSULE, LIQUID FILLED ORAL 2 TIMES DAILY
Qty: 20 CAPSULE | Refills: 0 | Status: SHIPPED | OUTPATIENT
Start: 2024-04-19 | End: 2024-04-29

## 2024-04-19 RX ORDER — ASPIRIN 81 MG/1
81 TABLET ORAL 2 TIMES DAILY
Qty: 60 TABLET | Refills: 0 | Status: SHIPPED | OUTPATIENT
Start: 2024-04-19 | End: 2024-05-19

## 2024-04-19 RX ORDER — TIZANIDINE 2 MG/1
4 TABLET ORAL EVERY 8 HOURS PRN
Qty: 15 TABLET | Refills: 0 | Status: SHIPPED | OUTPATIENT
Start: 2024-04-19 | End: 2024-04-24

## 2024-04-19 RX ADMIN — ATORVASTATIN CALCIUM 10 MG: 10 TABLET, FILM COATED ORAL at 08:13

## 2024-04-19 RX ADMIN — ESCITALOPRAM OXALATE 20 MG: 20 TABLET ORAL at 08:13

## 2024-04-19 RX ADMIN — TRANEXAMIC ACID 1950 MG: 650 TABLET ORAL at 06:35

## 2024-04-19 RX ADMIN — LISINOPRIL 10 MG: 10 TABLET ORAL at 08:13

## 2024-04-19 RX ADMIN — PANTOPRAZOLE SODIUM 40 MG: 40 TABLET, DELAYED RELEASE ORAL at 06:35

## 2024-04-19 RX ADMIN — FERROUS SULFATE TAB 325 MG (65 MG ELEMENTAL FE) 1 TABLET: 325 (65 FE) TAB at 08:13

## 2024-04-19 RX ADMIN — ASPIRIN 81 MG: 81 TABLET, COATED ORAL at 08:13

## 2024-04-19 RX ADMIN — ACETAMINOPHEN 650 MG: 325 TABLET ORAL at 06:35

## 2024-04-19 RX ADMIN — OXYCODONE HYDROCHLORIDE 10 MG: 10 TABLET ORAL at 08:13

## 2024-04-19 RX ADMIN — OXYCODONE HYDROCHLORIDE 10 MG: 10 TABLET ORAL at 04:08

## 2024-04-19 RX ADMIN — CEFAZOLIN SODIUM 2 G: 2 INJECTION, SOLUTION INTRAVENOUS at 01:22

## 2024-04-19 RX ADMIN — LEVOTHYROXINE SODIUM 125 MCG: 125 TABLET ORAL at 06:35

## 2024-04-19 RX ADMIN — ONDANSETRON 4 MG: 4 TABLET, ORALLY DISINTEGRATING ORAL at 08:15

## 2024-04-19 RX ADMIN — BENZOCAINE AND MENTHOL 1 LOZENGE: 15; 3.6 LOZENGE ORAL at 01:22

## 2024-04-19 RX ADMIN — ACETAMINOPHEN 650 MG: 325 TABLET ORAL at 01:22

## 2024-04-19 RX ADMIN — DOCUSATE SODIUM 100 MG: 100 CAPSULE, LIQUID FILLED ORAL at 08:13

## 2024-04-19 ASSESSMENT — ACTIVITIES OF DAILY LIVING (ADL): ADL_ASSISTANCE: INDEPENDENT

## 2024-04-19 ASSESSMENT — COGNITIVE AND FUNCTIONAL STATUS - GENERAL
MOBILITY SCORE: 21
DRESSING REGULAR LOWER BODY CLOTHING: A LITTLE
WALKING IN HOSPITAL ROOM: A LITTLE
MOVING TO AND FROM BED TO CHAIR: A LITTLE
DAILY ACTIVITIY SCORE: 21
CLIMB 3 TO 5 STEPS WITH RAILING: A LITTLE
TOILETING: A LITTLE
HELP NEEDED FOR BATHING: A LITTLE

## 2024-04-19 ASSESSMENT — PAIN - FUNCTIONAL ASSESSMENT
PAIN_FUNCTIONAL_ASSESSMENT: 0-10

## 2024-04-19 ASSESSMENT — PAIN SCALES - GENERAL
PAINLEVEL_OUTOF10: 9
PAINLEVEL_OUTOF10: 8
PAINLEVEL_OUTOF10: 6
PAINLEVEL_OUTOF10: 1

## 2024-04-19 NOTE — CARE PLAN
Problem: Skin  Goal: Decreased wound size/increased tissue granulation at next dressing change  4/19/2024 0734 by Alma Rosa Johnson RN  Outcome: Progressing  4/19/2024 0734 by Alma Rosa Johnson RN  Outcome: Progressing  Goal: Participates in plan/prevention/treatment measures  4/19/2024 0734 by Alma Rosa Johnson RN  Outcome: Progressing  4/19/2024 0734 by Alma Rosa Johnson RN  Outcome: Progressing  Goal: Prevent/manage excess moisture  4/19/2024 0734 by Alma Rosa Johnson RN  Outcome: Progressing  4/19/2024 0734 by Alma Rosa Johnson RN  Outcome: Progressing  Goal: Prevent/minimize sheer/friction injuries  4/19/2024 0734 by Alma Rosa Johnson RN  Outcome: Progressing  4/19/2024 0734 by Alma Rosa Johnson RN  Outcome: Progressing  Goal: Promote/optimize nutrition  4/19/2024 0734 by Alma Rosa Johnson RN  Outcome: Progressing  4/19/2024 0734 by Alma Rosa Johnson RN  Outcome: Progressing  Goal: Promote skin healing  4/19/2024 0734 by Alma Rosa Johnson RN  Outcome: Progressing  4/19/2024 0734 by Alma Rosa Johnson RN  Outcome: Progressing     Problem: Pain  Goal: Takes deep breaths with improved pain control throughout the shift  4/19/2024 0734 by Alma Rosa Johnson RN  Outcome: Progressing  4/19/2024 0734 by Alma Rosa Johnson RN  Outcome: Progressing  Goal: Turns in bed with improved pain control throughout the shift  4/19/2024 0734 by Alma Rosa Johnson RN  Outcome: Progressing  4/19/2024 0734 by Alma Rosa Johnson RN  Outcome: Progressing  Goal: Walks with improved pain control throughout the shift  4/19/2024 0734 by Alma Rosa Johnson RN  Outcome: Progressing  4/19/2024 0734 by Alma Rosa Johnson RN  Outcome: Progressing  Goal: Performs ADL's with improved pain control throughout shift  4/19/2024 0734 by Alma Rosa Johnson RN  Outcome: Progressing  4/19/2024 0734 by Alma Rosa Johnson RN  Outcome: Progressing  Goal: Participates in PT with improved pain control throughout the shift  4/19/2024 0734 by Alma Rosa Johnson RN  Outcome: Progressing  4/19/2024 0734 by Xiufen Johnson, RN  Outcome: Progressing  Goal:  Free from opioid side effects throughout the shift  4/19/2024 0734 by Alma Rosa Johnson RN  Outcome: Progressing  4/19/2024 0734 by Alma Rosa Johnson RN  Outcome: Progressing  Goal: Free from acute confusion related to pain meds throughout the shift  4/19/2024 0734 by Alma Rosa Johnson RN  Outcome: Progressing  4/19/2024 0734 by Alma Rosa Johnson RN  Outcome: Progressing     Problem: Safety - Adult  Goal: Free from fall injury  4/19/2024 0734 by Alma Rosa Johnson RN  Outcome: Progressing  4/19/2024 0734 by Alma Rosa Johnson RN  Outcome: Progressing     Problem: Knee Replacement Intial Post Op  Goal: Activity/Mobility Safety  4/19/2024 0734 by Alma Rosa Johnson RN  Outcome: Progressing  4/19/2024 0734 by Alma Rosa Johnson RN  Outcome: Progressing  Goal: Treatment Immediate Post Op  4/19/2024 0734 by Alma Rosa Johnson RN  Outcome: Progressing  4/19/2024 0734 by Alma Rosa Johnson RN  Outcome: Progressing  Goal: Treatment  4/19/2024 0734 by Alma Rosa Johnson RN  Outcome: Progressing  4/19/2024 0734 by Alma Rosa Johnson RN  Outcome: Progressing     Problem: Knee Replacement Post Op Day 1  Goal: Activity/Mobility Safety  4/19/2024 0734 by Alma Rosa Johnson RN  Outcome: Progressing  4/19/2024 0734 by Alm aRosa Johnson RN  Outcome: Progressing  Goal: Treatment  4/19/2024 0734 by Alma Rosa Johnson RN  Outcome: Progressing  4/19/2024 0734 by Alma Rosa Johnson RN  Outcome: Progressing     Problem: Knee Replacement Post Op Day 2  Goal: Activity/Mobility Safety  4/19/2024 0734 by Alma Rosa Johnson RN  Outcome: Progressing  4/19/2024 0734 by Alma Rosa Johnson RN  Outcome: Progressing  Goal: Treatment  4/19/2024 0734 by Alma Rosa Johnson RN  Outcome: Progressing  4/19/2024 0734 by Alma Rosa Johnson RN  Outcome: Progressing     Problem: Meds/Post-op Pain  Goal: Pain controlled to tolerate pain level  4/19/2024 0734 by Alma Rosa Johnson RN  Outcome: Progressing  4/19/2024 0734 by Alma Rosa Johnson RN  Outcome: Progressing  Goal: Tolerates prescribed medication  4/19/2024 0734 by Alma Rosa Johnson RN  Outcome:  Progressing  4/19/2024 0734 by Alma Rosa Johnson RN  Outcome: Progressing     Problem: DVT/VTE Prevention/Activity  Goal: No decrease in circulation/sensation  4/19/2024 0734 by Alma Rosa Johnson RN  Outcome: Progressing  4/19/2024 0734 by Alma Rosa Johnson RN  Outcome: Progressing  Goal: Prevent skin breakdown  4/19/2024 0734 by Alma Rosa Johnson RN  Outcome: Progressing  4/19/2024 0734 by Alma Rosa Johnson RN  Outcome: Progressing  Goal: Return to preop oxygenation status  4/19/2024 0734 by Alma Rosa Johnson RN  Outcome: Progressing  4/19/2024 0734 by Alma Rosa Johnson RN  Outcome: Progressing  Goal: Tolerates optimal activity  4/19/2024 0734 by Alma Rosa Johnson RN  Outcome: Progressing  4/19/2024 0734 by Alma Rosa Johnson RN  Outcome: Progressing  Goal: Increase self care and/or family involvement in 24 hrs.  4/19/2024 0734 by Alma Rosa Johnson RN  Outcome: Progressing  4/19/2024 0734 by Alma Rosa Johnson RN  Outcome: Progressing     Problem: Wound care/infection prevention  Goal: No signs of infection in 24 hrs.  4/19/2024 0734 by Alma Rosa Johnson RN  Outcome: Progressing  4/19/2024 0734 by Alma Rosa Johnson RN  Outcome: Progressing  Goal: No unexpected bleeding from incision this shift  4/19/2024 0734 by Alma Rosa Johnson RN  Outcome: Progressing  4/19/2024 0734 by Alma Rosa Johnson RN  Outcome: Progressing     Problem: Diet/fluid balance  Goal: Adequate urinary output  4/19/2024 0734 by Alma Rosa Johnson RN  Outcome: Progressing  4/19/2024 0734 by Alma Rosa Johnson RN  Outcome: Progressing  Goal: Free from nausea/vomiting  4/19/2024 0734 by Alma Rosa Johnson RN  Outcome: Progressing  4/19/2024 0734 by Alma Rosa Johnson RN  Outcome: Progressing  Goal: Return in bowel function  4/19/2024 0734 by Alma Rosa Johnson RN  Outcome: Progressing  4/19/2024 0734 by Alma Rosa Johnson RN  Outcome: Progressing  Goal: Tolerates prescribed diet  4/19/2024 0734 by Alma Rosa Johnson RN  Outcome: Progressing  4/19/2024 0734 by Alma Rosa Johnson, RN  Outcome: Progressing     Problem: Other goals  Goal: No change in  neurological status  4/19/2024 0734 by Alma Rosa Johnson RN  Outcome: Progressing  4/19/2024 0734 by Alma Rosa Johnson RN  Outcome: Progressing  Goal: Stabilize vital signs (return to 10% of baseline)  4/19/2024 0734 by Alma Rosa Johnson RN  Outcome: Progressing  4/19/2024 0734 by Alma Rosa Johnson RN  Outcome: Progressing      The clinical goals for the shift include Pain control and rest well.    Over the shift, the patient did  make progress toward the goals.

## 2024-04-19 NOTE — DISCHARGE SUMMARY
Discharge summary      This patient Yamilex Ye was admitted to the hospital on 4/18/2024  after undergoing Procedure(s) (LRB):  Total Knee Arthroplasty (Right) without complications.      No significant or unexpected findings or abnormal ortho imaging were noted during the hospital stay    Hospital course      Patient tolerated surgical procedure well and there was no complications. Patient progressed adequately through their recovery during hospital stay including PT and rehabilitation.    Patient was then D/C on 4/19/2024 to home  in stable condition.  Patient was instructed on the use of pain medications, the signs and symptoms of infection, and was given our number to call should they have any questions or concerns following discharge.    Based on my clinical judgment, the patient was provided with a 7-day prescription for opioid medication at 30 MED, indicated for treatment of acute pain in the setting of recent right TKA. OARRS report was run and has demonstrated an appropriate time course.  The patient has been provided with counseling pertaining to safe use of opioid medication.    Pertinent Physical Exam At Time of Discharge  Alert, oriented x 3, cooperative with examination.     Examination of the right lower  extremity reveals right knee  dressing is intact without drainage.  No jose guadalupe-incisional ecchymosis.  EHL, plantarflexion, dorsiflexion are 4+/5.  Able to isometrically fire right  quadriceps.  Sensory intact light touch in the deep/superficial/common peroneal, saphenous, tibial, sural nerve distributions.  DP and popliteal pulses are 2+ with capillary refill less than 2 seconds.  Negative Homans' sign.      Home Medications     Medication List      START taking these medications     aspirin 81 mg EC tablet; Take 1 tablet (81 mg) by mouth 2 times a day.   docusate sodium 100 mg capsule; Commonly known as: Colace; Take 1   capsule (100 mg) by mouth 2 times a day for 10 days. Stop taking if you    have diarrhea   oxyCODONE 5 mg immediate release tablet; Commonly known as: Roxicodone;   Take 1 tablet (5 mg) by mouth every 6 hours if needed for moderate pain (4   - 6) or severe pain (7 - 10) for up to 7 days.   tiZANidine 2 mg tablet; Commonly known as: Zanaflex; Take 2 tablets (4   mg) by mouth every 8 hours if needed for muscle spasms for up to 5 days.     CHANGE how you take these medications     diphenoxylate-atropine 2.5-0.025 mg tablet; Commonly known as: Lomotil;   Take 2 tablets by mouth in the morning and 2 tablets in the evening and 2   tablets before bedtime.; What changed: how much to take, when to take   this, reasons to take this     CONTINUE taking these medications     atorvastatin 10 mg tablet; Commonly known as: Lipitor; Take 1 tablet (10   mg) by mouth once daily. For cholesterol   cholecalciferol 50 mcg (2,000 unit) capsule; Commonly known as: Vitamin   D-3   cyanocobalamin 1,000 mcg tablet; Commonly known as: Vitamin B-12   escitalopram 20 mg tablet; Commonly known as: Lexapro; Take 1 tablet (20   mg) by mouth once daily.   ferrous sulfate (325 mg ferrous sulfate) tablet; Commonly known as:   FerrouSuL; Take 1 tablet by mouth once daily with breakfast.   Heliocare 240 mg capsule; Generic drug: polypodium leucotomos extract   levothyroxine 125 mcg tablet; Commonly known as: Synthroid, Levoxyl;   Take 1 tablet (125 mcg) by mouth once daily in the morning. Take before   meals.   lisinopril 10 mg tablet; Take 1 tablet (10 mg) by mouth once daily. For   blood pressure   LORazepam 0.5 mg tablet; Commonly known as: Ativan; Take 1 tablet (0.5   mg) by mouth once daily as needed for anxiety.   metroNIDAZOLE 0.75 % gel; Commonly known as: Metrogel   omeprazole 40 mg DR capsule; Commonly known as: PriLOSEC; Take 1 capsule   (40 mg) by mouth 2 times a day.   ondansetron 4 mg tablet; Commonly known as: Zofran   white petrolatum-mineral oiL 94-3 % ophthalmic ointment; Commonly known   as: Tears  Naturale PM     STOP taking these medications     albuterol 90 mcg/actuation aerosol powdr breath activated inhaler   chlorhexidine 0.12 % solution; Commonly known as: Peridex   fluticasone 50 mcg/actuation nasal spray; Commonly known as: Flonase   omega 3-dha-epa-fish oil 300-1,000 mg capsule,delayed release(DR/EC)           Patient may bear weight as tolerated to operative extremity with use of walker for assistance with ambulation   Mepilex dressing to remain in place until first postop visit with Dr. Newman  Aspirin 81 mg twice daily for DVT prophylaxis started on 4/19 and to be taken for 30 days  Follow up with surgeon in 2 weeks        Outpatient Follow-Up  Future Appointments   Date Time Provider Department Center   5/9/2024 11:00 AM Chivo Newman MD [id number] [place]   7/11/2024  2:00 PM Bhavik Tristan MD [id number] [place]   10/24/2024  1:30 PM Bhavik Tristan MD [id number] [place]             Briana Cullen PA-C

## 2024-04-19 NOTE — PROGRESS NOTES
"Yamilex Ye is a 73 y.o. female  presenting with Unilateral primary osteoarthritis, right knee.    Subjective   Ms. Ye feels good this morning, rates her right knee pain at 8/10 but is due for pain medication.  She is voiding without any issues and is tolerating oral intake.  She is looking forward to going home later today.       Objective     Physical Exam  Musculoskeletal:      Comments: Right knee dressing is dry and intact.  light touch sensation is intact, ankle dorsiflexion/plantar flexion is intact. DP 2+/2 palpable           Last Recorded Vitals  Blood pressure 138/70, pulse 76, temperature 36.2 °C (97.2 °F), temperature source Temporal, resp. rate 16, height 1.6 m (5' 3\"), weight 90.3 kg (199 lb), SpO2 94%.  Intake/Output last 3 Shifts:  I/O last 3 completed shifts:  In: 2072.5 (23 mL/kg) [P.O.:480; I.V.:642.5 (7.1 mL/kg); IV Piggyback:950]  Out: 250 (2.8 mL/kg) [Urine:250 (0.1 mL/kg/hr)]  Weight: 90.3 kg     Relevant Results      Scheduled medications  acetaminophen, 650 mg, oral, q6h TANYA  aspirin, 81 mg, oral, BID  atorvastatin, 10 mg, oral, Daily  docusate sodium, 100 mg, oral, BID  escitalopram, 20 mg, oral, Daily  ferrous sulfate (325 mg ferrous sulfate), 1 tablet, oral, Daily with breakfast  levothyroxine, 125 mcg, oral, Daily before breakfast  lisinopril, 10 mg, oral, Daily  pantoprazole, 40 mg, oral, BID AC      Continuous medications  lactated Ringer's, 100 mL/hr  lactated Ringer's, 50 mL/hr, Last Rate: 50 mL/hr (04/18/24 1833)  oxygen, 2 L/min, Last Rate: Stopped (04/18/24 1315)      PRN medications  PRN medications: albuterol, benzocaine-menthol, bisacodyl, cyclobenzaprine, diphenhydrAMINE, HYDROmorphone, LORazepam, naloxone, naloxone, naloxone, naloxone, ondansetron ODT **OR** ondansetron, oxyCODONE, oxyCODONE, prochlorperazine **OR** prochlorperazine **OR** prochlorperazine  Results for orders placed or performed during the hospital encounter of 04/18/24 (from the past 24 hour(s)) "   CBC   Result Value Ref Range    WBC 14.0 (H) 4.4 - 11.3 x10*3/uL    nRBC 0.0 0.0 - 0.0 /100 WBCs    RBC 2.74 (L) 4.00 - 5.20 x10*6/uL    Hemoglobin 9.7 (L) 12.0 - 16.0 g/dL    Hematocrit 28.1 (L) 36.0 - 46.0 %     (H) 80 - 100 fL    MCH 35.4 (H) 26.0 - 34.0 pg    MCHC 34.5 32.0 - 36.0 g/dL    RDW 12.5 11.5 - 14.5 %    Platelets 132 (L) 150 - 450 x10*3/uL   Basic metabolic panel   Result Value Ref Range    Glucose 133 (H) 74 - 99 mg/dL    Sodium 124 (L) 136 - 145 mmol/L    Potassium 4.3 3.5 - 5.3 mmol/L    Chloride 92 (L) 98 - 107 mmol/L    Bicarbonate 23 21 - 32 mmol/L    Anion Gap 13 10 - 20 mmol/L    Urea Nitrogen 13 6 - 23 mg/dL    Creatinine 0.63 0.50 - 1.05 mg/dL    eGFR >90 >60 mL/min/1.73m*2    Calcium 9.0 8.6 - 10.3 mg/dL                            Assessment/Plan   Principal Problem:    Unilateral primary osteoarthritis, right knee  Active Problems:    Total knee replacement status, right    POD #1 s/p right TKA  Continue PT/OT, WBAT right  LE  Reviewed am labs  Multimodal pain regimen  knee dressing to be removed on post op day #7  VTE prophylaxis: aspirin 81mg BID  Will discharge home when appropriate with [facility]  Follow up with Dr. Newman as directed         I spent 25 minutes in the professional and overall care of this patient.      Briana Cullen PA-C

## 2024-04-19 NOTE — PROGRESS NOTES
Physical Therapy    Physical Therapy    Physical Therapy Treatment    Patient Name: Yamilex Ye  MRN: [id number]  Today's Date: 4/19/2024  Time Calculation  Start Time: 0808  Stop Time: 0846  Time Calculation (min): 38 min       Assessment/Plan   PT Assessment  PT Assessment Results: Decreased strength, Decreased range of motion, Decreased endurance, Impaired balance, Decreased mobility, Pain, Orthopedic restrictions  End of Session Communication: Bedside nurse  End of Session Patient Position: Up in chair  PT Plan  Inpatient/Swing Bed or Outpatient: Inpatient  PT Plan  Treatment/Interventions: Bed mobility, Transfer training, Gait training, Balance training, Neuromuscular re-education, Strengthening, Endurance training, Range of motion, Therapeutic exercise, Therapeutic activity, Home exercise program  PT Plan: Skilled PT  PT Frequency: BID  PT Discharge Recommendations: Low intensity level of continued care  Equipment Recommended upon Discharge: Wheeled walker  PT Recommended Transfer Status: Assist x1  PT - OK to Discharge: Yes (     04/19/24 0808   PT  Visit   PT Received On 04/19/24   Response to Previous Treatment Patient with no complaints from previous session.   General   Reason for Referral TKR Right   Patient Position Received Bed, 3 rail up   Preferred Learning Style verbal;visual   Precautions   LE Weight Bearing Status Weight Bearing as Tolerated   Pain Assessment   Pain Assessment 0-10   Pain Score 8   Pain Type Surgical pain   Pain Location Knee   Pain Orientation Right   Pain Interventions Cold applied   Cognition   Overall Cognitive Status WFL   Therapeutic Exercise   Therapeutic Exercise Performed Yes   Therapeutic Exercise Activity 1 AP,QS,GS,SLR,HIP ABD, MARCHING,BALL SQUEEZES X 20 B WITH EMPHASIS ON OPERATED LE   Therapeutic Exercise Activity 2 ROM  (Right Knee 5-110)   Bed Mobility   Bed Mobility Yes   Bed Mobility 1   Bed Mobility 1 Supine to sitting   Level of Assistance 1 Close  supervision   Ambulation/Gait Training   Ambulation/Gait Training Performed Yes   Ambulation/Gait Training 1   Surface 1 Level tile   Device 1 Rolling walker   Gait Support Devices Gait belt   Assistance 1 Close supervision   Comments/Distance (ft) 1 300  (Patient demos grossly steady reciprocating steps with good heel stike to  off pattern needs occasional cue to avoid ER  R le, Mild limp  L)   Transfers   Transfer Yes   Transfer 1   Technique 1 Sit to stand;Stand to sit   Transfer Device 1 Walker   Transfer Level of Assistance 1 Close supervision   Trials/Comments 1 x3   Stairs   Stairs Yes   Stairs   Rails 1 Left   Device 1 Single point cane   Support Devices 1 Gait belt   Assistance 1 Contact guard   Comment/Number of Steps 1 x3  (Patient dmeos good stability and safety awarenss on stairs)   PT Assessment   PT Assessment Results Decreased strength;Decreased range of motion;Decreased endurance;Impaired balance;Decreased mobility;Pain;Orthopedic restrictions   End of Session Communication Bedside nurse   End of Session Patient Position Up in chair   PT Plan   Inpatient/Swing Bed or Outpatient Inpatient     Outcome Measures:  [facility] Basic Mobility  Turning from your back to your side while in a flat bed without using bedrails: None  Moving from lying on your back to sitting on the side of a flat bed without using bedrails: None  Moving to and from bed to chair (including a wheelchair): A little  Standing up from a chair using your arms (e.g. wheelchair or bedside chair): None  To walk in hospital room: A little  Climbing 3-5 steps with railing: A little  Basic Mobility - Total Score: 21                             EDUCATION:     Education Documentation  No documentation found.  Education Comments  No comments found.        GOALS:  Encounter Problems       Encounter Problems (Active)       PT Problem       Pt will perform bed mobility with mod I.  (Progressing)       Start:  04/18/24    Expected End:  05/02/24             Pt will complete sit <> stand and bed <> chair transfers with mod I.  (Progressing)       Start:  04/18/24    Expected End:  05/02/24            Pt will ambulate 300 feet mod I using FWW with no significant gait deviations.   (Progressing)       Start:  04/18/24    Expected End:  05/02/24            Pt will ascend/descend at least 3 stairs SBA in order to navigate home and community environment.   (Progressing)       Start:  04/18/24    Expected End:  05/02/24            Pt will verbalize and demonstrate understanding of TKA supine/seated exercises.  (Progressing)       Start:  04/18/24    Expected End:  05/02/24

## 2024-04-19 NOTE — HH CARE COORDINATION
Home Care received a Referral for Physical Therapy and Occupational Therapy. We have processed the referral for a Start of Care on 4/20/24.     If you have any questions or concerns, please feel free to contact us at [phone]. Follow the prompts, enter your five digit zip code, and you will be directed to your care team on [facility].

## 2024-04-19 NOTE — PROGRESS NOTES
Occupational Therapy    Evaluation    Patient Name: Yamilex Ye  MRN: [id number]  Today's Date: 4/19/2024  Time Calculation  Start Time: 0915  Stop Time: 0935  Time Calculation (min): 20 min  Eval only     Assessment  IP OT Assessment  Prognosis: Good  End of Session Communication: Bedside nurse  End of Session Patient Position: Up in chair  Patient presents with decline in ADLs, functional transfers, functional mobility and would benefit from OT during acute stay to improve functional independence and safety. Recommend low intensity OT to maximize functional independence and safety.     Plan:  Treatment Interventions: ADL retraining, Functional transfer training, Patient/family training, Equipment evaluation/education, Compensatory technique education  OT Frequency: Daily  OT Discharge Recommendations: Low intensity level of continued care  Equipment Recommended upon Discharge: Wheeled walker (shower chair, reacher, sock aide, LH shoe horn)  OT - OK to Discharge: Yes from acute care OT services to the next level of care when cleared by medical team      Subjective   Current Problem:  1. Unilateral primary osteoarthritis, right knee        2. Total knee replacement status, right  aspirin 81 mg EC tablet    tiZANidine (Zanaflex) 2 mg tablet    docusate sodium (Colace) 100 mg capsule    oxyCODONE (Roxicodone) 5 mg immediate release tablet    Referral to Home Health          General:  General  Reason for Referral: right TKR  Referred By: Chivo Newman MD  Past Medical History Relevant to Rehab: Admitted 4/18/2024 after having right TKR completed by Dr Newman.  Prior to Session Communication: Bedside nurse  Patient Position Received: Up in chair, Alarm on  Preferred Learning Style: verbal  General Comment: Patient seated in bedside chair and agreeable to participate in OT evaluation.    Precautions:  LE Weight Bearing Status: Weight Bearing as Tolerated  Medical Precautions: Fall precautions    Pain:  Pain  Assessment  Pain Assessment: 0-10  Pain Score:  (did not rate but grimacing and reported pain with movement)  Pain Type: Surgical pain  Pain Location: Knee  Pain Orientation: Right  Pain Interventions: Rest    Objective   Cognition:  Overall Cognitive Status: Within Functional Limits  Mildly impulsive    Home Living:  Type of Home: House  Lives With: Alone  Home Adaptive Equipment: Walker rolling or standard, Cane  Home Layout: One level  Home Access: Stairs to enter with rails  Entrance Stairs-Number of Steps: 1  Bathroom Shower/Tub: Walk-in shower  Bathroom Equipment: Shower chair with back     Prior Function:  ADL Assistance: Independent  Homemaking Assistance: Independent (hired assist for cleaning)  Prior Function Comments: denies any falls in the past 6 months    ADL:  UE Dressing Assistance: Independent  LE Dressing Assistance: Minimal (don underwear and pants)  ADL Comments: Educated patient on safety and comp strategies with lower body dressing. Patient verbalized understanding and able to provide return demonstration of understanding. Unable to don right shoe secondary to mild foot swelling and shoe too tight. Patient reports will go home with hospital  socks.     Activity Tolerance:  Endurance: Endurance does not limit participation in activity    Bed Mobility/Transfers:      Transfers  Transfer:  (supervision sit <>stand)  Educated patient on safety with car transfers and patient verbalized understanding.    Educated patient on safety and use of shower chair for safety and patient verbalized understanding. Educated patient on having [facility] assess shower safety prior to completing first shower and patient verbalized understanding.     Ambulation/Gait Training:  Functional Mobility  Functional Mobility Performed:  (supervision with  functional mobility task)    Extremities: RUE   RUE : Within Functional Limits and LUE   LUE: Within Functional Limits    Outcome Measures: [facility] Daily Activity  Putting on  and taking off regular lower body clothing: A little  Bathing (including washing, rinsing, drying): A little  Putting on and taking off regular upper body clothing: None  Toileting, which includes using toilet, bedpan or urinal: A little  Taking care of personal grooming such as brushing teeth: None  Eating Meals: None  Daily Activity - Total Score: 21    EDUCATION:  Education  Individual(s) Educated: Patient  Education Provided: Fall precautons (comp strategies with dressing, safety with car transfers)  Patient Response to Education: Patient/Caregiver Verbalized Understanding of Information    Goals:   Encounter Problems       Encounter Problems (Active)       Dressings Lower Extremities       STG - Patient will complete lower body dressing independently with AE and comp strategies  (Progressing)       Start:  04/19/24    Expected End:  05/03/24               Functional Balance       STG-Patient will be independent with assistive device dynamic stand task >5 minutes for ADL completion   (Progressing)       Start:  04/19/24    Expected End:  05/03/24               Functional Mobility       STG-Patient will be independent with assistive device functional mobility tasks   (Progressing)       Start:  04/19/24    Expected End:  05/03/24               OT Transfers       STG - Patient will perform car transfers independently demonstrating good safety  (Progressing)       Start:  04/19/24    Expected End:  05/03/24            STG-Patient will be independent with functional transfers demonstrating good safety   (Progressing)       Start:  04/19/24    Expected End:  05/03/24

## 2024-04-19 NOTE — PROGRESS NOTES
04/19/24 0925   Discharge Planning   Living Arrangements Alone   Support Systems Children;Family members   Type of Residence Private residence   Home or Post Acute Services In home services   Type of Home Care Services Home OT;Home PT   Patient expects to be discharged to: Home with [facility]   Does the patient need discharge transport arranged? No     Met with patient at bedside. Admitted for right knee replacement. Pt lives alone and was independent PTA with no HHC. Pt has a walker. PCP is Dr Salas. Pt is able to manage her health. Was able to drive to appointments and obtain medications. Therpy evals pending. Pt plans to return home with [facility] PT/OT. Internal referral requested. Daughter will be staying with patient until Tuesday, then cousin will be with patient after that. Family will provide transport home.   1022 [facility] referral sent. Medically ready for discharge. Dr Newman to follow. HC intake notified.

## 2024-04-20 ENCOUNTER — HOME CARE VISIT (OUTPATIENT)
Dept: HOME HEALTH SERVICES | Facility: HOME HEALTH | Age: 74
End: 2024-04-20
Payer: MEDICARE

## 2024-04-20 PROCEDURE — 1090000002 HH PPS REVENUE DEBIT

## 2024-04-20 PROCEDURE — G0151 HHCP-SERV OF PT,EA 15 MIN: HCPCS | Mod: HHH

## 2024-04-20 PROCEDURE — 0023 HH SOC

## 2024-04-20 PROCEDURE — 169592 NO-PAY CLAIM PROCEDURE

## 2024-04-20 PROCEDURE — 1090000001 HH PPS REVENUE CREDIT

## 2024-04-20 ASSESSMENT — ACTIVITIES OF DAILY LIVING (ADL)
OASIS_M1830: 03
ENTERING_EXITING_HOME: MINIMUM ASSIST
AMBULATION ASSISTANCE ON FLAT SURFACES: 1
AMBULATION_DISTANCE/DURATION_TOLERATED: 100 FT

## 2024-04-20 ASSESSMENT — ENCOUNTER SYMPTOMS
PAIN: 1
PERSON REPORTING PAIN: PATIENT
PAIN LOCATION - PAIN SEVERITY: 3/10
PAIN LOCATION: RIGHT KNEE

## 2024-04-21 PROCEDURE — 1090000002 HH PPS REVENUE DEBIT

## 2024-04-21 PROCEDURE — 1090000001 HH PPS REVENUE CREDIT

## 2024-04-22 ENCOUNTER — HOME CARE VISIT (OUTPATIENT)
Dept: HOME HEALTH SERVICES | Facility: HOME HEALTH | Age: 74
End: 2024-04-22
Payer: MEDICARE

## 2024-04-22 ENCOUNTER — TELEPHONE (OUTPATIENT)
Dept: PHYSICAL THERAPY | Facility: CLINIC | Age: 74
End: 2024-04-22
Payer: MEDICARE

## 2024-04-22 PROCEDURE — 1090000001 HH PPS REVENUE CREDIT

## 2024-04-22 PROCEDURE — 1090000002 HH PPS REVENUE DEBIT

## 2024-04-22 PROCEDURE — G0152 HHCP-SERV OF OT,EA 15 MIN: HCPCS | Mod: HHH

## 2024-04-22 SDOH — ECONOMIC STABILITY: HOUSING INSECURITY: HOME SAFETY: LIVES ALONE, RANCH HOME, BATHROOM HAS WALK IN SHOWER WITH GRAB BARS IN PLACE.

## 2024-04-22 ASSESSMENT — ENCOUNTER SYMPTOMS
LOWEST PAIN SEVERITY IN PAST 24 HOURS: 2/10
HIGHEST PAIN SEVERITY IN PAST 24 HOURS: 7/10
SUBJECTIVE PAIN PROGRESSION: GRADUALLY IMPROVING
PAIN LOCATION - PAIN SEVERITY: 2/10
PERSON REPORTING PAIN: PATIENT
PAIN LOCATION: RIGHT KNEE
PAIN: 1

## 2024-04-22 ASSESSMENT — ACTIVITIES OF DAILY LIVING (ADL)
DRESSING_UB_CURRENT_FUNCTION: INDEPENDENT
DRESSING_LB_CURRENT_FUNCTION: INDEPENDENT
TOILETING: 1
TOILETING: INDEPENDENT
BATHING ASSESSED: 1
BATHING_CURRENT_FUNCTION: INDEPENDENT

## 2024-04-22 NOTE — PROGRESS NOTES
Called patient to discuss outpatient PT. Pt is scheduled is [facility] upon completion of [facility]HC.

## 2024-04-23 ENCOUNTER — HOME CARE VISIT (OUTPATIENT)
Dept: HOME HEALTH SERVICES | Facility: HOME HEALTH | Age: 74
End: 2024-04-23
Payer: MEDICARE

## 2024-04-23 PROCEDURE — 1090000002 HH PPS REVENUE DEBIT

## 2024-04-23 PROCEDURE — 1090000001 HH PPS REVENUE CREDIT

## 2024-04-23 PROCEDURE — G0157 HHC PT ASSISTANT EA 15: HCPCS | Mod: CQ,HHH

## 2024-04-23 ASSESSMENT — ENCOUNTER SYMPTOMS
HIGHEST PAIN SEVERITY IN PAST 24 HOURS: 10/10
LOWEST PAIN SEVERITY IN PAST 24 HOURS: 4/10
PAIN LOCATION: RIGHT KNEE
SUBJECTIVE PAIN PROGRESSION: GRADUALLY IMPROVING
PAIN: 1
PAIN SEVERITY GOAL: 0/10
PERSON REPORTING PAIN: PATIENT

## 2024-04-24 PROCEDURE — 1090000002 HH PPS REVENUE DEBIT

## 2024-04-24 PROCEDURE — 1090000001 HH PPS REVENUE CREDIT

## 2024-04-25 ENCOUNTER — HOME CARE VISIT (OUTPATIENT)
Dept: HOME HEALTH SERVICES | Facility: HOME HEALTH | Age: 74
End: 2024-04-25
Payer: MEDICARE

## 2024-04-25 PROCEDURE — G0151 HHCP-SERV OF PT,EA 15 MIN: HCPCS | Mod: HHH

## 2024-04-25 PROCEDURE — 1090000002 HH PPS REVENUE DEBIT

## 2024-04-25 PROCEDURE — 1090000001 HH PPS REVENUE CREDIT

## 2024-04-25 ASSESSMENT — ENCOUNTER SYMPTOMS
PAIN LOCATION: RIGHT KNEE
PAIN: 1
PERSON REPORTING PAIN: PATIENT
PAIN LOCATION - PAIN SEVERITY: 4/10

## 2024-04-26 ENCOUNTER — HOME CARE VISIT (OUTPATIENT)
Dept: HOME HEALTH SERVICES | Facility: HOME HEALTH | Age: 74
End: 2024-04-26
Payer: MEDICARE

## 2024-04-26 PROCEDURE — 1090000002 HH PPS REVENUE DEBIT

## 2024-04-26 PROCEDURE — G0151 HHCP-SERV OF PT,EA 15 MIN: HCPCS | Mod: HHH

## 2024-04-26 PROCEDURE — 1090000001 HH PPS REVENUE CREDIT

## 2024-04-26 ASSESSMENT — ACTIVITIES OF DAILY LIVING (ADL)
AMBULATION ASSISTANCE ON FLAT SURFACES: 1
HOME_HEALTH_OASIS: 01
OASIS_M1830: 01
AMBULATION_DISTANCE/DURATION_TOLERATED: 150 FT

## 2024-04-26 ASSESSMENT — ENCOUNTER SYMPTOMS
PAIN LOCATION: RIGHT KNEE
PAIN LOCATION - PAIN SEVERITY: 5/10
PAIN: 1
PERSON REPORTING PAIN: PATIENT

## 2024-04-30 DIAGNOSIS — Z96.651 TOTAL KNEE REPLACEMENT STATUS, RIGHT: ICD-10-CM

## 2024-04-30 PROCEDURE — G0180 MD CERTIFICATION HHA PATIENT: HCPCS | Performed by: ORTHOPAEDIC SURGERY

## 2024-04-30 RX ORDER — OXYCODONE HYDROCHLORIDE 5 MG/1
5 TABLET ORAL EVERY 6 HOURS PRN
Qty: 28 TABLET | Refills: 0 | Status: SHIPPED | OUTPATIENT
Start: 2024-04-30 | End: 2024-05-07

## 2024-05-09 ENCOUNTER — OFFICE VISIT (OUTPATIENT)
Dept: ORTHOPEDIC SURGERY | Facility: CLINIC | Age: 74
End: 2024-05-09
Payer: MEDICARE

## 2024-05-09 ENCOUNTER — HOSPITAL ENCOUNTER (OUTPATIENT)
Dept: RADIOLOGY | Facility: CLINIC | Age: 74
Discharge: HOME | End: 2024-05-09
Payer: MEDICARE

## 2024-05-09 DIAGNOSIS — M17.11 PRIMARY OSTEOARTHRITIS OF RIGHT KNEE: ICD-10-CM

## 2024-05-09 DIAGNOSIS — G89.18 POST-OP PAIN: Primary | ICD-10-CM

## 2024-05-09 PROCEDURE — 73562 X-RAY EXAM OF KNEE 3: CPT | Mod: RIGHT SIDE | Performed by: RADIOLOGY

## 2024-05-09 PROCEDURE — 3008F BODY MASS INDEX DOCD: CPT | Performed by: ORTHOPAEDIC SURGERY

## 2024-05-09 PROCEDURE — 1157F ADVNC CARE PLAN IN RCRD: CPT | Performed by: ORTHOPAEDIC SURGERY

## 2024-05-09 PROCEDURE — 1159F MED LIST DOCD IN RCRD: CPT | Performed by: ORTHOPAEDIC SURGERY

## 2024-05-09 PROCEDURE — 73562 X-RAY EXAM OF KNEE 3: CPT | Mod: RT

## 2024-05-09 PROCEDURE — 1160F RVW MEDS BY RX/DR IN RCRD: CPT | Performed by: ORTHOPAEDIC SURGERY

## 2024-05-09 PROCEDURE — 99024 POSTOP FOLLOW-UP VISIT: CPT | Performed by: ORTHOPAEDIC SURGERY

## 2024-05-09 PROCEDURE — 1123F ACP DISCUSS/DSCN MKR DOCD: CPT | Performed by: ORTHOPAEDIC SURGERY

## 2024-05-09 RX ORDER — HYDROCODONE BITARTRATE AND ACETAMINOPHEN 5; 325 MG/1; MG/1
1 TABLET ORAL EVERY 6 HOURS PRN
Qty: 28 TABLET | Refills: 0 | Status: SHIPPED | OUTPATIENT
Start: 2024-05-09 | End: 2024-05-16

## 2024-05-09 RX ORDER — CYCLOBENZAPRINE HCL 10 MG
10 TABLET ORAL 3 TIMES DAILY PRN
Qty: 30 TABLET | Refills: 0 | Status: SHIPPED | OUTPATIENT
Start: 2024-05-09 | End: 2024-05-19

## 2024-05-09 NOTE — PROGRESS NOTES
History of Present Illness:  Patient returns today endorsing moderate pain.  Denies any numbness or tingling.  No calf pain, No chest pain or shortness of breath. She is having some pain at night and some muscle spasms.     Physical Examination:  Mild swelling  Healthy incision, no erythema or drainage, some small areas of epidermal irritation.   ROM:  0-95  + Plantar/dorsiflexion  Negative Lula test    Imaging:  Appropriate position and alignment no evidence of implant failure     Assessment  Patient status post right total knee arthroplasty, doing well    Plan:  Discussed analgesics, encouraging non-opioid modalities.  Encouraged ice, rest.  Discussed importance of ROM/ formal physical therapy.  Reviewed dental prophylaxis.  Muscle relaxer discussed.   Follow-up in 1 month for a motion check.    I have personally reviewed the OARRS report for this patient. This report is scanned into  the electronic medical record. I have considered the risks of abuse, dependence, addiction, and diversion. They currently report a pain of 8.     Jason Reinoso PA-C     In a face to face encounter, I evaluated the patient and performed a physical examination, discussed pertinent diagnostic studies if indicated and discussed diagnosis and management strategies with both the patient and physician assistant / nurse practitioner.  I reviewed the PA/NP's note and agree with the documented findings and plan of care.        Chivo Newman MD

## 2024-05-15 DIAGNOSIS — E53.8 VITAMIN B12 DEFICIENCY: Primary | ICD-10-CM

## 2024-05-15 DIAGNOSIS — F41.8 SITUATIONAL ANXIETY: ICD-10-CM

## 2024-05-15 DIAGNOSIS — F40.298 FEAR OF FALLING: Chronic | ICD-10-CM

## 2024-05-16 ENCOUNTER — TELEPHONE (OUTPATIENT)
Dept: PRIMARY CARE | Facility: CLINIC | Age: 74
End: 2024-05-16
Payer: MEDICARE

## 2024-05-16 RX ORDER — LANOLIN ALCOHOL/MO/W.PET/CERES
CREAM (GRAM) TOPICAL
Qty: 100 TABLET | Refills: 3 | Status: SHIPPED | OUTPATIENT
Start: 2024-05-16

## 2024-05-16 RX ORDER — LORAZEPAM 0.5 MG/1
0.5 TABLET ORAL DAILY PRN
Qty: 60 TABLET | Refills: 0 | OUTPATIENT
Start: 2024-05-16 | End: 2024-08-14

## 2024-05-16 NOTE — TELEPHONE ENCOUNTER
Called and spoke with the patient  She does not need an Ativan refill yet-she will use it sparingly and not with the pain medicine  Physical therapy continues she will continue to work with her orthopedic team as well  She will do labs before her mid July appointment to check her anemia status.  She will call sooner with concerns

## 2024-05-21 ENCOUNTER — OFFICE VISIT (OUTPATIENT)
Dept: ORTHOPEDIC SURGERY | Facility: CLINIC | Age: 74
End: 2024-05-21
Payer: MEDICARE

## 2024-05-21 DIAGNOSIS — G89.18 POST-OP PAIN: Primary | ICD-10-CM

## 2024-05-21 PROCEDURE — 3008F BODY MASS INDEX DOCD: CPT | Performed by: STUDENT IN AN ORGANIZED HEALTH CARE EDUCATION/TRAINING PROGRAM

## 2024-05-21 PROCEDURE — 1160F RVW MEDS BY RX/DR IN RCRD: CPT | Performed by: STUDENT IN AN ORGANIZED HEALTH CARE EDUCATION/TRAINING PROGRAM

## 2024-05-21 PROCEDURE — 1123F ACP DISCUSS/DSCN MKR DOCD: CPT | Performed by: STUDENT IN AN ORGANIZED HEALTH CARE EDUCATION/TRAINING PROGRAM

## 2024-05-21 PROCEDURE — 1036F TOBACCO NON-USER: CPT | Performed by: STUDENT IN AN ORGANIZED HEALTH CARE EDUCATION/TRAINING PROGRAM

## 2024-05-21 PROCEDURE — 99024 POSTOP FOLLOW-UP VISIT: CPT | Performed by: STUDENT IN AN ORGANIZED HEALTH CARE EDUCATION/TRAINING PROGRAM

## 2024-05-21 PROCEDURE — 1159F MED LIST DOCD IN RCRD: CPT | Performed by: STUDENT IN AN ORGANIZED HEALTH CARE EDUCATION/TRAINING PROGRAM

## 2024-05-21 PROCEDURE — 1157F ADVNC CARE PLAN IN RCRD: CPT | Performed by: STUDENT IN AN ORGANIZED HEALTH CARE EDUCATION/TRAINING PROGRAM

## 2024-05-21 RX ORDER — CEPHALEXIN 500 MG/1
500 CAPSULE ORAL 4 TIMES DAILY
Qty: 40 CAPSULE | Refills: 0 | Status: SHIPPED | OUTPATIENT
Start: 2024-05-21 | End: 2024-05-31

## 2024-05-21 RX ORDER — SULFAMETHOXAZOLE AND TRIMETHOPRIM 800; 160 MG/1; MG/1
1 TABLET ORAL 2 TIMES DAILY
Qty: 20 TABLET | Refills: 0 | Status: SHIPPED | OUTPATIENT
Start: 2024-05-21 | End: 2024-05-31

## 2024-05-21 NOTE — PROGRESS NOTES
History of Present Illness:  Patient returns today endorsing mild pain.  Patient notes improving functional status.  She notes that over the past several days she is experiencing increasing redness over the midportion of the incision with a slight raised area about 2-1/2 cm in diameter circumferential.  There is no obvious drainage noted.  There is a small area about 1 cm x 1-1/2 cm just lateral to the area of more significant erythema that she notes is related to excoriation.  She is still working with physical therapy and has achieved about 100 degrees of motion to flexion.    Physical Examination:  Incision appears well-healing, there is a small area of erythema noted at midportion of the incision measuring approximately 2-1/2 cm in diameter circumferentially without evidence of draining.  Small area of excoriation with eschar noted just lateral to this measuring about 1-1/2 x 1 cm.  ROM: 0-100  No laxity to varus / valgus stress  + Plantar/dorsiflexion  Negative Lula test    Assessment:  Patient status post right total knee arthroplasty, small area of erythema likely early processes of suture granuloma.    Plan:  Discussed analgesics.  Reviewed antibiotic regimen with the patient including Bactrim and Keflex as well as proper incision care and hygiene.  Reviewed range of motion, strength goals.  Discussed activities to avoid  Dental prophylaxis discussed.  Follow-up:  10 days, soon after antibiotic regimen.  We reviewed the patient that if she does not improve or notices a substantial worsening in her condition, we would recommend follow-up soon as possible to discuss treatment options.    Jason Reinoso PA-C

## 2024-06-04 ENCOUNTER — OFFICE VISIT (OUTPATIENT)
Dept: ORTHOPEDIC SURGERY | Facility: CLINIC | Age: 74
End: 2024-06-04
Payer: MEDICARE

## 2024-06-04 DIAGNOSIS — M17.11 PRIMARY OSTEOARTHRITIS OF RIGHT KNEE: Primary | ICD-10-CM

## 2024-06-04 PROCEDURE — 1036F TOBACCO NON-USER: CPT | Performed by: ORTHOPAEDIC SURGERY

## 2024-06-04 PROCEDURE — 1123F ACP DISCUSS/DSCN MKR DOCD: CPT | Performed by: ORTHOPAEDIC SURGERY

## 2024-06-04 PROCEDURE — 1159F MED LIST DOCD IN RCRD: CPT | Performed by: ORTHOPAEDIC SURGERY

## 2024-06-04 PROCEDURE — 99024 POSTOP FOLLOW-UP VISIT: CPT | Performed by: ORTHOPAEDIC SURGERY

## 2024-06-04 PROCEDURE — 3008F BODY MASS INDEX DOCD: CPT | Performed by: ORTHOPAEDIC SURGERY

## 2024-06-04 PROCEDURE — 1157F ADVNC CARE PLAN IN RCRD: CPT | Performed by: ORTHOPAEDIC SURGERY

## 2024-06-04 NOTE — PROGRESS NOTES
History of Present Illness:  Patient returns today endorsing mild pain.  Patient notes improving functional status.    Physical Examination:  Well healed incision with small areas of scab, no erythema   ROM: 0-120+  No laxity to varus / valgus stress  + Plantar/dorsiflexion  Negative Lula test    Assessment:  Patient status post right total knee arthroplasty, doing well    Plan:  Discussed analgesics.  Reviewed range of motion, strength goals.  Discussed activities to avoid  Dental prophylaxis discussed.  Follow-up:  3 weeks

## 2024-06-05 ENCOUNTER — LAB (OUTPATIENT)
Dept: LAB | Facility: LAB | Age: 74
End: 2024-06-05
Payer: MEDICARE

## 2024-06-05 ENCOUNTER — TELEPHONE (OUTPATIENT)
Dept: PRIMARY CARE | Facility: CLINIC | Age: 74
End: 2024-06-05

## 2024-06-05 DIAGNOSIS — D64.9 MILD ANEMIA: Chronic | ICD-10-CM

## 2024-06-05 LAB
ANION GAP SERPL CALC-SCNC: 15 MMOL/L (ref 10–20)
BUN SERPL-MCNC: 7 MG/DL (ref 6–23)
CALCIUM SERPL-MCNC: 9.5 MG/DL (ref 8.6–10.3)
CHLORIDE SERPL-SCNC: 89 MMOL/L (ref 98–107)
CO2 SERPL-SCNC: 26 MMOL/L (ref 21–32)
CREAT SERPL-MCNC: 0.72 MG/DL (ref 0.5–1.05)
EGFRCR SERPLBLD CKD-EPI 2021: 90 ML/MIN/1.73M*2
ERYTHROCYTE [DISTWIDTH] IN BLOOD BY AUTOMATED COUNT: 12.2 % (ref 11.5–14.5)
FOLATE SERPL-MCNC: >22.3 NG/ML
GLUCOSE SERPL-MCNC: 117 MG/DL (ref 74–99)
HCT VFR BLD AUTO: 37.5 % (ref 36–46)
HGB BLD-MCNC: 12.9 G/DL (ref 12–16)
IRON SATN MFR SERPL: 49 % (ref 25–45)
IRON SERPL-MCNC: 178 UG/DL (ref 35–150)
MCH RBC QN AUTO: 35.2 PG (ref 26–34)
MCHC RBC AUTO-ENTMCNC: 34.4 G/DL (ref 32–36)
MCV RBC AUTO: 103 FL (ref 80–100)
NRBC BLD-RTO: 0 /100 WBCS (ref 0–0)
PLATELET # BLD AUTO: 271 X10*3/UL (ref 150–450)
POTASSIUM SERPL-SCNC: 5 MMOL/L (ref 3.5–5.3)
RBC # BLD AUTO: 3.66 X10*6/UL (ref 4–5.2)
SODIUM SERPL-SCNC: 125 MMOL/L (ref 136–145)
TIBC SERPL-MCNC: 366 UG/DL (ref 240–445)
UIBC SERPL-MCNC: 188 UG/DL (ref 110–370)
VIT B12 SERPL-MCNC: 857 PG/ML (ref 211–911)
WBC # BLD AUTO: 9.4 X10*3/UL (ref 4.4–11.3)

## 2024-06-05 PROCEDURE — 80048 BASIC METABOLIC PNL TOTAL CA: CPT

## 2024-06-05 PROCEDURE — 83540 ASSAY OF IRON: CPT

## 2024-06-05 PROCEDURE — 83550 IRON BINDING TEST: CPT

## 2024-06-05 PROCEDURE — 36415 COLL VENOUS BLD VENIPUNCTURE: CPT

## 2024-06-05 PROCEDURE — 85027 COMPLETE CBC AUTOMATED: CPT

## 2024-06-05 PROCEDURE — 82607 VITAMIN B-12: CPT

## 2024-06-05 PROCEDURE — 82746 ASSAY OF FOLIC ACID SERUM: CPT

## 2024-06-06 ENCOUNTER — APPOINTMENT (OUTPATIENT)
Dept: ORTHOPEDIC SURGERY | Facility: CLINIC | Age: 74
End: 2024-06-06
Payer: MEDICARE

## 2024-06-06 NOTE — RESULT ENCOUNTER NOTE
Rosaline    Thanks for doing the labs.  I am glad that there is really no signs of anemia.  The iron levels look sufficient.  There is no signs of iron deficiency.  Nor is there any signs of B12 or folate deficiency.    The kidney labs look fine.    The main concern is the sodium is low again.  I will call you tomorrow to review this in further detail.    Once again thanks for doing the labs.    Sincerely,  Bhavik Tristan MD

## 2024-06-07 DIAGNOSIS — E87.1 HYPONATREMIA: Primary | ICD-10-CM

## 2024-06-07 NOTE — TELEPHONE ENCOUNTER
Spoke with patient regarding her hyponatremia.  She states she drinks a lot of water.  I encouraged her to cut back at least 2 L but on further discussion, she needs to drink at least half of what she is drinking right now.  She will reassess the sodium with nonfasting labs in 7 days    She notes fatigue.  She feels she is sleeping well.  Labs show no significant anemia.  She is continue with rehab from her knee replacement.  She feels like she is getting stronger and better from that standpoint.  She will continue therapy she will advance activity as tolerated and call with additional concerns.

## 2024-06-13 ENCOUNTER — APPOINTMENT (OUTPATIENT)
Dept: ORTHOPEDIC SURGERY | Facility: CLINIC | Age: 74
End: 2024-06-13
Payer: MEDICARE

## 2024-06-20 ENCOUNTER — LAB (OUTPATIENT)
Dept: LAB | Facility: LAB | Age: 74
End: 2024-06-20
Payer: MEDICARE

## 2024-06-20 DIAGNOSIS — E87.1 HYPONATREMIA: ICD-10-CM

## 2024-06-20 LAB
ANION GAP SERPL CALC-SCNC: 17 MMOL/L (ref 10–20)
BUN SERPL-MCNC: 8 MG/DL (ref 6–23)
CALCIUM SERPL-MCNC: 9.4 MG/DL (ref 8.6–10.3)
CHLORIDE SERPL-SCNC: 93 MMOL/L (ref 98–107)
CO2 SERPL-SCNC: 25 MMOL/L (ref 21–32)
CREAT SERPL-MCNC: 0.71 MG/DL (ref 0.5–1.05)
EGFRCR SERPLBLD CKD-EPI 2021: >90 ML/MIN/1.73M*2
GLUCOSE SERPL-MCNC: 106 MG/DL (ref 74–99)
POTASSIUM SERPL-SCNC: 4.5 MMOL/L (ref 3.5–5.3)
SODIUM SERPL-SCNC: 130 MMOL/L (ref 136–145)

## 2024-06-20 PROCEDURE — 80048 BASIC METABOLIC PNL TOTAL CA: CPT

## 2024-06-20 PROCEDURE — 36415 COLL VENOUS BLD VENIPUNCTURE: CPT

## 2024-06-24 ENCOUNTER — APPOINTMENT (OUTPATIENT)
Dept: PRIMARY CARE | Facility: CLINIC | Age: 74
End: 2024-06-24
Payer: MEDICARE

## 2024-07-11 ENCOUNTER — APPOINTMENT (OUTPATIENT)
Dept: ORTHOPEDIC SURGERY | Facility: CLINIC | Age: 74
End: 2024-07-11
Payer: MEDICARE

## 2024-07-11 ENCOUNTER — APPOINTMENT (OUTPATIENT)
Dept: PRIMARY CARE | Facility: CLINIC | Age: 74
End: 2024-07-11
Payer: MEDICARE

## 2024-07-11 VITALS
OXYGEN SATURATION: 95 % | HEART RATE: 88 BPM | BODY MASS INDEX: 33.66 KG/M2 | SYSTOLIC BLOOD PRESSURE: 128 MMHG | WEIGHT: 190 LBS | HEIGHT: 63 IN | DIASTOLIC BLOOD PRESSURE: 82 MMHG | TEMPERATURE: 98.2 F

## 2024-07-11 DIAGNOSIS — Z78.9: ICD-10-CM

## 2024-07-11 DIAGNOSIS — Z96.659 AFTERCARE FOLLOWING KNEE JOINT REPLACEMENT SURGERY, UNSPECIFIED LATERALITY: ICD-10-CM

## 2024-07-11 DIAGNOSIS — E87.1 HYPONATREMIA: ICD-10-CM

## 2024-07-11 DIAGNOSIS — Z96.651 TOTAL KNEE REPLACEMENT STATUS, RIGHT: ICD-10-CM

## 2024-07-11 DIAGNOSIS — Z85.828 HISTORY OF SKIN CANCER: ICD-10-CM

## 2024-07-11 DIAGNOSIS — E66.9 CLASS 1 OBESITY WITH SERIOUS COMORBIDITY AND BODY MASS INDEX (BMI) OF 33.0 TO 33.9 IN ADULT, UNSPECIFIED OBESITY TYPE: ICD-10-CM

## 2024-07-11 DIAGNOSIS — I10 BENIGN ESSENTIAL HYPERTENSION: Primary | ICD-10-CM

## 2024-07-11 DIAGNOSIS — E53.8 VITAMIN B12 DEFICIENCY: ICD-10-CM

## 2024-07-11 DIAGNOSIS — E78.5 DYSLIPIDEMIA, GOAL LDL BELOW 100: ICD-10-CM

## 2024-07-11 DIAGNOSIS — G89.18 POST-OP PAIN: Primary | ICD-10-CM

## 2024-07-11 DIAGNOSIS — F40.298 FEAR OF FALLING: Chronic | ICD-10-CM

## 2024-07-11 DIAGNOSIS — E03.9 ACQUIRED HYPOTHYROIDISM: ICD-10-CM

## 2024-07-11 DIAGNOSIS — F41.8 SITUATIONAL ANXIETY: ICD-10-CM

## 2024-07-11 DIAGNOSIS — Z23 IMMUNIZATION DUE: ICD-10-CM

## 2024-07-11 DIAGNOSIS — Z47.1 AFTERCARE FOLLOWING KNEE JOINT REPLACEMENT SURGERY, UNSPECIFIED LATERALITY: ICD-10-CM

## 2024-07-11 DIAGNOSIS — Z00.00 ROUTINE GENERAL MEDICAL EXAMINATION AT HEALTH CARE FACILITY: ICD-10-CM

## 2024-07-11 DIAGNOSIS — Z71.85 IMMUNIZATION COUNSELING: ICD-10-CM

## 2024-07-11 DIAGNOSIS — R73.03 PREDIABETES: ICD-10-CM

## 2024-07-11 DIAGNOSIS — J30.1 SEASONAL ALLERGIC RHINITIS DUE TO POLLEN: ICD-10-CM

## 2024-07-11 PROCEDURE — 3079F DIAST BP 80-89 MM HG: CPT | Performed by: INTERNAL MEDICINE

## 2024-07-11 PROCEDURE — 1157F ADVNC CARE PLAN IN RCRD: CPT | Performed by: STUDENT IN AN ORGANIZED HEALTH CARE EDUCATION/TRAINING PROGRAM

## 2024-07-11 PROCEDURE — 1123F ACP DISCUSS/DSCN MKR DOCD: CPT | Performed by: INTERNAL MEDICINE

## 2024-07-11 PROCEDURE — 1036F TOBACCO NON-USER: CPT | Performed by: INTERNAL MEDICINE

## 2024-07-11 PROCEDURE — 3074F SYST BP LT 130 MM HG: CPT | Performed by: INTERNAL MEDICINE

## 2024-07-11 PROCEDURE — 3008F BODY MASS INDEX DOCD: CPT | Performed by: STUDENT IN AN ORGANIZED HEALTH CARE EDUCATION/TRAINING PROGRAM

## 2024-07-11 PROCEDURE — 1170F FXNL STATUS ASSESSED: CPT | Performed by: INTERNAL MEDICINE

## 2024-07-11 PROCEDURE — 99214 OFFICE O/P EST MOD 30 MIN: CPT | Performed by: INTERNAL MEDICINE

## 2024-07-11 PROCEDURE — 1160F RVW MEDS BY RX/DR IN RCRD: CPT | Performed by: INTERNAL MEDICINE

## 2024-07-11 PROCEDURE — 99024 POSTOP FOLLOW-UP VISIT: CPT | Performed by: STUDENT IN AN ORGANIZED HEALTH CARE EDUCATION/TRAINING PROGRAM

## 2024-07-11 PROCEDURE — 3008F BODY MASS INDEX DOCD: CPT | Performed by: INTERNAL MEDICINE

## 2024-07-11 PROCEDURE — 1159F MED LIST DOCD IN RCRD: CPT | Performed by: INTERNAL MEDICINE

## 2024-07-11 PROCEDURE — 1157F ADVNC CARE PLAN IN RCRD: CPT | Performed by: INTERNAL MEDICINE

## 2024-07-11 PROCEDURE — G0439 PPPS, SUBSEQ VISIT: HCPCS | Performed by: INTERNAL MEDICINE

## 2024-07-11 PROCEDURE — 1123F ACP DISCUSS/DSCN MKR DOCD: CPT | Performed by: STUDENT IN AN ORGANIZED HEALTH CARE EDUCATION/TRAINING PROGRAM

## 2024-07-11 RX ORDER — LORAZEPAM 0.5 MG/1
0.5 TABLET ORAL DAILY PRN
Qty: 60 TABLET | Refills: 0 | Status: SHIPPED | OUTPATIENT
Start: 2024-07-11 | End: 2024-10-09

## 2024-07-11 ASSESSMENT — ACTIVITIES OF DAILY LIVING (ADL)
GROOMING: INDEPENDENT
TOILETING: INDEPENDENT
GROCERY_SHOPPING: INDEPENDENT
MANAGING_FINANCES: INDEPENDENT
DRESSING: INDEPENDENT
BATHING: INDEPENDENT
ASSISTIVE_DEVICE: EYEGLASSES;CANE
DOING_HOUSEWORK: INDEPENDENT
PATIENT'S MEMORY ADEQUATE TO SAFELY COMPLETE DAILY ACTIVITIES?: YES
ADEQUATE_TO_COMPLETE_ADL: YES
JUDGMENT_ADEQUATE_SAFELY_COMPLETE_DAILY_ACTIVITIES: YES
TAKING_MEDICATION: INDEPENDENT
WALKS IN HOME: INDEPENDENT

## 2024-07-11 ASSESSMENT — ENCOUNTER SYMPTOMS
DEPRESSION: 0
LOSS OF SENSATION IN FEET: 0
OCCASIONAL FEELINGS OF UNSTEADINESS: 1

## 2024-07-11 NOTE — PROGRESS NOTES
History of Present Illness:  Patient returns today endorsing minimal pain.  Patient notes improving functional status. She does still have some strength loss that she is working on.     Physical Examination:  Well healed incision   ROM: 0-125  No laxity to varus / valgus stress  + Plantar/dorsiflexion  Negative Lula test    Assessment:  Patient status post right total knee arthroplasty, doing well    Plan:  Discussed improvement in strength, swelling over the course of the first year post op.  Discussed return to activities and activities to avoid. Would continue strengthening with PT and home exercises.   Dental prophylaxis discussed.  Follow-up:  2-3 months     Jason Reinoso PA-C

## 2024-07-11 NOTE — PROGRESS NOTES
"Subjective   Reason for Visit: Yamilex Ye is an 73 y.o. female here for a Medicare Wellness visit.     Past Medical, Surgical, and Family History reviewed and updated in chart.         Here for follow-up and wellness visit  Doing better with her knee.  She hopes to get back to swimming soon.  She has a new dog-that she is walking.  This has been a great source of enjoyment        Patient Care Team:  Bhavik Tristan MD as PCP - General  Bhavik Tristan MD as PCP - [facility] Attributed Provider  Jameson Strickland DO as Surgeon (Gastroenterology)     Review of Systems    Objective   Vitals:  /82   Pulse 88   Temp 36.8 °C (98.2 °F)   Ht 1.607 m (5' 3.25\")   Wt 86.2 kg (190 lb)   SpO2 95%   BMI 33.39 kg/m²       Physical Exam  Vitals reviewed.   Constitutional:       Appearance: Normal appearance.   HENT:      Head: Normocephalic and atraumatic.   Eyes:      General: No scleral icterus.        Right eye: No discharge.         Left eye: No discharge.      Extraocular Movements: Extraocular movements intact.      Conjunctiva/sclera: Conjunctivae normal.      Pupils: Pupils are equal, round, and reactive to light.   Cardiovascular:      Rate and Rhythm: Normal rate and regular rhythm.      Pulses: Normal pulses.      Heart sounds: Normal heart sounds. No murmur heard.  Pulmonary:      Effort: Pulmonary effort is normal.      Breath sounds: Normal breath sounds. No wheezing or rhonchi.   Musculoskeletal:         General: No deformity or signs of injury. Normal range of motion.      Cervical back: Normal range of motion and neck supple. No rigidity or tenderness.      Comments: Right knee incision well-healed.  She did have difficulty and was not able to get up and onto the exam table   Lymphadenopathy:      Cervical: No cervical adenopathy.   Skin:     General: Skin is warm and dry.      Findings: No rash.   Neurological:      General: No focal deficit present.      Mental Status: She is alert and oriented " to person, place, and time. Mental status is at baseline.      Cranial Nerves: No cranial nerve deficit.      Sensory: No sensory deficit.      Gait: Gait normal.   Psychiatric:         Mood and Affect: Mood normal.         Behavior: Behavior normal.         Thought Content: Thought content normal.         Judgment: Judgment normal.         Assessment/Plan   Problem List Items Addressed This Visit       Patient has living will    Allergic rhinitis    Dyslipidemia, goal LDL below 100    Relevant Orders    TSH with reflex to Free T4 if abnormal    Lipid Panel    Alanine Aminotransferase    Hypothyroidism    Situational anxiety    Vitamin B12 deficiency    Hyponatremia    Benign essential hypertension - Primary    Relevant Orders    Basic Metabolic Panel    History of skin cancer    Fear of falling    Relevant Medications    LORazepam (Ativan) 0.5 mg tablet    Prediabetes    Relevant Orders    Hemoglobin A1C    Total knee replacement status, right     Other Visit Diagnoses       Class 1 obesity with serious comorbidity and body mass index (BMI) of 33.0 to 33.9 in adult, unspecified obesity type        Routine general medical examination at health care facility        Relevant Orders    1 Year Follow Up [facility] - PCP - Wellness Exam    Immunization counseling        Immunization due              Lab on 06/20/2024   Component Date Value Ref Range Status    Glucose 06/20/2024 106 (H)  74 - 99 mg/dL Final    Sodium 06/20/2024 130 (L)  136 - 145 mmol/L Final    Potassium 06/20/2024 4.5  3.5 - 5.3 mmol/L Final    Chloride 06/20/2024 93 (L)  98 - 107 mmol/L Final    Bicarbonate 06/20/2024 25  21 - 32 mmol/L Final    Anion Gap 06/20/2024 17  10 - 20 mmol/L Final    Urea Nitrogen 06/20/2024 8  6 - 23 mg/dL Final    Creatinine 06/20/2024 0.71  0.50 - 1.05 mg/dL Final    eGFR 06/20/2024 >90  >60 mL/min/1.73m*2 Final    Calculations of estimated GFR are performed using the 2021 CKD-EPI Study Refit equation without  the race variable for the IDMS-Traceable creatinine methods.  https://jasn.asnjournals.org/content/early//ASN.8275157256    Calcium 2024 9.4  8.6 - 10.3 mg/dL Final          Portions of this encounter note have been copied from my previous note dated  3/27/24 , which have been updated where appropriate and all reflect my current medical decision making from today.     Benzodiazepines:  What is the patient's goal of therapy?   Improve anxiety  Is this being achieved with current treatment? Yes, with medication      CSA: 2024  PDMP: 2024  UDS:  23   ADRIANE-7:   23  (score =3)    Activities of Daily Living:   Is your overall impression that this patient is benefiting (symptom reduction outweighs side effects) from benzodiazepine therapy? Yes     1. Physical Functioning: Better  2. Family Relationship: Better  3. Social Relationship: Better  4. Mood: Better  5. Sleep Patterns: Better  6. Overall Function: Better    Situational anxiety/caregiver stress/ depression-very difficult for several years with her 's dementia declining course and subsequent passing. Support provided. She will continue the additional Lexapro as ordered. Her   mid 2022. She was advised to start spending time around people by her therapist.. She plans to spend a bit more time with friends at the [employer]. Unfortunately with her knee she cannot do [place] dancing that but at some point might.           Doing better, but lonely often. Hard to meet companions.  She no longer sees her counselor. Support provided.  She does feel lonely.  She does not want to do anything in medical she states.  Suggested volunteering with people as she enjoys being around people.            3/24-it has been a challenging winter.  First she had multiple problems with her 3 facial Moh's procedures within 4 months since December, now she has to have her knee replaced, her family has been supportive she is  looking forward to [facility] but it has been challenging alone since her          Living situation-she is -lives alone with her dog Todd.  She lives in a one-story home.  Her 2 daughters live in town    S/p      Right Total knee replacement  24 w/  Dr. Newman.  Doing much better.  She went through outpatient physical therapy with her therapist, Presley            -she has been cleared to get back into the pool.  She has been walking her new dog more as well      Hypertension-she will continue lisinopril            BP improved on recheck.     Hyponatremia- mild; will follow             -sodium 127-a bit down from 131 in May 2023    Dyslipidemia-tolerating atorvastatin.  Goal LDL under 100             -.  For now we will continue    Prediabetes/elevated weight            -fasting blood sugar 124, A1c 5.8%.  BMI 34.4 she will meet with our pharmacy team to consider one of the newer medications     Elevated 10 year cardiac risk assessment- 10 year cardiac risk assessment at 9.9% 2019 reduces to 7.4% taking statin, 5.4% with statin and blood pressure Rx. She will continue lisinopril and atorvastatin. She will continue exercise when her schedule permits     Exercise routine-she will advance as tolerated.              She has been using a - [employer], and hopes to restart Silver Sneakers when she is able tolerate it.  In the past-encouraged water walking 3 x wk at [facility]'s pool              Exercise on hold with her right knee injury summer 2023    Lymphedema- occasional while flying. Uses compression hoses. Not problematic at this time.      Hypothyroidism-we'll continue to follow thyroid labs with present thyroid supplement prescription.  TSH normal     Mild anemia-she will continue her iron and B12 and will reassess labs before follow-up            Improved on recheck- likely a postop issue after her knee replacement    Left knee  injury-November 2022-she has worked with Dr. Israel who did an MRI which shows nondisplaced tibial fracture at the insertion of the PCL, possibly a meniscal tear as well as patellar arthritis. She is doing physical therapy. Presently using a cane. She will continue caution. Handicap parking placard provided January 2023               IBS / Recurrent diarrhea alternating with constipation- initially diagnosed as colitis- noted on early 2020 colonoscopy per Dr. Ibarra. Lexapro was discontinued. She has used Lomotil as needed from Dr. Ibarra. Colonoscopy updated January 2021- significantly improved she states.  With less stress, there is less diarrhea she notes. Overall improved she still uses the Lomotil occasionally. Encouraged dietary caution with her upcoming plans to visit a [employer] restaurant.                 She saw Dr. Strickland early May '23. He rec'd more metamucil - now on 2 cap/day. Colonoscopy planned for 2024. Mornings still challenging w/ am diarrhea.  She uses Lomotil-she will get refills from him.  Anticipates a colonoscopy in the spring 2024              She continues Lomotil as needed and we will see him in 3/24    Acid reflux-stable with PPI- which she will continue especially as she is on the naproxen           Anticipate EGD in 2024.  Smaller meals help     Cholelithiasis- asymptomatic     Family history of colon cancer-she will continue colonoscopy care as below-at least every 5 years.    Colonoscopy updated January 2021. Next in 3 years-January 2024            She met w/ Dr. Strickland 3/24; she'll see him back at 6 mo, 9/24.  For now colonoscopy on hold    Lumbar spasms-mainly in the SI joint area-improved after working with her physical therapist, Presley for exercises    S/p lumbar surgery 4/17/19 per Dr. Ward for L 5 cyst--then her second lumbar surgery July 2020 laminectomy. Ongoing pain has been challenging. She will continue exercises, and follow-up with her physical therapist Presley as  needed. She will see Dr. Ward as well. She will continue stretching.   X-rays completed per Dr. Ward. Unremarkable.        History of Right lower quadrant abdominal wall hernia-surgery w/ Dr. Ravindra Louise, and Dr. Finnegan at [facility] Feb. 18. Improved        Gynecologic care / history of vaginal hysterectomy she will follow-up with GYN as needed     Annual mammogram- updated January 2024     Vitamin D deficiency- encouraged patient to continue vitamin D daily as ordered. Will consider vitamin D level regularly.     Vitamin B12 deficiency- improved on 01/23 labs.             B 12 still high in 5/23 - she'll reduce B 12 to every other day     Seasonal allergies-she will use Zyrtec seasonally as needed        History of skin cancer/rosacea-  history of Mohs surgery, she understands importance of sunscreen   She continues to follow at [facility]-now working with Dr. Connie Kelly who does laser treatments on her cheeks mainly presently.             12/23-she has had Mohs surgery on her chin area which has been complicated by dehiscence requiring debridement.  She will continue to work with the dermatology department at [facility].  She states that she has a Mohs procedure between her eyebrows in January 2024              3/24; s/p 3 Mohs on face since 12/23 per Sarahi Tabor MD at [facility]. Follow up soon, w/ facial derm abraision soon      Ophthalmology care-she will see Dr. Cade as scheduled. Sadly her right eye has chronically poor vision from a remote retinal vein occlusion. The left eye has dry eyes and she may need a keratotomy and contact lens implanted permanently.  Right cataract surgery completed 12/22, with noticeably better vision. Left scheduled for 2/23. Left blepharoplasty  3/23 per Dr. Lloyd.             Vision improved and she is pleased with the results at this. She will continue with Dr. Cade- now at [facility]     Hearing concerns- audiology evaluation with  audiology and ENT completed 12/22.      Flu shot each fall- updated 8/23     Covid series-completed. Covid Booster completed. Additional booster shot updated 9/22.  Following her COVID illness mid October, she will get a COVID booster mid January    RSV vaccination suggested-suggested 7/24     Shingrix series completed    Tdap before 2024-suggested 7/24     Follow-up in 3 months, sooner with concerns     Charting was completed using voice recognition technology and may include unintended errors.

## 2024-07-12 PROBLEM — R73.09 ELEVATED GLUCOSE: Status: RESOLVED | Noted: 2023-05-17 | Resolved: 2024-07-12

## 2024-07-12 PROBLEM — M79.609 PAIN IN EXTREMITY: Status: RESOLVED | Noted: 2019-12-18 | Resolved: 2024-07-12

## 2024-07-12 PROBLEM — Z96.659 AFTERCARE FOLLOWING KNEE JOINT REPLACEMENT SURGERY: Status: RESOLVED | Noted: 2024-07-11 | Resolved: 2024-07-12

## 2024-07-12 PROBLEM — E66.812 CLASS 2 SEVERE OBESITY WITH SERIOUS COMORBIDITY AND BODY MASS INDEX (BMI) OF 35.0 TO 35.9 IN ADULT: Status: RESOLVED | Noted: 2024-03-28 | Resolved: 2024-07-12

## 2024-07-12 PROBLEM — Z47.1 AFTERCARE FOLLOWING KNEE JOINT REPLACEMENT SURGERY: Status: RESOLVED | Noted: 2024-07-11 | Resolved: 2024-07-12

## 2024-07-12 PROBLEM — M17.11 UNILATERAL PRIMARY OSTEOARTHRITIS, RIGHT KNEE: Status: RESOLVED | Noted: 2024-03-01 | Resolved: 2024-07-12

## 2024-07-12 PROBLEM — R03.0 ELEVATED BLOOD PRESSURE READING IN OFFICE WITHOUT DIAGNOSIS OF HYPERTENSION: Status: RESOLVED | Noted: 2023-09-19 | Resolved: 2024-07-12

## 2024-07-12 PROBLEM — E66.01 CLASS 2 SEVERE OBESITY WITH SERIOUS COMORBIDITY AND BODY MASS INDEX (BMI) OF 35.0 TO 35.9 IN ADULT (MULTI): Status: RESOLVED | Noted: 2024-03-28 | Resolved: 2024-07-12

## 2024-08-05 NOTE — PROGRESS NOTES
"CC: Follow-up.    History of Present Illness:   Yamilex Chan is a 74yo female with Sjogren's disease, nonspecific colitis, HTN, HLD, hypothyroidism who presents to clinic for follow-up of IBS. Patient has both diarrhea and constipation - but she is taking Lomotil daily (multiple tabs on most days). Patient is taking 2-4 fiber capsules. Patient is on a probiotic. No pop, juice. Some dairy products. Drinks 1-2 glasses of wine daily. Patient recently got her knee done. In PT still, + working out. Socializing and going out.      Colonoscopy 2021: Normal TI, diverticulosis focal active colitis in the ascending colon, otherwise biopsies are normal.  .  GI visit 3/2024: \"Patient states that she has not been doing well.  She has had increased bloating, urgency, diarrhea.  She has also been going through a lot of other medical issues including needing a knee replacement and multiple bouts of Mohs surgery.  Her knee issue has led to decreased activity and increased depression/anxiety.  She has not been able to work out or walk or get outside.  She is taking 4 Lomotil daily.  She takes fiber on occasion which seems to help her symptoms.  No other GI complaints at this time.  Last colonoscopy in 2021.\"    GI visit 9/2023: \"Patient states that she is doing fairly well. She still gets occasional diarrhea. She takes 1 Lomotil daily. Although, I think this is mostly out of fear of having an accident. She states the bloating has improved with fiber supplement. She is still working out with a . She is no longer following with the psychologist. No other concerns at this time.\"        GI visit 5/2023: \"Patient is again doing fairly well. She still has complaints of bloating, diarrhea, urgency, and nausea. + Abdominal fullness, early satiety. She admits that certain food items exacerbate her symptoms (especially the urgency). Seems to be that dairy plays a role. She takes Lomotil up to 4 tabs daily. She usually is doing this " "prior to workouts or going out for the day. She takes Gas-X for bloating. Zofran a couple times per month for nausea. I question whether there is constipation with overflow diarrhea. She is pretty happy with her current regimen. +weight gain. No blood in her stool. Has been going out with her daughter and drinking wine on occasion. Went to a wedding in [place].\"     GI visit 11/2022: \"Patient doing fairly well. Still gets occasional bloating.  passed away a few months back. She is doing better and becoming more active. No other concerns at this time.\"      GI visit 7/2022: \"Patient is doing well. She has had no diarrhea episodes since our last visit. She will occasionally get constipation with her Lomotil use. She takes 2 pills on most days. She will skip weekends and other days where she is not doing much. She mostly takes it out of fear of having an episode while out and about. Otherwise, no new issues. She was alerted by her 's hospice nurse that he likely only has a few weeks left to live. She is set to go to [place] tomorrow with family to visit her son. Seeing a psychologist which has helped out significantly. She is on Ativan which is helping.\"     GI visit 5/2022: \"Patient states she is not doing well. She is tangential and + pressured speech. As per below, we thought that her IBS with alternating bowel habits was likely more constipation related based on the KUB. We stopped the mesalamine as she was clearly still having symptoms while on it. We also attempted to start her on MiraLAX to clear out her bowels and reset the system. However, she only gave the MiraLAX several days and then went back to taking Lomotil and mesalamine as needed. It sounds as if she takes the Lomotil quite frequently. She will also take the MiraLAX as needed. She is also complaining of increased bloating and flatulence. She tried the low FODMAP diet but became very stressed out by it. Her sister is a nutritionist and " "agrees that it is a very difficult diet to follow. She still is having significant life stressors. No pop use. Limited dairy use (cheese). She is working with a . She states that she has lost 20 or so pounds in the last year and contributes this mostly to her work with a . However, she does admit to poor appetite and anorexia related to fear of eating (diarrhea, accident). No other issues at this time.\"     Per telephone call 2/23/2022: \"Patient underwent KUB to check fecal loading. KUB shows stool throughout the colon. I suspect the patient does not have microscopic colitis and actually suffers from IBS-M. Mesalamine is likely of little clinical utility. Furthermore, it is not necessarily better than placebo in microscopic colitis. Based on cost and my clinical suspicion, we will stop the mesalamine. We will also start MiraLAX and titrate as needed. We discussed the ups and downs associated with IBS. Patient is going to work on stress reduction and is scheduled to meet with a psychologist in April. She is going to message me in 3 to 4 weeks to let me know how she is doing.\"     GI Visit 2/2022: \"Yamilex Chan is a 72yo female with Sjogren's disease, nonspecific colitis (on mesalamine), HTN, HLD, hypothyroidism who presents to clinic for follow-up of nonspecific colitis. She is a former patient of Dr. Herrmann. She has had multiple colonoscopies that showed nonspecific colitis on biopsies. First colonoscopy was in 2020 with random biopsies showing nonspecific colitis, consider medication versus infection. Her next colonoscopy was in 2021 and showed nonspecific colitis in the ascending colon only, consider bowel prep or medication induced. She is currently on mesalamine. She takes 1 tab twice daily. She also has Lomotil as needed. She states that her bowel habits have been alternating. They are mostly diarrhea, but she also suffers from constipation. 1 to 2 months ago she was having 1 formed bowel " "movement every 5 or so days. Lately, it has been mostly diarrhea. She will have several episodes per day. They are never at night. She does get some abdominal discomfort around the time of each bowel movement, that is relieved by having the bowel movement. She also endorses mild nausea with some of the episodes. She is under significant life stressors; her  is in a nursing home. She does believe that this could be playing a role. She has stopped all of her depression medications, and admits that her depression is an issue. No other complaints at this time.\"      Review of Systems  ROS Negative unless otherwise stated above.    Past Medical/Surgical History  Past Medical History:   Diagnosis Date    Anxiety     Basal cell carcinoma     Body mass index (BMI) 34.0-34.9, adult 07/20/2021    BMI 34.0-34.9,adult    Cataract     Depression     Encounter for general adult medical examination without abnormal findings 06/01/2018    Welcome to Medicare preventive visit    Encounter for general adult medical examination without abnormal findings 04/21/2017    Encounter for preventive health examination    Fatty (change of) liver, not elsewhere classified     Fatty liver    GERD (gastroesophageal reflux disease)     Hypertension     Hypothyroidism     Influenza due to other identified influenza virus with other respiratory manifestations 03/25/2016    Influenza A    Other conditions influencing health status     Skin Cancer    Other general symptoms and signs 03/25/2016    Flu-like symptoms    Other injury of unspecified body region, initial encounter     Open wound    Personal history of other diseases of the digestive system     History of small bowel obstruction    Personal history of other diseases of the digestive system     History of cholelithiasis    Personal history of other diseases of the digestive system     History of esophageal reflux    Personal history of other diseases of the digestive system     History " of intestinal obstruction    Personal history of other endocrine, nutritional and metabolic disease     History of hypokalemia    Personal history of other infectious and parasitic diseases 02/28/2020    History of Clostridioides difficile colitis    Personal history of other specified conditions     History of fibrocystic disease of breast    Unilateral primary osteoarthritis, right knee 03/01/2024    Unspecified fracture of shaft of humerus, unspecified arm, initial encounter for closed fracture     Closed fracture of humerus    Urinary frequency       Past Surgical History:   Procedure Laterality Date    APPENDECTOMY  12/12/2014    Appendectomy    COLONOSCOPY  12/27/2012    Complete Colonoscopy    OTHER SURGICAL HISTORY  05/10/2019    Lumbar discectomy    OTHER SURGICAL HISTORY  12/12/2014    Anterior Colporrhaphy, Repair Of Cystocele    OTHER SURGICAL HISTORY  12/12/2014    Oophorectomy - Bilateral (Removal Of Both Ovaries)    OTHER SURGICAL HISTORY  12/12/2014    Adjacent Tissue Transfer    SKIN CANCER EXCISION  04/16/2016    Mohs Micrographic Surgery Face    SMALL INTESTINE SURGERY  12/12/2014    Small Bowel Resection    SPINAL FUSION      TOTAL VAGINAL HYSTERECTOMY  12/12/2014    Vaginal Hysterectomy        Social History   reports that she has quit smoking. Her smoking use included cigarettes. She has never used smokeless tobacco. She reports current alcohol use of about 2.0 - 3.0 standard drinks of alcohol per week.     Family History  family history includes Diabetes in her father; Hypertension in her father and mother.     Allergies  Allergies   Allergen Reactions    Adhesive Tape-Silicones Rash       Medications  Current Outpatient Medications   Medication Instructions    atorvastatin (LIPITOR) 10 mg, oral, Daily, For cholesterol    cholecalciferol (Vitamin D-3) 50 mcg (2,000 unit) capsule 2 tablets, oral, Daily    cyanocobalamin (Vitamin B-12) 1,000 mcg tablet TAKE ONE TABLET BY MOUTH DAILY AS DIRECTED  FOR B-12 INSUFFICIENCY    diphenoxylate-atropine (Lomotil) 2.5-0.025 mg tablet 2 tablets, oral, 3 times daily    escitalopram (LEXAPRO) 20 mg, oral, Daily    ferrous sulfate, 325 mg ferrous sulfate, (FerrouSuL) tablet 1 tablet, oral, Daily with breakfast    levothyroxine (SYNTHROID, LEVOXYL) 125 mcg, oral, Daily before breakfast    lisinopril 10 mg, oral, Daily, For blood pressure    LORazepam (ATIVAN) 0.5 mg, oral, Daily PRN    metroNIDAZOLE (Metrogel) 0.75 % gel 1 Application 2 times a day.    omeprazole (PRILOSEC) 40 mg, oral, 2 times daily    ondansetron (Zofran) 4 mg tablet 1 tablet, oral, Every 8 hours PRN    polypodium leucotomos extract (Heliocare) 240 mg capsule 1 capsule, oral, Daily    tiZANidine (ZANAFLEX) 4 mg, oral, Every 8 hours PRN    white petrolatum-mineral oiL 94-3 % ophthalmic ointment 1 Application, Both Eyes, As needed, Dry eyes        Objective   General: A&Ox3, NAD.  HEENT: AT/NC.   CV: RRR. No murmur.  Resp: CTA bilaterally. No wheezing, rhonchi or rales.   GI: Soft, NT/ND.   Extrem: No edema. Pulses intact.  Skin: No Jaundice.   Neuro: No focal deficits.   Psych: Normal mood and affect.     Lab Results   Component Value Date    WBC 9.4 06/05/2024    HGB 12.9 06/05/2024    HCT 37.5 06/05/2024     (H) 06/05/2024     06/05/2024       Chemistry    Lab Results   Component Value Date/Time     (L) 06/20/2024 1435    K 4.5 06/20/2024 1435    CL 93 (L) 06/20/2024 1435    CO2 25 06/20/2024 1435    BUN 8 06/20/2024 1435    CREATININE 0.71 06/20/2024 1435    Lab Results   Component Value Date/Time    CALCIUM 9.4 06/20/2024 1435    ALKPHOS 123 04/05/2024 1130    AST 35 04/05/2024 1130    ALT 29 04/05/2024 1130    BILITOT 0.9 04/05/2024 1130             ASSESSMENT/PLAN  Yamilex Chan is a 72yo female with Sjogren's disease, nonspecific colitis, HTN, HLD, hypothyroidism who presents to clinic for follow-up of IBS (diarrhea).  KUB with no significant stool burden.  Symptoms have  continued to be severe over the last several months.  This includes bloating, diarrhea, urgency.  Symptoms seem to correlate with increasing stress levels/decreasing exercise.      PLAN:  -Obtain stool infectious studies, pancreatic elastase, fecal calprotectin.   -Continue Lomotil and fiber supplementation (increase dose).  -Discussed the importance of strict avoidance of dairy, pop, concentrated sugars.   -Stop drinking wine (drinking 1-2 glasses daily) - may be the culrpit.   -Patient should consider following back up with psychology/psychiatry for management of depression and anxiety.  -Advised patient to work on increasing exercise, stretching, stress reduction.  -Consider colonoscopy pending clinical course (ordered, will try alcohol cessation and f/u stool/blood tests before proceeding).      Following up in 1 month (video visit in 1 month)    Jameson Strickland,

## 2024-08-07 ENCOUNTER — APPOINTMENT (OUTPATIENT)
Dept: GASTROENTEROLOGY | Facility: CLINIC | Age: 74
End: 2024-08-07
Payer: MEDICARE

## 2024-08-07 ENCOUNTER — LAB (OUTPATIENT)
Dept: LAB | Facility: LAB | Age: 74
End: 2024-08-07
Payer: MEDICARE

## 2024-08-07 VITALS
SYSTOLIC BLOOD PRESSURE: 152 MMHG | HEART RATE: 80 BPM | BODY MASS INDEX: 32.61 KG/M2 | DIASTOLIC BLOOD PRESSURE: 83 MMHG | OXYGEN SATURATION: 94 % | RESPIRATION RATE: 18 BRPM | WEIGHT: 191 LBS | HEIGHT: 64 IN

## 2024-08-07 DIAGNOSIS — R19.7 DIARRHEA, UNSPECIFIED TYPE: ICD-10-CM

## 2024-08-07 DIAGNOSIS — R19.7 DIARRHEA, UNSPECIFIED TYPE: Primary | ICD-10-CM

## 2024-08-07 LAB
CRP SERPL-MCNC: 0.67 MG/DL
ERYTHROCYTE [SEDIMENTATION RATE] IN BLOOD BY WESTERGREN METHOD: 47 MM/H (ref 0–30)

## 2024-08-07 PROCEDURE — 82784 ASSAY IGA/IGD/IGG/IGM EACH: CPT

## 2024-08-07 PROCEDURE — 99214 OFFICE O/P EST MOD 30 MIN: CPT | Performed by: STUDENT IN AN ORGANIZED HEALTH CARE EDUCATION/TRAINING PROGRAM

## 2024-08-07 PROCEDURE — 85652 RBC SED RATE AUTOMATED: CPT

## 2024-08-07 PROCEDURE — 3079F DIAST BP 80-89 MM HG: CPT | Performed by: STUDENT IN AN ORGANIZED HEALTH CARE EDUCATION/TRAINING PROGRAM

## 2024-08-07 PROCEDURE — 1123F ACP DISCUSS/DSCN MKR DOCD: CPT | Performed by: STUDENT IN AN ORGANIZED HEALTH CARE EDUCATION/TRAINING PROGRAM

## 2024-08-07 PROCEDURE — 36415 COLL VENOUS BLD VENIPUNCTURE: CPT

## 2024-08-07 PROCEDURE — 3008F BODY MASS INDEX DOCD: CPT | Performed by: STUDENT IN AN ORGANIZED HEALTH CARE EDUCATION/TRAINING PROGRAM

## 2024-08-07 PROCEDURE — 1036F TOBACCO NON-USER: CPT | Performed by: STUDENT IN AN ORGANIZED HEALTH CARE EDUCATION/TRAINING PROGRAM

## 2024-08-07 PROCEDURE — 3077F SYST BP >= 140 MM HG: CPT | Performed by: STUDENT IN AN ORGANIZED HEALTH CARE EDUCATION/TRAINING PROGRAM

## 2024-08-07 PROCEDURE — 1157F ADVNC CARE PLAN IN RCRD: CPT | Performed by: STUDENT IN AN ORGANIZED HEALTH CARE EDUCATION/TRAINING PROGRAM

## 2024-08-07 PROCEDURE — 83516 IMMUNOASSAY NONANTIBODY: CPT

## 2024-08-07 PROCEDURE — 86140 C-REACTIVE PROTEIN: CPT

## 2024-08-07 PROCEDURE — 1159F MED LIST DOCD IN RCRD: CPT | Performed by: STUDENT IN AN ORGANIZED HEALTH CARE EDUCATION/TRAINING PROGRAM

## 2024-08-08 LAB
IGA SERPL-MCNC: 318 MG/DL (ref 70–400)
TTG IGA SER IA-ACNC: <1 U/ML

## 2024-09-11 ENCOUNTER — APPOINTMENT (OUTPATIENT)
Dept: GASTROENTEROLOGY | Facility: CLINIC | Age: 74
End: 2024-09-11
Payer: MEDICARE

## 2024-09-11 DIAGNOSIS — R19.7 DIARRHEA, UNSPECIFIED TYPE: ICD-10-CM

## 2024-09-11 DIAGNOSIS — R63.0 DECREASED APPETITE: ICD-10-CM

## 2024-09-11 DIAGNOSIS — Z80.0 FAMILY HISTORY OF GASTRIC CANCER: Primary | ICD-10-CM

## 2024-09-11 PROCEDURE — 1123F ACP DISCUSS/DSCN MKR DOCD: CPT | Performed by: STUDENT IN AN ORGANIZED HEALTH CARE EDUCATION/TRAINING PROGRAM

## 2024-09-11 PROCEDURE — 1157F ADVNC CARE PLAN IN RCRD: CPT | Performed by: STUDENT IN AN ORGANIZED HEALTH CARE EDUCATION/TRAINING PROGRAM

## 2024-09-11 PROCEDURE — 99214 OFFICE O/P EST MOD 30 MIN: CPT | Performed by: STUDENT IN AN ORGANIZED HEALTH CARE EDUCATION/TRAINING PROGRAM

## 2024-09-11 NOTE — PROGRESS NOTES
"CC: Follow-up.    History of Present Illness:   Yamilex Chan is a 74yo female with Sjogren's disease, nonspecific colitis, HTN, HLD, hypothyroidism who presents to clinic for follow-up of IBS.  Patient is doing better with increased fiber supplementation and reduction of wine.  She does believe that she is now getting more constipated with the occasional blowout diarrhea episode.  However, her symptoms are much better compared to previous months.  She has no other significant concerns at this time.  Of note, her father  of gastric cancer.  She does complain of decreased appetite.  Weight is stable.  She has been working out more.    Colonoscopy : Normal TI, diverticulosis focal active colitis in the ascending colon, otherwise biopsies are normal.      GI visit 2024: \"Patient has both diarrhea and constipation - but she is taking Lomotil daily (multiple tabs on most days). Patient is taking 2-4 fiber capsules. Patient is on a probiotic. No pop, juice. Some dairy products. Drinks 1-2 glasses of wine daily. Patient recently got her knee done. In PT still, + working out. Socializing and going out.\"    GI visit 3/2024: \"Patient states that she has not been doing well.  She has had increased bloating, urgency, diarrhea.  She has also been going through a lot of other medical issues including needing a knee replacement and multiple bouts of Mohs surgery.  Her knee issue has led to decreased activity and increased depression/anxiety.  She has not been able to work out or walk or get outside.  She is taking 4 Lomotil daily.  She takes fiber on occasion which seems to help her symptoms.  No other GI complaints at this time.  Last colonoscopy in .\"    GI visit 2023: \"Patient states that she is doing fairly well. She still gets occasional diarrhea. She takes 1 Lomotil daily. Although, I think this is mostly out of fear of having an accident. She states the bloating has improved with fiber supplement. She is " "still working out with a . She is no longer following with the psychologist. No other concerns at this time.\"        GI visit 5/2023: \"Patient is again doing fairly well. She still has complaints of bloating, diarrhea, urgency, and nausea. + Abdominal fullness, early satiety. She admits that certain food items exacerbate her symptoms (especially the urgency). Seems to be that dairy plays a role. She takes Lomotil up to 4 tabs daily. She usually is doing this prior to workouts or going out for the day. She takes Gas-X for bloating. Zofran a couple times per month for nausea. I question whether there is constipation with overflow diarrhea. She is pretty happy with her current regimen. +weight gain. No blood in her stool. Has been going out with her daughter and drinking wine on occasion. Went to a wedding in [place].\"     GI visit 11/2022: \"Patient doing fairly well. Still gets occasional bloating.  passed away a few months back. She is doing better and becoming more active. No other concerns at this time.\"      GI visit 7/2022: \"Patient is doing well. She has had no diarrhea episodes since our last visit. She will occasionally get constipation with her Lomotil use. She takes 2 pills on most days. She will skip weekends and other days where she is not doing much. She mostly takes it out of fear of having an episode while out and about. Otherwise, no new issues. She was alerted by her 's hospice nurse that he likely only has a few weeks left to live. She is set to go to [place] tomorrow with family to visit her son. Seeing a psychologist which has helped out significantly. She is on Ativan which is helping.\"     GI visit 5/2022: \"Patient states she is not doing well. She is tangential and + pressured speech. As per below, we thought that her IBS with alternating bowel habits was likely more constipation related based on the KUB. We stopped the mesalamine as she was clearly still having symptoms while " "on it. We also attempted to start her on MiraLAX to clear out her bowels and reset the system. However, she only gave the MiraLAX several days and then went back to taking Lomotil and mesalamine as needed. It sounds as if she takes the Lomotil quite frequently. She will also take the MiraLAX as needed. She is also complaining of increased bloating and flatulence. She tried the low FODMAP diet but became very stressed out by it. Her sister is a nutritionist and agrees that it is a very difficult diet to follow. She still is having significant life stressors. No pop use. Limited dairy use (cheese). She is working with a . She states that she has lost 20 or so pounds in the last year and contributes this mostly to her work with a . However, she does admit to poor appetite and anorexia related to fear of eating (diarrhea, accident). No other issues at this time.\"     Per telephone call 2/23/2022: \"Patient underwent KUB to check fecal loading. KUB shows stool throughout the colon. I suspect the patient does not have microscopic colitis and actually suffers from IBS-M. Mesalamine is likely of little clinical utility. Furthermore, it is not necessarily better than placebo in microscopic colitis. Based on cost and my clinical suspicion, we will stop the mesalamine. We will also start MiraLAX and titrate as needed. We discussed the ups and downs associated with IBS. Patient is going to work on stress reduction and is scheduled to meet with a psychologist in April. She is going to message me in 3 to 4 weeks to let me know how she is doing.\"     GI Visit 2/2022: \"Yamilex Chan is a 70yo female with Sjogren's disease, nonspecific colitis (on mesalamine), HTN, HLD, hypothyroidism who presents to clinic for follow-up of nonspecific colitis. She is a former patient of Dr. Herrmann. She has had multiple colonoscopies that showed nonspecific colitis on biopsies. First colonoscopy was in 2020 with random biopsies " "showing nonspecific colitis, consider medication versus infection. Her next colonoscopy was in 2021 and showed nonspecific colitis in the ascending colon only, consider bowel prep or medication induced. She is currently on mesalamine. She takes 1 tab twice daily. She also has Lomotil as needed. She states that her bowel habits have been alternating. They are mostly diarrhea, but she also suffers from constipation. 1 to 2 months ago she was having 1 formed bowel movement every 5 or so days. Lately, it has been mostly diarrhea. She will have several episodes per day. They are never at night. She does get some abdominal discomfort around the time of each bowel movement, that is relieved by having the bowel movement. She also endorses mild nausea with some of the episodes. She is under significant life stressors; her  is in a nursing home. She does believe that this could be playing a role. She has stopped all of her depression medications, and admits that her depression is an issue. No other complaints at this time.\"      Review of Systems  ROS Negative unless otherwise stated above.    Past Medical/Surgical History  Past Medical History:   Diagnosis Date    Anxiety     Basal cell carcinoma     Body mass index (BMI) 34.0-34.9, adult 07/20/2021    BMI 34.0-34.9,adult    Cataract     Depression     Encounter for general adult medical examination without abnormal findings 06/01/2018    Welcome to Medicare preventive visit    Encounter for general adult medical examination without abnormal findings 04/21/2017    Encounter for preventive health examination    Fatty (change of) liver, not elsewhere classified     Fatty liver    GERD (gastroesophageal reflux disease)     Hypertension     Hypothyroidism     Influenza due to other identified influenza virus with other respiratory manifestations 03/25/2016    Influenza A    Other conditions influencing health status     Skin Cancer    Other general symptoms and signs " 03/25/2016    Flu-like symptoms    Other injury of unspecified body region, initial encounter     Open wound    Personal history of other diseases of the digestive system     History of small bowel obstruction    Personal history of other diseases of the digestive system     History of cholelithiasis    Personal history of other diseases of the digestive system     History of esophageal reflux    Personal history of other diseases of the digestive system     History of intestinal obstruction    Personal history of other endocrine, nutritional and metabolic disease     History of hypokalemia    Personal history of other infectious and parasitic diseases 02/28/2020    History of Clostridioides difficile colitis    Personal history of other specified conditions     History of fibrocystic disease of breast    Unilateral primary osteoarthritis, right knee 03/01/2024    Unspecified fracture of shaft of humerus, unspecified arm, initial encounter for closed fracture     Closed fracture of humerus    Urinary frequency       Past Surgical History:   Procedure Laterality Date    APPENDECTOMY  12/12/2014    Appendectomy    COLONOSCOPY  12/27/2012    Complete Colonoscopy    OTHER SURGICAL HISTORY  05/10/2019    Lumbar discectomy    OTHER SURGICAL HISTORY  12/12/2014    Anterior Colporrhaphy, Repair Of Cystocele    OTHER SURGICAL HISTORY  12/12/2014    Oophorectomy - Bilateral (Removal Of Both Ovaries)    OTHER SURGICAL HISTORY  12/12/2014    Adjacent Tissue Transfer    SKIN CANCER EXCISION  04/16/2016    Mohs Micrographic Surgery Face    SMALL INTESTINE SURGERY  12/12/2014    Small Bowel Resection    SPINAL FUSION      TOTAL VAGINAL HYSTERECTOMY  12/12/2014    Vaginal Hysterectomy        Social History   reports that she has quit smoking. Her smoking use included cigarettes. She has never used smokeless tobacco. She reports current alcohol use of about 2.0 - 3.0 standard drinks of alcohol per week.     Family History  family  history includes Diabetes in her father; Hypertension in her father and mother.     Allergies  Allergies   Allergen Reactions    Adhesive Tape-Silicones Rash       Medications  Current Outpatient Medications   Medication Instructions    atorvastatin (LIPITOR) 10 mg, oral, Daily, For cholesterol    cholecalciferol (Vitamin D-3) 50 mcg (2,000 unit) capsule 2 tablets, oral, Daily    cyanocobalamin (Vitamin B-12) 1,000 mcg tablet TAKE ONE TABLET BY MOUTH DAILY AS DIRECTED FOR B-12 INSUFFICIENCY    diphenoxylate-atropine (Lomotil) 2.5-0.025 mg tablet 2 tablets, oral, 3 times daily    escitalopram (LEXAPRO) 20 mg, oral, Daily    ferrous sulfate, 325 mg ferrous sulfate, (FerrouSuL) tablet 1 tablet, oral, Daily with breakfast    levothyroxine (SYNTHROID, LEVOXYL) 125 mcg, oral, Daily before breakfast    lisinopril 10 mg, oral, Daily, For blood pressure    LORazepam (ATIVAN) 0.5 mg, oral, Daily PRN    metroNIDAZOLE (Metrogel) 0.75 % gel 1 Application 2 times a day.    omeprazole (PRILOSEC) 40 mg, oral, 2 times daily    ondansetron (Zofran) 4 mg tablet 1 tablet, oral, Every 8 hours PRN    polypodium leucotomos extract (Heliocare) 240 mg capsule 1 capsule, oral, Daily    tiZANidine (ZANAFLEX) 4 mg, oral, Every 8 hours PRN    white petrolatum-mineral oiL 94-3 % ophthalmic ointment 1 Application, Both Eyes, As needed, Dry eyes        Objective   General: A&Ox3, NAD.  HEENT: AT/NC.   Skin: No Jaundice.   Neuro: No focal deficits.   Psych: Normal mood and affect.     Lab Results   Component Value Date    WBC 9.4 06/05/2024    HGB 12.9 06/05/2024    HCT 37.5 06/05/2024     (H) 06/05/2024     06/05/2024       Chemistry    Lab Results   Component Value Date/Time     (L) 06/20/2024 1435    K 4.5 06/20/2024 1435    CL 93 (L) 06/20/2024 1435    CO2 25 06/20/2024 1435    BUN 8 06/20/2024 1435    CREATININE 0.71 06/20/2024 1435    Lab Results   Component Value Date/Time    CALCIUM 9.4 06/20/2024 1435    ALKPHOS 123  04/05/2024 1130    AST 35 04/05/2024 1130    ALT 29 04/05/2024 1130    BILITOT 0.9 04/05/2024 1130             ASSESSMENT/PLAN  Yamilex Chan is a 72yo female with Sjogren's disease, nonspecific colitis, HTN, HLD, hypothyroidism who presents to clinic for follow-up of IBS (diarrhea).  KUB with no significant stool burden.  Patient is doing better with working out more, reduction in wine, increase fiber intake.      -Hold on stool infectious studies, pancreatic elastase, fecal calprotectin (patient never obtained from last visit).  -Hold Lomotil.  -Continue fiber supplementation.  -Discussed the importance of strict avoidance of dairy, pop, concentrated sugars.   -Continue to hold drinking wine (was drinking 1-2 glasses daily).  -Obtain surveillance colonoscopy.   -Obtain EGD given family history of gastric cancer.  -Patient should consider following back up with psychology/psychiatry for management of depression and anxiety.  -Advised patient to work on increasing exercise, stretching, stress reduction.    Total video visit time: 8 minutes.    Jameson Strickland, DO

## 2024-09-19 ENCOUNTER — APPOINTMENT (OUTPATIENT)
Dept: ORTHOPEDIC SURGERY | Facility: CLINIC | Age: 74
End: 2024-09-19
Payer: MEDICARE

## 2024-09-19 DIAGNOSIS — M17.11 PRIMARY OSTEOARTHRITIS OF RIGHT KNEE: Primary | ICD-10-CM

## 2024-09-19 PROCEDURE — 1157F ADVNC CARE PLAN IN RCRD: CPT | Performed by: ORTHOPAEDIC SURGERY

## 2024-09-19 PROCEDURE — 1036F TOBACCO NON-USER: CPT | Performed by: ORTHOPAEDIC SURGERY

## 2024-09-19 PROCEDURE — 1123F ACP DISCUSS/DSCN MKR DOCD: CPT | Performed by: ORTHOPAEDIC SURGERY

## 2024-09-19 PROCEDURE — 1159F MED LIST DOCD IN RCRD: CPT | Performed by: ORTHOPAEDIC SURGERY

## 2024-09-19 PROCEDURE — 99213 OFFICE O/P EST LOW 20 MIN: CPT | Performed by: ORTHOPAEDIC SURGERY

## 2024-09-19 NOTE — PROGRESS NOTES
History of Present Illness:  Patient returns today endorsing minimal pain.  Patient notes improving functional status.    Physical Examination:  Well healed incision   ROM: full  No laxity to varus / valgus stress  + Plantar/dorsiflexion  Negative Lula test    Assessment:  Patient status post right total knee arthroplasty, doing well    Plan:  Discussed improvement in strength, swelling over the course of the first year post op.  Discussed return to activities and activities to avoid.  Dental prophylaxis discussed.  Follow-up:  Not needed at this time

## 2024-09-25 DIAGNOSIS — E03.9 ACQUIRED HYPOTHYROIDISM: ICD-10-CM

## 2024-09-25 DIAGNOSIS — F41.8 SITUATIONAL ANXIETY: ICD-10-CM

## 2024-09-25 RX ORDER — ESCITALOPRAM OXALATE 20 MG/1
20 TABLET ORAL DAILY
Qty: 90 TABLET | Refills: 1 | Status: SHIPPED | OUTPATIENT
Start: 2024-09-25 | End: 2025-09-20

## 2024-09-25 RX ORDER — LEVOTHYROXINE SODIUM 125 UG/1
125 TABLET ORAL
Qty: 90 TABLET | Refills: 1 | Status: SHIPPED | OUTPATIENT
Start: 2024-09-25 | End: 2025-09-20

## 2024-10-15 ENCOUNTER — LAB (OUTPATIENT)
Dept: LAB | Facility: LAB | Age: 74
End: 2024-10-15
Payer: COMMERCIAL

## 2024-10-15 DIAGNOSIS — E78.5 DYSLIPIDEMIA, GOAL LDL BELOW 100: ICD-10-CM

## 2024-10-15 DIAGNOSIS — I10 BENIGN ESSENTIAL HYPERTENSION: ICD-10-CM

## 2024-10-15 DIAGNOSIS — R73.03 PREDIABETES: ICD-10-CM

## 2024-10-15 LAB
ALT SERPL W P-5'-P-CCNC: 26 U/L (ref 7–45)
ANION GAP SERPL CALC-SCNC: 15 MMOL/L (ref 10–20)
BUN SERPL-MCNC: 6 MG/DL (ref 6–23)
CALCIUM SERPL-MCNC: 9.4 MG/DL (ref 8.6–10.3)
CHLORIDE SERPL-SCNC: 96 MMOL/L (ref 98–107)
CHOLEST SERPL-MCNC: 192 MG/DL (ref 0–199)
CHOLESTEROL/HDL RATIO: 4
CO2 SERPL-SCNC: 26 MMOL/L (ref 21–32)
CREAT SERPL-MCNC: 0.71 MG/DL (ref 0.5–1.05)
EGFRCR SERPLBLD CKD-EPI 2021: 90 ML/MIN/1.73M*2
EST. AVERAGE GLUCOSE BLD GHB EST-MCNC: 100 MG/DL
GLUCOSE SERPL-MCNC: 125 MG/DL (ref 74–99)
HBA1C MFR BLD: 5.1 %
HDLC SERPL-MCNC: 48.4 MG/DL
LDLC SERPL CALC-MCNC: 91 MG/DL
NON HDL CHOLESTEROL: 144 MG/DL (ref 0–149)
POTASSIUM SERPL-SCNC: 4.5 MMOL/L (ref 3.5–5.3)
SODIUM SERPL-SCNC: 132 MMOL/L (ref 136–145)
T4 FREE SERPL-MCNC: 0.79 NG/DL (ref 0.61–1.12)
TRIGL SERPL-MCNC: 263 MG/DL (ref 0–149)
TSH SERPL-ACNC: 9.98 MIU/L (ref 0.44–3.98)
VLDL: 53 MG/DL (ref 0–40)

## 2024-10-15 PROCEDURE — 83036 HEMOGLOBIN GLYCOSYLATED A1C: CPT

## 2024-10-15 PROCEDURE — 36415 COLL VENOUS BLD VENIPUNCTURE: CPT

## 2024-10-15 PROCEDURE — 84439 ASSAY OF FREE THYROXINE: CPT

## 2024-10-15 PROCEDURE — 84460 ALANINE AMINO (ALT) (SGPT): CPT

## 2024-10-15 PROCEDURE — 84443 ASSAY THYROID STIM HORMONE: CPT

## 2024-10-15 PROCEDURE — 80048 BASIC METABOLIC PNL TOTAL CA: CPT

## 2024-10-15 PROCEDURE — 80061 LIPID PANEL: CPT

## 2024-10-15 NOTE — RESULT ENCOUNTER NOTE
Results reviewed. No urgent findings.  Will Review results in detail at upcoming office appointment scheduled soon.      Bhavik Tristan MD

## 2024-10-16 ENCOUNTER — APPOINTMENT (OUTPATIENT)
Dept: PRIMARY CARE | Facility: CLINIC | Age: 74
End: 2024-10-16
Payer: MEDICARE

## 2024-10-24 ENCOUNTER — APPOINTMENT (OUTPATIENT)
Dept: PRIMARY CARE | Facility: CLINIC | Age: 74
End: 2024-10-24
Payer: MEDICARE

## 2024-10-24 VITALS
WEIGHT: 188.4 LBS | HEART RATE: 73 BPM | BODY MASS INDEX: 32.17 KG/M2 | OXYGEN SATURATION: 96 % | SYSTOLIC BLOOD PRESSURE: 132 MMHG | DIASTOLIC BLOOD PRESSURE: 84 MMHG | TEMPERATURE: 96.8 F | HEIGHT: 64 IN

## 2024-10-24 DIAGNOSIS — R73.03 PREDIABETES: ICD-10-CM

## 2024-10-24 DIAGNOSIS — Z96.651 TOTAL KNEE REPLACEMENT STATUS, RIGHT: ICD-10-CM

## 2024-10-24 DIAGNOSIS — I10 BENIGN ESSENTIAL HYPERTENSION: Primary | ICD-10-CM

## 2024-10-24 DIAGNOSIS — J30.1 SEASONAL ALLERGIC RHINITIS DUE TO POLLEN: ICD-10-CM

## 2024-10-24 DIAGNOSIS — Z00.00 ROUTINE GENERAL MEDICAL EXAMINATION AT HEALTH CARE FACILITY: ICD-10-CM

## 2024-10-24 DIAGNOSIS — Z12.31 ENCOUNTER FOR SCREENING MAMMOGRAM FOR BREAST CANCER: ICD-10-CM

## 2024-10-24 DIAGNOSIS — E55.9 VITAMIN D DEFICIENCY: ICD-10-CM

## 2024-10-24 DIAGNOSIS — E53.8 VITAMIN B12 DEFICIENCY: ICD-10-CM

## 2024-10-24 DIAGNOSIS — E03.9 ACQUIRED HYPOTHYROIDISM: ICD-10-CM

## 2024-10-24 DIAGNOSIS — K58.2 MIXED IRRITABLE BOWEL SYNDROME: ICD-10-CM

## 2024-10-24 DIAGNOSIS — H43.813 VITREOUS DEGENERATION OF BOTH EYES: ICD-10-CM

## 2024-10-24 DIAGNOSIS — E87.1 HYPONATREMIA: ICD-10-CM

## 2024-10-24 DIAGNOSIS — R19.7 DIARRHEA, UNSPECIFIED TYPE: ICD-10-CM

## 2024-10-24 DIAGNOSIS — E78.5 DYSLIPIDEMIA, GOAL LDL BELOW 100: ICD-10-CM

## 2024-10-24 DIAGNOSIS — F40.298 FEAR OF FALLING: Chronic | ICD-10-CM

## 2024-10-24 DIAGNOSIS — K80.20 CALCULUS OF GALLBLADDER WITHOUT CHOLECYSTITIS WITHOUT OBSTRUCTION: ICD-10-CM

## 2024-10-24 PROCEDURE — G2211 COMPLEX E/M VISIT ADD ON: HCPCS | Performed by: INTERNAL MEDICINE

## 2024-10-24 PROCEDURE — 3008F BODY MASS INDEX DOCD: CPT | Performed by: INTERNAL MEDICINE

## 2024-10-24 PROCEDURE — 1160F RVW MEDS BY RX/DR IN RCRD: CPT | Performed by: INTERNAL MEDICINE

## 2024-10-24 PROCEDURE — 1159F MED LIST DOCD IN RCRD: CPT | Performed by: INTERNAL MEDICINE

## 2024-10-24 PROCEDURE — 3079F DIAST BP 80-89 MM HG: CPT | Performed by: INTERNAL MEDICINE

## 2024-10-24 PROCEDURE — 99214 OFFICE O/P EST MOD 30 MIN: CPT | Performed by: INTERNAL MEDICINE

## 2024-10-24 PROCEDURE — 3075F SYST BP GE 130 - 139MM HG: CPT | Performed by: INTERNAL MEDICINE

## 2024-10-24 PROCEDURE — 1123F ACP DISCUSS/DSCN MKR DOCD: CPT | Performed by: INTERNAL MEDICINE

## 2024-10-24 PROCEDURE — 1157F ADVNC CARE PLAN IN RCRD: CPT | Performed by: INTERNAL MEDICINE

## 2024-10-24 RX ORDER — DIPHENOXYLATE HYDROCHLORIDE AND ATROPINE SULFATE 2.5; .025 MG/1; MG/1
2 TABLET ORAL 3 TIMES DAILY PRN
Qty: 120 TABLET | Refills: 0 | Status: SHIPPED | OUTPATIENT
Start: 2024-10-24

## 2024-10-24 RX ORDER — LORAZEPAM 0.5 MG/1
0.5 TABLET ORAL DAILY PRN
Qty: 90 TABLET | Refills: 0 | Status: SHIPPED | OUTPATIENT
Start: 2024-10-24 | End: 2025-01-22

## 2024-10-24 NOTE — PROGRESS NOTES
"Subjective   Patient ID: Yamilex Ye is a 74 y.o. female who presents for Follow-up.    Here for follow-up importantly visit with her alprazolam.  For the most part doing better.  Her knees are a bit sore.  Planning to go to [place] to see her son and his family, her granddaughter graduates from high school with a associates degree before going to [employer] college.    She continues to work with her GI provider-she has given up white wine to eliminate sulfites  Recently she had a pot pie from a restaurant-and within several bites she had diarrhea with multiple trips to the bathroom.    She is volunteering at the [employer]    She is walking 2-3 times weekly           Review of Systems    Objective   /84   Pulse 73   Temp 36 °C (96.8 °F)   Ht 1.613 m (5' 3.5\")   Wt 85.5 kg (188 lb 6.4 oz)   SpO2 96%   BMI 32.85 kg/m²     Physical Exam  Vitals reviewed.   Constitutional:       Appearance: Normal appearance.   HENT:      Head: Normocephalic and atraumatic.   Eyes:      General: No scleral icterus.        Right eye: No discharge.         Left eye: No discharge.      Extraocular Movements: Extraocular movements intact.      Conjunctiva/sclera: Conjunctivae normal.      Pupils: Pupils are equal, round, and reactive to light.   Cardiovascular:      Rate and Rhythm: Normal rate and regular rhythm.      Pulses: Normal pulses.      Heart sounds: Normal heart sounds. No murmur heard.  Pulmonary:      Effort: Pulmonary effort is normal.      Breath sounds: Normal breath sounds. No wheezing or rhonchi.   Musculoskeletal:         General: No deformity or signs of injury. Normal range of motion.      Cervical back: Normal range of motion and neck supple. No rigidity or tenderness.   Lymphadenopathy:      Cervical: No cervical adenopathy.   Skin:     General: Skin is warm and dry.      Findings: No rash.   Neurological:      General: No focal deficit present.      Mental Status: She is alert and oriented to " person, place, and time. Mental status is at baseline.      Cranial Nerves: No cranial nerve deficit.      Sensory: No sensory deficit.      Gait: Gait normal.   Psychiatric:         Mood and Affect: Mood normal.         Behavior: Behavior normal.         Thought Content: Thought content normal.         Judgment: Judgment normal.         Assessment/Plan   Problem List Items Addressed This Visit             ICD-10-CM    Allergic rhinitis J30.9    Cholelithiasis K80.20    Dyslipidemia, goal LDL below 100 E78.5    Relevant Orders    TSH with reflex to Free T4 if abnormal    Hypothyroidism E03.9    Mixed irritable bowel syndrome K58.2    Vitamin B12 deficiency E53.8    Vitamin D deficiency E55.9    Hyponatremia E87.1    Benign essential hypertension - Primary I10    Diarrhea R19.7    Relevant Medications    diphenoxylate-atropine (Lomotil) 2.5-0.025 mg tablet    Fear of falling F40.298    Relevant Medications    LORazepam (Ativan) 0.5 mg tablet    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     Benzodiazepines:  What is the patient's goal of therapy?   Improve anxiety  Is this being achieved with current treatment? Yes, with medication      CSA:  7/11/2024  PDMP:   10/24/2024  UDS:  9/14/23   ADRIANE-7:   5/22/23  (score =3)    Activities of Daily Living:   Is your overall impression that this patient is benefiting (symptom reduction outweighs side effects) from benzodiazepine therapy? Yes     1. Physical Functioning: Better  2. Family Relationship: Better  3. Social Relationship: Better  4. Mood: Better  5. Sleep Patterns:  Better  6. Overall Function: Better    Situational anxiety / depression-very difficult for several years with her 's dementia declining course and subsequent passing. Support provided. She will continue the additional Lexapro as ordered. Her   mid 2022.           Doing better, but lonely often. Hard to meet companions.  She no longer sees her counselor. Support provided.  She does feel lonely.  She does not want to do anything in medical she states.  Suggested volunteering with people as she enjoys being around people.            3/24-it has been a challenging winter.  First she had multiple problems with her 3 facial Moh's procedures within 4 months since December, now she has to have her knee replaced, her family has been supportive she is looking forward to [facility] but it has been challenging alone since her                10/24-doing a bit better-volunteers at the [employer], she enjoys her pet dog- Farhad, walking 2-3 times weekly.  Looking forward to going out to [place] to see her granddaughters high school graduation before she begins college studying art        Living situation-she is -lives alone with her dog Farhad.  She lives in a one-story home.  Her 2 daughters live in town    S/p      Right Total knee replacement  24 w/  Dr. Newman.  Doing much better.  She went through outpatient physical therapy with her therapist, Presley            -she has been cleared to get back into the pool.  She has been walking her new dog more as well           10/24-presently walking 2 to 3 days weekly      Hypertension-she will continue lisinopril            BP improved on recheck.     Hyponatremia- mild; will follow             -sodium 127-a bit down from 131 in May 2023                10/24-sodium 132    Dyslipidemia-tolerating low-dose atorvastatin.  Goal LDL under 100             -.  For now we will continue              10/24-LDL  91-    Prediabetes/elevated weight            11/23-fasting blood sugar 124, A1c 5.8%.  BMI 34.4 she will meet with our pharmacy team to consider one of the newer medications           10/24-fasting glucose 125, but A1c 5.1%.  BMI 32.9.  Will continue to follow     Elevated 10 year cardiac risk assessment- 10 year cardiac risk assessment at 9.9% January 2019 reduces to 7.4% taking statin, 5.4% with statin and blood pressure Rx. She will continue lisinopril and atorvastatin. She will continue exercise when her schedule permits     Exercise routine-she will advance as tolerated.              She has been using a - Vel, and hopes to restart Silver Sneakers when she is able tolerate it.  In the past-encouraged water walking 3 x wk at [facility]'s pool              10/24-walking 2 to 3 days weekly with her dog    Lymphedema- occasional while flying. Uses compression hoses. Not problematic at this time.      Hypothyroidism-we'll continue to follow thyroid labs with present thyroid supplement prescription.              10/24-TSH elevated but FTI normal.  Will plan to recheck    Mild anemia-she will continue her iron and B12 and will reassess labs before follow-up            Improved on recheck-4/24 likely a postop issue after her knee replacement    Left knee injury-November 2022-she has worked with Dr. Israel who did an MRI which shows nondisplaced tibial fracture at the insertion of the PCL, possibly a meniscal tear as well as patellar arthritis. She is doing physical therapy. Presently using a cane. She will continue caution. Handicap parking placard provided January 2023               IBS / Recurrent diarrhea alternating with constipation- initially diagnosed as colitis- noted on early 2020 colonoscopy per Dr. Ibarra. Lexapro was discontinued. She has used Lomotil as needed from Dr. Ibarra. Colonoscopy updated January 2021- significantly improved she states.  With less stress, there is less  diarrhea she notes. Overall improved she still uses the Lomotil occasionally. Encouraged dietary caution with her upcoming plans to visit a [employer] restaurant.                 She saw Dr. Strickland early May '23. He rec'd more metamucil - now on 2 cap/day. Colonoscopy planned for 2024. Mornings still challenging w/ am diarrhea.  She uses Lomotil-she will get refills from him.  Anticipates a colonoscopy in the spring 2024              She continues Lomotil as needed and we will see him in 3/24             10/24-she is still bothered by intermittent diarrhea-EGD and C-scope planned 12/24 with Dr. Strickland.  She gave up red wine to avoid sulfites.  Recently she had several bites of a pot pie which put her in the bathroom with diarrhea for a while.  Encouraged her to [place] back with her gastro doctor as this appears to be more of a trigger rather than say a lactose intolerance issue.  Lomotil refill requested/provided    Acid reflux-stable with PPI- which she will continue especially as she is on the naproxen           Anticipate EGD in 2024.  Smaller meals help             EGD scheduled for 12/24.     Cholelithiasis- asymptomatic-noted on 2012 CT             10/24-interestingly recently she had prolonged diarrhea after eating several bites of a pot pie from a restaurant suggesting that maybe there is more of a issue with the gallstone.  Ultrasound and HIDA scan ordered     Family history of colon cancer-she will continue colonoscopy care as below-at least every 5 years.    Colonoscopy updated January 2021. Next in 3 years-January 2024            She met w/ Dr. Strickland 3/24; she'll see him back at 6 mo, 9/24.                 Colonoscopy scheduled 12/24    History of Right lower quadrant abdominal wall hernia-surgery w/ Dr. Ravindra Louise, and Dr. Finnegan at [facility] Feb. 18. Improved       Lumbar spasms-mainly in the SI joint area-improved after working with her physical therapist, Presley for exercises    S/p lumbar  surgery 4/17/19 per Dr. Ward for L 5 cyst--then her second lumbar surgery July 2020 laminectomy. Ongoing pain has been challenging. She will continue exercises, and follow-up with her physical therapist Presley as needed. She will see Dr. Ward as well. She will continue stretching.   X-rays completed per Dr. Ward. Unremarkable.         Gynecologic care / history of vaginal hysterectomy she will follow-up with GYN as needed     Annual mammogram- updated January 2024 January 2025 mammogram ordered     Vitamin D deficiency- encouraged patient to continue vitamin D daily as ordered. Will consider vitamin D level regularly.     Vitamin B12 deficiency- improved on 01/23 labs.             B 12 still high in 5/23 - she'll reduce B 12 to every other day     Seasonal allergies-she will use Zyrtec seasonally as needed        History of skin cancer/rosacea-  history of Mohs surgery, she understands importance of sunscreen   She continues to follow at [facility]-now working with Dr. Connie Kelly who does laser treatments on her cheeks mainly presently.             12/23-she has had Mohs surgery on her chin area which has been complicated by dehiscence requiring debridement.  She will continue to work with the dermatology department at [facility].  She states that she has a Mohs procedure between her eyebrows in January 2024              3/24; s/p 3 Mohs on face since 12/23 per Sarahi Tabor MD at [facility]. Follow up soon, w/ facial derm abraision soon      Ophthalmology care-she will see Dr. Cade as scheduled. Sadly her right eye has chronically poor vision from a remote retinal vein occlusion. The left eye has dry eyes and she may need a keratotomy and contact lens implanted permanently.  Right cataract surgery completed 12/22, with noticeably better vision. Left scheduled for 2/23. Left blepharoplasty  3/23 per Dr. Lloyd.             Vision improved and she is pleased with the results at  this. She will continue with Dr. Cade- now at [facility]              10/24-left corneal irritation continues-she will continue to follow-up with Dr. Long     Hearing concerns- audiology evaluation with audiology and ENT completed 12/22.      Flu shot each fall- updated 9/24     Covid series-completed. Covid Booster completed 9/24  RSV vaccination completed 7/24     Shingrix series completed in 2020    Tdap booster 7/24     Follow-up in 3 months, sooner with concerns     Charting was completed using voice recognition technology and may include unintended errors.

## 2024-10-31 ENCOUNTER — HOSPITAL ENCOUNTER (OUTPATIENT)
Dept: RADIOLOGY | Facility: HOSPITAL | Age: 74
Discharge: HOME | End: 2024-10-31
Payer: MEDICARE

## 2024-10-31 ENCOUNTER — APPOINTMENT (OUTPATIENT)
Dept: RADIOLOGY | Facility: HOSPITAL | Age: 74
End: 2024-10-31
Payer: MEDICARE

## 2024-10-31 DIAGNOSIS — K80.20 CALCULUS OF GALLBLADDER WITHOUT CHOLECYSTITIS WITHOUT OBSTRUCTION: ICD-10-CM

## 2024-10-31 PROCEDURE — 78227 HEPATOBIL SYST IMAGE W/DRUG: CPT | Performed by: STUDENT IN AN ORGANIZED HEALTH CARE EDUCATION/TRAINING PROGRAM

## 2024-10-31 PROCEDURE — 78227 HEPATOBIL SYST IMAGE W/DRUG: CPT

## 2024-10-31 PROCEDURE — A9537 TC99M MEBROFENIN: HCPCS | Performed by: INTERNAL MEDICINE

## 2024-10-31 PROCEDURE — 3430000001 HC RX 343 DIAGNOSTIC RADIOPHARMACEUTICALS: Performed by: INTERNAL MEDICINE

## 2024-10-31 RX ORDER — KIT FOR THE PREPARATION OF TECHNETIUM TC 99M MEBROFENIN 45 MG/10ML
5 INJECTION, POWDER, LYOPHILIZED, FOR SOLUTION INTRAVENOUS
Status: COMPLETED | OUTPATIENT
Start: 2024-10-31 | End: 2024-10-31

## 2024-11-04 ENCOUNTER — APPOINTMENT (OUTPATIENT)
Dept: RADIOLOGY | Facility: CLINIC | Age: 74
End: 2024-11-04
Payer: MEDICARE

## 2024-11-21 ENCOUNTER — ANESTHESIA EVENT (OUTPATIENT)
Dept: GASTROENTEROLOGY | Facility: EXTERNAL LOCATION | Age: 74
End: 2024-11-21

## 2024-12-03 ENCOUNTER — APPOINTMENT (OUTPATIENT)
Dept: GASTROENTEROLOGY | Facility: EXTERNAL LOCATION | Age: 74
End: 2024-12-03
Payer: COMMERCIAL

## 2024-12-03 ENCOUNTER — ANESTHESIA (OUTPATIENT)
Dept: GASTROENTEROLOGY | Facility: EXTERNAL LOCATION | Age: 74
End: 2024-12-03

## 2024-12-03 VITALS
HEART RATE: 65 BPM | SYSTOLIC BLOOD PRESSURE: 154 MMHG | OXYGEN SATURATION: 95 % | WEIGHT: 183 LBS | BODY MASS INDEX: 32.43 KG/M2 | RESPIRATION RATE: 19 BRPM | HEIGHT: 63 IN | DIASTOLIC BLOOD PRESSURE: 73 MMHG | TEMPERATURE: 97.2 F

## 2024-12-03 DIAGNOSIS — Z80.0 FAMILY HISTORY OF GASTRIC CANCER: ICD-10-CM

## 2024-12-03 DIAGNOSIS — R19.7 DIARRHEA, UNSPECIFIED TYPE: ICD-10-CM

## 2024-12-03 PROCEDURE — 45380 COLONOSCOPY AND BIOPSY: CPT | Performed by: STUDENT IN AN ORGANIZED HEALTH CARE EDUCATION/TRAINING PROGRAM

## 2024-12-03 PROCEDURE — 43239 EGD BIOPSY SINGLE/MULTIPLE: CPT | Performed by: STUDENT IN AN ORGANIZED HEALTH CARE EDUCATION/TRAINING PROGRAM

## 2024-12-03 PROCEDURE — 43251 EGD REMOVE LESION SNARE: CPT | Performed by: STUDENT IN AN ORGANIZED HEALTH CARE EDUCATION/TRAINING PROGRAM

## 2024-12-03 PROCEDURE — 45385 COLONOSCOPY W/LESION REMOVAL: CPT | Performed by: STUDENT IN AN ORGANIZED HEALTH CARE EDUCATION/TRAINING PROGRAM

## 2024-12-03 PROCEDURE — 88305 TISSUE EXAM BY PATHOLOGIST: CPT | Mod: TC,ELYLAB | Performed by: STUDENT IN AN ORGANIZED HEALTH CARE EDUCATION/TRAINING PROGRAM

## 2024-12-03 PROCEDURE — 88305 TISSUE EXAM BY PATHOLOGIST: CPT | Performed by: PATHOLOGY

## 2024-12-03 RX ORDER — ONDANSETRON HYDROCHLORIDE 2 MG/ML
INJECTION, SOLUTION INTRAVENOUS AS NEEDED
Status: DISCONTINUED | OUTPATIENT
Start: 2024-12-03 | End: 2024-12-03

## 2024-12-03 RX ORDER — LIDOCAINE HYDROCHLORIDE 20 MG/ML
INJECTION, SOLUTION INFILTRATION; PERINEURAL AS NEEDED
Status: DISCONTINUED | OUTPATIENT
Start: 2024-12-03 | End: 2024-12-03

## 2024-12-03 RX ORDER — PROPOFOL 10 MG/ML
INJECTION, EMULSION INTRAVENOUS AS NEEDED
Status: DISCONTINUED | OUTPATIENT
Start: 2024-12-03 | End: 2024-12-03

## 2024-12-03 SDOH — HEALTH STABILITY: MENTAL HEALTH: CURRENT SMOKER: 0

## 2024-12-03 ASSESSMENT — PAIN - FUNCTIONAL ASSESSMENT
PAIN_FUNCTIONAL_ASSESSMENT: 0-10

## 2024-12-03 ASSESSMENT — PAIN SCALES - GENERAL
PAINLEVEL_OUTOF10: 5 - MODERATE PAIN
PAINLEVEL_OUTOF10: 4
PAINLEVEL_OUTOF10: 7
PAINLEVEL_OUTOF10: 8
PAINLEVEL_OUTOF10: 7
PAINLEVEL_OUTOF10: 0 - NO PAIN
PAIN_LEVEL: 2

## 2024-12-03 ASSESSMENT — COLUMBIA-SUICIDE SEVERITY RATING SCALE - C-SSRS
6. HAVE YOU EVER DONE ANYTHING, STARTED TO DO ANYTHING, OR PREPARED TO DO ANYTHING TO END YOUR LIFE?: NO
1. IN THE PAST MONTH, HAVE YOU WISHED YOU WERE DEAD OR WISHED YOU COULD GO TO SLEEP AND NOT WAKE UP?: NO
2. HAVE YOU ACTUALLY HAD ANY THOUGHTS OF KILLING YOURSELF?: NO

## 2024-12-03 NOTE — SIGNIFICANT EVENT
"Dr Strickland to speak with pt; productive cough light brown thick sputum; encouraged to rotate side to side; passing flatus; abdomen round; soft; midabdominal  discomfort upon palpation; rates discomfort as \"cramping 8/10\"  "

## 2024-12-03 NOTE — H&P
Outpatient NR Procedure H&P    Patient Profile  Name Yamilex Ye  Date of Birth 1950  MRN [id number]  PCP Bhavik Tristan        Diagnosis: Family history of gastric cancer, history of colon polyps.  Procedure(s):  EGD/Colonoscopy.    Allergies  Allergies   Allergen Reactions    Adhesive Tape-Silicones Rash       Past Medical History   Past Medical History:   Diagnosis Date    Anxiety     Basal cell carcinoma     Body mass index (BMI) 34.0-34.9, adult 07/20/2021    BMI 34.0-34.9,adult    Cataract     Depression     Encounter for general adult medical examination without abnormal findings 06/01/2018    Welcome to Medicare preventive visit    Encounter for general adult medical examination without abnormal findings 04/21/2017    Encounter for preventive health examination    Fatty (change of) liver, not elsewhere classified     Fatty liver    GERD (gastroesophageal reflux disease)     Hypertension     Hypothyroidism     Influenza due to other identified influenza virus with other respiratory manifestations 03/25/2016    Influenza A    Other conditions influencing health status     Skin Cancer    Other general symptoms and signs 03/25/2016    Flu-like symptoms    Other injury of unspecified body region, initial encounter     Open wound    Personal history of other diseases of the digestive system     History of small bowel obstruction    Personal history of other diseases of the digestive system     History of cholelithiasis    Personal history of other diseases of the digestive system     History of esophageal reflux    Personal history of other diseases of the digestive system     History of intestinal obstruction    Personal history of other endocrine, nutritional and metabolic disease     History of hypokalemia    Personal history of other infectious and parasitic diseases 02/28/2020    History of Clostridioides difficile colitis    Personal history of other specified conditions     History of fibrocystic  disease of breast    Unilateral primary osteoarthritis, right knee 03/01/2024    Unspecified fracture of shaft of humerus, unspecified arm, initial encounter for closed fracture     Closed fracture of humerus    Urinary frequency        Medication Reviewed - yes  Prior to Admission medications    Medication Sig Start Date End Date Taking? Authorizing Provider   atorvastatin (Lipitor) 10 mg tablet Take 1 tablet (10 mg) by mouth once daily. For cholesterol 3/12/24   Bhavik Tristan MD   cholecalciferol (Vitamin D-3) 50 mcg (2,000 unit) capsule Take 2 tablets by mouth once daily. 3/19/13   Historical Provider, MD   cyanocobalamin (Vitamin B-12) 1,000 mcg tablet TAKE ONE TABLET BY MOUTH DAILY AS DIRECTED FOR B-12 INSUFFICIENCY 5/16/24   Bhavik Tristan MD   diphenoxylate-atropine (Lomotil) 2.5-0.025 mg tablet Take 2 tablets by mouth 3 times a day as needed for diarrhea. 10/24/24   Bhavik Tristan MD   escitalopram (Lexapro) 20 mg tablet Take 1 tablet (20 mg) by mouth once daily. 9/25/24 9/20/25  Bhavik Tristan MD   ferrous sulfate, 325 mg ferrous sulfate, (FerrouSuL) tablet Take 1 tablet by mouth once daily with breakfast. 4/1/24   Bhavik Tristan MD   levothyroxine (Synthroid, Levoxyl) 125 mcg tablet Take 1 tablet (125 mcg) by mouth once daily in the morning. Take before meals. 9/25/24 9/20/25  Bhavik Tristan MD   lisinopril 10 mg tablet Take 1 tablet (10 mg) by mouth once daily. For blood pressure 3/12/24 3/12/25  Bhavik Tristan MD   LORazepam (Ativan) 0.5 mg tablet Take 1 tablet (0.5 mg) by mouth once daily as needed for anxiety. 10/24/24 1/22/25  Bhavik Tristan MD   metroNIDAZOLE (Metrogel) 0.75 % gel 1 Application 2 times a day. 10/6/23   Historical Provider, MD   omeprazole (PriLOSEC) 40 mg DR capsule Take 1 capsule (40 mg) by mouth 2 times a day. 3/12/24   Bhavik Tristan MD   ondansetron (Zofran) 4 mg tablet Take 1 tablet (4 mg) by mouth every 8 hours if needed. 3/17/20   Historical Provider, MD   polypodium leucotomos extract  (Heliocare) 240 mg capsule Take 1 capsule by mouth early in the morning..    Historical Provider, MD   tiZANidine (Zanaflex) 2 mg tablet Take 2 tablets (4 mg) by mouth every 8 hours if needed for muscle spasms for up to 5 days. 4/19/24 4/24/24  Briana Cullen PA-C   white petrolatum-mineral oiL 94-3 % ophthalmic ointment Apply 1 Application to both eyes if needed. Dry eyes 7/30/20   Historical Provider, MD       Physical Exam  Vitals:    12/03/24 0915   BP: 130/83   Pulse: 85   Resp: 19   Temp: 36.5 °C (97.7 °F)   SpO2: 95%      Weight   Vitals:    12/03/24 0915   Weight: 83 kg (183 lb)     BMI Body mass index is 32.42 kg/m².    General: A&Ox3, NAD.  HEENT: AT/NC.   CV: RRR. No murmur.  Resp: CTA bilaterally. No wheezing, rhonchi or rales.   GI: Soft, NT/ND.  Extrem: No edema. Pulses intact.  Skin: No Jaundice.   Neuro: No focal deficits.   Psych: Normal mood and affect.        Oropharyngeal Classification II (hard and soft palate, upper portion of tonsils and uvula visible)  ASA PS Classification 2  Sedation Plan: Deep Sedation.  Procedure Plan - pre-procedural (re)assesment completed by physician:  discharge/transfer patient when discharge criteria met    Jameson Strickland DO  12/3/2024 9:41 AM

## 2024-12-03 NOTE — ANESTHESIA POSTPROCEDURE EVALUATION
Patient: Yamilex Ye    Procedure Summary       Date: 12/03/24 Room / Location: [facility]    Anesthesia Start: 0946 Anesthesia Stop: 1053    Procedures:       COLONOSCOPY      EGD Diagnosis:       Diarrhea, unspecified type      Family history of gastric cancer    Scheduled Providers: Jameson Strickland DO Responsible Provider: ALFREDO Aragon    Anesthesia Type: MAC ASA Status: 2            Anesthesia Type: MAC    Vitals Value Taken Time   /73 12/03/24 1123   Temp 36.2 °C (97.2 °F) 12/03/24 1052   Pulse 65 12/03/24 1123   Resp 19 12/03/24 1123   SpO2 95 % 12/03/24 1123       Anesthesia Post Evaluation    Patient location during evaluation: bedside  Patient participation: complete - patient participated  Level of consciousness: awake and alert  Pain score: 2  Pain management: adequate  Airway patency: patent  Cardiovascular status: acceptable  Respiratory status: acceptable  Hydration status: acceptable  Postoperative Nausea and Vomiting: none        No notable events documented.

## 2024-12-03 NOTE — SIGNIFICANT EVENT
Pt requesting to take tylenol from her purse; okay with Dr Strickland for pt to take home med; also mentioned some nausea; tolerating po fluids well; aware zofran given iv in procedure room

## 2024-12-03 NOTE — DISCHARGE INSTRUCTIONS

## 2024-12-03 NOTE — ANESTHESIA PREPROCEDURE EVALUATION
Patient: Yamilex Ye    Procedure Information       Date/Time: 12/03/24 0930    Scheduled providers: Jameson Strickland DO    Procedures:       COLONOSCOPY      EGD    Location: [facility]            Relevant Problems   Anesthesia (within normal limits)      Cardiac   (+) Benign essential hypertension   (+) Dyslipidemia, goal LDL below 100      Pulmonary (within normal limits)      Neuro   (+) Situational anxiety   (+) Situational depression      GI   (+) Esophageal reflux   (+) Mixed irritable bowel syndrome      /Renal   (+) Hyponatremia      Liver   (+) Cholelithiasis   (+) Fatty infiltration of liver      Endocrine   (+) Hypothyroidism      Hematology   (+) Mild anemia      Musculoskeletal   (+) Localized osteoarthritis of hip   (+) Osteoarthritis, localized, knee      HEENT   (+) Hearing loss   (+) Sensorineural hearing loss (SNHL) of both ears      Skin   (+) Basal cell carcinoma   (+) Eczema       Clinical information reviewed:   Tobacco  Allergies  Meds   Med Hx  Surg Hx  OB Status  Fam Hx  Soc   Hx        NPO Detail:  NPO/Void Status  Carbohydrate Drink Given Prior to Surgery? : N  Date of Last Liquid: 12/03/24  Time of Last Liquid: 0530  Date of Last Solid: 12/01/24  Time of Last Solid: 2000  Last Intake Type: Clear fluids  Time of Last Void: 0920         Physical Exam    Airway  Mallampati: II  TM distance: >3 FB  Neck ROM: full     Cardiovascular   Rhythm: regular     Dental    Pulmonary   Breath sounds clear to auscultation     Abdominal            Anesthesia Plan    History of general anesthesia?: yes  History of complications of general anesthesia?: no    ASA 2     MAC     The patient is not a current smoker.    intravenous induction   Anesthetic plan and risks discussed with patient.

## 2024-12-09 ENCOUNTER — TELEPHONE (OUTPATIENT)
Dept: GASTROENTEROLOGY | Facility: CLINIC | Age: 74
End: 2024-12-09
Payer: MEDICARE

## 2024-12-09 NOTE — TELEPHONE ENCOUNTER
Patient called and said she needs to schedule virtual follow up visit with Dr. Strickland.  She had procedures done on 12/3/24.

## 2024-12-13 PROBLEM — D12.6 TUBULAR ADENOMA OF COLON: Status: ACTIVE | Noted: 2024-12-13

## 2024-12-13 LAB
LABORATORY COMMENT REPORT: NORMAL
PATH REPORT.FINAL DX SPEC: NORMAL
PATH REPORT.GROSS SPEC: NORMAL
PATH REPORT.RELEVANT HX SPEC: NORMAL
PATH REPORT.TOTAL CANCER: NORMAL

## 2024-12-17 ENCOUNTER — APPOINTMENT (OUTPATIENT)
Dept: GASTROENTEROLOGY | Facility: CLINIC | Age: 74
End: 2024-12-17
Payer: MEDICARE

## 2024-12-17 DIAGNOSIS — K52.832 LYMPHOCYTIC COLITIS: Primary | ICD-10-CM

## 2024-12-17 DIAGNOSIS — R19.7 DIARRHEA, UNSPECIFIED TYPE: ICD-10-CM

## 2024-12-17 PROCEDURE — 1157F ADVNC CARE PLAN IN RCRD: CPT | Performed by: STUDENT IN AN ORGANIZED HEALTH CARE EDUCATION/TRAINING PROGRAM

## 2024-12-17 PROCEDURE — 99214 OFFICE O/P EST MOD 30 MIN: CPT | Performed by: STUDENT IN AN ORGANIZED HEALTH CARE EDUCATION/TRAINING PROGRAM

## 2024-12-17 PROCEDURE — 1123F ACP DISCUSS/DSCN MKR DOCD: CPT | Performed by: STUDENT IN AN ORGANIZED HEALTH CARE EDUCATION/TRAINING PROGRAM

## 2024-12-17 RX ORDER — BUDESONIDE 3 MG/1
9 CAPSULE, COATED PELLETS ORAL EVERY MORNING
Qty: 90 CAPSULE | Refills: 1 | Status: SHIPPED | OUTPATIENT
Start: 2024-12-17 | End: 2025-12-17

## 2024-12-17 NOTE — PROGRESS NOTES
"CC: Follow-up.    History of Present Illness:   Yamilex Chan is a 73yo female with Sjogren's disease, nonspecific colitis, HTN, HLD, hypothyroidism who presents to clinic for follow-up. Patient still having diarrhea. Needs Lomotil as needed. Almost all food items trigger a diarrhea episode. No other concerns. Recent colonoscopy with concern for ?lymphocytic colitis.     EGD : Small HH, gastric erythema/polyps (HP).   Colonoscopy : normal TI, 12 polyps, diverticulosis, hemorrhoids. ?lymphocytic colitis.   Colonoscopy : Normal TI, diverticulosis focal active colitis in the ascending colon, otherwise biopsies are normal.    GI visit 2024: \"Patient is doing better with increased fiber supplementation and reduction of wine.  She does believe that she is now getting more constipated with the occasional blowout diarrhea episode.  However, her symptoms are much better compared to previous months.  She has no other significant concerns at this time.  Of note, her father  of gastric cancer.  She does complain of decreased appetite.  Weight is stable.  She has been working out more.\"    GI visit 2024: \"Patient has both diarrhea and constipation - but she is taking Lomotil daily (multiple tabs on most days). Patient is taking 2-4 fiber capsules. Patient is on a probiotic. No pop, juice. Some dairy products. Drinks 1-2 glasses of wine daily. Patient recently got her knee done. In PT still, + working out. Socializing and going out.\"    GI visit 3/2024: \"Patient states that she has not been doing well.  She has had increased bloating, urgency, diarrhea.  She has also been going through a lot of other medical issues including needing a knee replacement and multiple bouts of Mohs surgery.  Her knee issue has led to decreased activity and increased depression/anxiety.  She has not been able to work out or walk or get outside.  She is taking 4 Lomotil daily.  She takes fiber on occasion which seems to help her " "symptoms.  No other GI complaints at this time.  Last colonoscopy in 2021.\"    GI visit 9/2023: \"Patient states that she is doing fairly well. She still gets occasional diarrhea. She takes 1 Lomotil daily. Although, I think this is mostly out of fear of having an accident. She states the bloating has improved with fiber supplement. She is still working out with a . She is no longer following with the psychologist. No other concerns at this time.\"        GI visit 5/2023: \"Patient is again doing fairly well. She still has complaints of bloating, diarrhea, urgency, and nausea. + Abdominal fullness, early satiety. She admits that certain food items exacerbate her symptoms (especially the urgency). Seems to be that dairy plays a role. She takes Lomotil up to 4 tabs daily. She usually is doing this prior to workouts or going out for the day. She takes Gas-X for bloating. Zofran a couple times per month for nausea. I question whether there is constipation with overflow diarrhea. She is pretty happy with her current regimen. +weight gain. No blood in her stool. Has been going out with her daughter and drinking wine on occasion. Went to a wedding in [place].\"     GI visit 11/2022: \"Patient doing fairly well. Still gets occasional bloating.  passed away a few months back. She is doing better and becoming more active. No other concerns at this time.\"      GI visit 7/2022: \"Patient is doing well. She has had no diarrhea episodes since our last visit. She will occasionally get constipation with her Lomotil use. She takes 2 pills on most days. She will skip weekends and other days where she is not doing much. She mostly takes it out of fear of having an episode while out and about. Otherwise, no new issues. She was alerted by her 's hospice nurse that he likely only has a few weeks left to live. She is set to go to [place] tomorrow with family to visit her son. Seeing a psychologist which has helped out " "significantly. She is on Ativan which is helping.\"     GI visit 5/2022: \"Patient states she is not doing well. She is tangential and + pressured speech. As per below, we thought that her IBS with alternating bowel habits was likely more constipation related based on the KUB. We stopped the mesalamine as she was clearly still having symptoms while on it. We also attempted to start her on MiraLAX to clear out her bowels and reset the system. However, she only gave the MiraLAX several days and then went back to taking Lomotil and mesalamine as needed. It sounds as if she takes the Lomotil quite frequently. She will also take the MiraLAX as needed. She is also complaining of increased bloating and flatulence. She tried the low FODMAP diet but became very stressed out by it. Her sister is a nutritionist and agrees that it is a very difficult diet to follow. She still is having significant life stressors. No pop use. Limited dairy use (cheese). She is working with a . She states that she has lost 20 or so pounds in the last year and contributes this mostly to her work with a . However, she does admit to poor appetite and anorexia related to fear of eating (diarrhea, accident). No other issues at this time.\"     Per telephone call 2/23/2022: \"Patient underwent KUB to check fecal loading. KUB shows stool throughout the colon. I suspect the patient does not have microscopic colitis and actually suffers from IBS-M. Mesalamine is likely of little clinical utility. Furthermore, it is not necessarily better than placebo in microscopic colitis. Based on cost and my clinical suspicion, we will stop the mesalamine. We will also start MiraLAX and titrate as needed. We discussed the ups and downs associated with IBS. Patient is going to work on stress reduction and is scheduled to meet with a psychologist in April. She is going to message me in 3 to 4 weeks to let me know how she is doing.\"     GI Visit 2/2022: \"Yamilex " "Dustin is a 72yo female with Sjogren's disease, nonspecific colitis (on mesalamine), HTN, HLD, hypothyroidism who presents to clinic for follow-up of nonspecific colitis. She is a former patient of Dr. Herrmann. She has had multiple colonoscopies that showed nonspecific colitis on biopsies. First colonoscopy was in 2020 with random biopsies showing nonspecific colitis, consider medication versus infection. Her next colonoscopy was in 2021 and showed nonspecific colitis in the ascending colon only, consider bowel prep or medication induced. She is currently on mesalamine. She takes 1 tab twice daily. She also has Lomotil as needed. She states that her bowel habits have been alternating. They are mostly diarrhea, but she also suffers from constipation. 1 to 2 months ago she was having 1 formed bowel movement every 5 or so days. Lately, it has been mostly diarrhea. She will have several episodes per day. They are never at night. She does get some abdominal discomfort around the time of each bowel movement, that is relieved by having the bowel movement. She also endorses mild nausea with some of the episodes. She is under significant life stressors; her  is in a nursing home. She does believe that this could be playing a role. She has stopped all of her depression medications, and admits that her depression is an issue. No other complaints at this time.\"      Review of Systems  ROS Negative unless otherwise stated above.    Past Medical/Surgical History  Past Medical History:   Diagnosis Date    Anxiety     Basal cell carcinoma     Body mass index (BMI) 34.0-34.9, adult 07/20/2021    BMI 34.0-34.9,adult    Cataract     Depression     Encounter for general adult medical examination without abnormal findings 06/01/2018    Welcome to Medicare preventive visit    Encounter for general adult medical examination without abnormal findings 04/21/2017    Encounter for preventive health examination    Fatty (change of) " liver, not elsewhere classified     Fatty liver    GERD (gastroesophageal reflux disease)     Hypertension     Hypothyroidism     Influenza due to other identified influenza virus with other respiratory manifestations 03/25/2016    Influenza A    Other conditions influencing health status     Skin Cancer    Other general symptoms and signs 03/25/2016    Flu-like symptoms    Other injury of unspecified body region, initial encounter     Open wound    Personal history of other diseases of the digestive system     History of small bowel obstruction    Personal history of other diseases of the digestive system     History of cholelithiasis    Personal history of other diseases of the digestive system     History of esophageal reflux    Personal history of other diseases of the digestive system     History of intestinal obstruction    Personal history of other endocrine, nutritional and metabolic disease     History of hypokalemia    Personal history of other infectious and parasitic diseases 02/28/2020    History of Clostridioides difficile colitis    Personal history of other specified conditions     History of fibrocystic disease of breast    Unilateral primary osteoarthritis, right knee 03/01/2024    Unspecified fracture of shaft of humerus, unspecified arm, initial encounter for closed fracture     Closed fracture of humerus    Urinary frequency       Past Surgical History:   Procedure Laterality Date    APPENDECTOMY  12/12/2014    Appendectomy    COLONOSCOPY  12/27/2012    Complete Colonoscopy    OTHER SURGICAL HISTORY  05/10/2019    Lumbar discectomy    OTHER SURGICAL HISTORY  12/12/2014    Anterior Colporrhaphy, Repair Of Cystocele    OTHER SURGICAL HISTORY  12/12/2014    Oophorectomy - Bilateral (Removal Of Both Ovaries)    OTHER SURGICAL HISTORY  12/12/2014    Adjacent Tissue Transfer    SKIN CANCER EXCISION  04/16/2016    Mohs Micrographic Surgery Face    SMALL INTESTINE SURGERY  12/12/2014    Small Bowel  Resection    SPINAL FUSION      TOTAL VAGINAL HYSTERECTOMY  12/12/2014    Vaginal Hysterectomy        Social History   reports that she has quit smoking. Her smoking use included cigarettes. She has never used smokeless tobacco. She reports current alcohol use of about 2.0 - 3.0 standard drinks of alcohol per week.     Family History  family history includes Diabetes in her father; Hypertension in her father and mother.     Allergies  Allergies   Allergen Reactions    Adhesive Tape-Silicones Rash       Medications  Current Outpatient Medications   Medication Instructions    atorvastatin (LIPITOR) 10 mg, oral, Daily, For cholesterol    cholecalciferol (Vitamin D-3) 50 mcg (2,000 unit) capsule 2 tablets, Daily    cyanocobalamin (Vitamin B-12) 1,000 mcg tablet TAKE ONE TABLET BY MOUTH DAILY AS DIRECTED FOR B-12 INSUFFICIENCY    diphenoxylate-atropine (Lomotil) 2.5-0.025 mg tablet 2 tablets, oral, 3 times daily PRN    escitalopram (LEXAPRO) 20 mg, oral, Daily    ferrous sulfate, 325 mg ferrous sulfate, (FerrouSuL) tablet 1 tablet, oral, Daily with breakfast    levothyroxine (SYNTHROID, LEVOXYL) 125 mcg, oral, Daily before breakfast    lisinopril 10 mg, oral, Daily, For blood pressure    LORazepam (ATIVAN) 0.5 mg, oral, Daily PRN    metroNIDAZOLE (Metrogel) 0.75 % gel 1 Application 2 times a day.    omeprazole (PRILOSEC) 40 mg, oral, 2 times daily    ondansetron (Zofran) 4 mg tablet 1 tablet, Every 8 hours PRN    polypodium leucotomos extract (Heliocare) 240 mg capsule 1 capsule, Daily    tiZANidine (ZANAFLEX) 4 mg, oral, Every 8 hours PRN    white petrolatum-mineral oiL 94-3 % ophthalmic ointment 1 Application, As needed        Objective   General: A&Ox3, NAD.  HEENT: AT/NC.   Skin: No Jaundice.   Neuro: No focal deficits.   Psych: Normal mood and affect.     Lab Results   Component Value Date    WBC 9.4 06/05/2024    HGB 12.9 06/05/2024    HCT 37.5 06/05/2024     (H) 06/05/2024     06/05/2024        Chemistry    Lab Results   Component Value Date/Time     (L) 10/15/2024 0842    K 4.5 10/15/2024 0842    CL 96 (L) 10/15/2024 0842    CO2 26 10/15/2024 0842    BUN 6 10/15/2024 0842    CREATININE 0.71 10/15/2024 0842    Lab Results   Component Value Date/Time    CALCIUM 9.4 10/15/2024 0842    ALKPHOS 123 04/05/2024 1130    AST 35 04/05/2024 1130    ALT 26 10/15/2024 0842    BILITOT 0.9 04/05/2024 1130             ASSESSMENT/PLAN  Yamilex Chan is a 73yo female with Sjogren's disease, nonspecific colitis, HTN, HLD, prediabetes, GERD, colon polyps, BCC, hypothyroidism, depression/anxiety who presents to clinic for follow-up.  KUB with no significant stool burden. Colonoscopy with possible lymphocytic colitis. Never got previous stool studies.     -Start Budesonide for possible lymphocytic colitis.   -Continue lomotil as needed.  -Continue fiber supplementation.  -Discussed the importance of strict avoidance of dairy, pop, concentrated sugars.   -Continue to hold drinking wine (was drinking 1-2 glasses daily).  -Surveillance colonoscopy in 12/2025.   -Surveillance EGD in 12/2025 (HP polyps and family history of gastric cancer).   -Patient should consider following back up with psychology/psychiatry for management of depression and anxiety.  -Advised patient to work on increasing exercise, stretching, stress reduction.    Total video visit time: 11 minutes.    Jameson Strickland, DO

## 2024-12-23 DIAGNOSIS — R14.0 ABDOMINAL BLOATING: Primary | ICD-10-CM

## 2024-12-24 ENCOUNTER — HOSPITAL ENCOUNTER (OUTPATIENT)
Dept: RADIOLOGY | Facility: CLINIC | Age: 74
Discharge: HOME | End: 2024-12-24
Payer: MEDICARE

## 2024-12-24 ENCOUNTER — APPOINTMENT (OUTPATIENT)
Dept: RADIOLOGY | Facility: CLINIC | Age: 74
End: 2024-12-24
Payer: MEDICARE

## 2024-12-24 ENCOUNTER — LAB (OUTPATIENT)
Dept: LAB | Facility: LAB | Age: 74
End: 2024-12-24
Payer: MEDICARE

## 2024-12-24 DIAGNOSIS — R73.03 PREDIABETES: ICD-10-CM

## 2024-12-24 DIAGNOSIS — E78.5 DYSLIPIDEMIA, GOAL LDL BELOW 100: ICD-10-CM

## 2024-12-24 DIAGNOSIS — R14.0 ABDOMINAL BLOATING: ICD-10-CM

## 2024-12-24 LAB
ANION GAP SERPL CALC-SCNC: 13 MMOL/L (ref 10–20)
BUN SERPL-MCNC: 12 MG/DL (ref 6–23)
CALCIUM SERPL-MCNC: 9.1 MG/DL (ref 8.6–10.3)
CHLORIDE SERPL-SCNC: 96 MMOL/L (ref 98–107)
CO2 SERPL-SCNC: 27 MMOL/L (ref 21–32)
CREAT SERPL-MCNC: 0.68 MG/DL (ref 0.5–1.05)
EGFRCR SERPLBLD CKD-EPI 2021: >90 ML/MIN/1.73M*2
EST. AVERAGE GLUCOSE BLD GHB EST-MCNC: 105 MG/DL
GLUCOSE SERPL-MCNC: 103 MG/DL (ref 74–99)
HBA1C MFR BLD: 5.3 %
POTASSIUM SERPL-SCNC: 4.3 MMOL/L (ref 3.5–5.3)
SODIUM SERPL-SCNC: 132 MMOL/L (ref 136–145)
TSH SERPL-ACNC: 2.09 MIU/L (ref 0.44–3.98)

## 2024-12-24 PROCEDURE — 84443 ASSAY THYROID STIM HORMONE: CPT

## 2024-12-24 PROCEDURE — 76700 US EXAM ABDOM COMPLETE: CPT

## 2024-12-24 PROCEDURE — 76700 US EXAM ABDOM COMPLETE: CPT | Performed by: RADIOLOGY

## 2024-12-24 PROCEDURE — 80048 BASIC METABOLIC PNL TOTAL CA: CPT

## 2024-12-28 DIAGNOSIS — K76.0 HEPATIC STEATOSIS: Primary | ICD-10-CM

## 2025-01-13 DIAGNOSIS — E78.5 DYSLIPIDEMIA, GOAL LDL BELOW 100: ICD-10-CM

## 2025-01-13 RX ORDER — ATORVASTATIN CALCIUM 10 MG/1
10 TABLET, FILM COATED ORAL DAILY
Qty: 90 TABLET | Refills: 3 | Status: SHIPPED | OUTPATIENT
Start: 2025-01-13

## 2025-01-14 ENCOUNTER — HOSPITAL ENCOUNTER (OUTPATIENT)
Dept: RADIOLOGY | Facility: CLINIC | Age: 75
Discharge: HOME | End: 2025-01-14
Payer: MEDICARE

## 2025-01-14 ENCOUNTER — APPOINTMENT (OUTPATIENT)
Dept: RADIOLOGY | Facility: CLINIC | Age: 75
End: 2025-01-14
Payer: MEDICARE

## 2025-01-14 VITALS — WEIGHT: 183 LBS | HEIGHT: 63 IN | BODY MASS INDEX: 32.43 KG/M2

## 2025-01-14 DIAGNOSIS — Z12.31 ENCOUNTER FOR SCREENING MAMMOGRAM FOR BREAST CANCER: ICD-10-CM

## 2025-01-14 PROCEDURE — 77063 BREAST TOMOSYNTHESIS BI: CPT | Performed by: STUDENT IN AN ORGANIZED HEALTH CARE EDUCATION/TRAINING PROGRAM

## 2025-01-14 PROCEDURE — 77067 SCR MAMMO BI INCL CAD: CPT

## 2025-01-14 PROCEDURE — 77067 SCR MAMMO BI INCL CAD: CPT | Performed by: STUDENT IN AN ORGANIZED HEALTH CARE EDUCATION/TRAINING PROGRAM

## 2025-01-19 NOTE — RESULT ENCOUNTER NOTE
Rosaline  - Thank you for doing the annual mammogram. The radiologist reports no worrisome findings. Please continue monthly self breast exams, as well as annual mammograms. Please contact me with any concerns.     Sincerely,     Bhavik Tristan MD

## 2025-01-28 ENCOUNTER — APPOINTMENT (OUTPATIENT)
Dept: PRIMARY CARE | Facility: CLINIC | Age: 75
End: 2025-01-28
Payer: MEDICARE

## 2025-01-28 VITALS
SYSTOLIC BLOOD PRESSURE: 152 MMHG | WEIGHT: 186.8 LBS | OXYGEN SATURATION: 98 % | HEART RATE: 60 BPM | BODY MASS INDEX: 33.1 KG/M2 | HEIGHT: 63 IN | DIASTOLIC BLOOD PRESSURE: 82 MMHG

## 2025-01-28 DIAGNOSIS — K80.20 CALCULUS OF GALLBLADDER WITHOUT CHOLECYSTITIS WITHOUT OBSTRUCTION: ICD-10-CM

## 2025-01-28 DIAGNOSIS — E87.1 HYPONATREMIA: ICD-10-CM

## 2025-01-28 DIAGNOSIS — J30.1 SEASONAL ALLERGIC RHINITIS DUE TO POLLEN: ICD-10-CM

## 2025-01-28 DIAGNOSIS — R73.03 PREDIABETES: ICD-10-CM

## 2025-01-28 DIAGNOSIS — I10 ESSENTIAL HYPERTENSION WITH GOAL BLOOD PRESSURE LESS THAN 130/85: ICD-10-CM

## 2025-01-28 DIAGNOSIS — F43.21 SITUATIONAL DEPRESSION: ICD-10-CM

## 2025-01-28 DIAGNOSIS — C44.310 BASAL CELL CARCINOMA (BCC) OF SKIN OF FACE, UNSPECIFIED PART OF FACE: ICD-10-CM

## 2025-01-28 DIAGNOSIS — E78.5 DYSLIPIDEMIA, GOAL LDL BELOW 100: ICD-10-CM

## 2025-01-28 DIAGNOSIS — E03.9 ACQUIRED HYPOTHYROIDISM: ICD-10-CM

## 2025-01-28 DIAGNOSIS — I10 BENIGN ESSENTIAL HYPERTENSION: Primary | ICD-10-CM

## 2025-01-28 DIAGNOSIS — K76.0 FATTY INFILTRATION OF LIVER: ICD-10-CM

## 2025-01-28 DIAGNOSIS — K21.9 GASTROESOPHAGEAL REFLUX DISEASE WITHOUT ESOPHAGITIS: ICD-10-CM

## 2025-01-28 DIAGNOSIS — K52.9 COLITIS, ACUTE: ICD-10-CM

## 2025-01-28 DIAGNOSIS — I10 PRIMARY HYPERTENSION: ICD-10-CM

## 2025-01-28 DIAGNOSIS — E53.8 VITAMIN B12 DEFICIENCY: ICD-10-CM

## 2025-01-28 DIAGNOSIS — F33.8 SEASONAL AFFECTIVE DISORDER (CMS-HCC): ICD-10-CM

## 2025-01-28 DIAGNOSIS — E55.9 VITAMIN D DEFICIENCY: ICD-10-CM

## 2025-01-28 DIAGNOSIS — Z96.651 TOTAL KNEE REPLACEMENT STATUS, RIGHT: ICD-10-CM

## 2025-01-28 DIAGNOSIS — H34.8392 BRANCH RETINAL VEIN OCCLUSION, UNSPECIFIED COMPLICATION STATUS, UNSPECIFIED LATERALITY (CMS-HCC): ICD-10-CM

## 2025-01-28 DIAGNOSIS — Z97.3 WEARS GLASSES: ICD-10-CM

## 2025-01-28 PROCEDURE — 1157F ADVNC CARE PLAN IN RCRD: CPT | Performed by: INTERNAL MEDICINE

## 2025-01-28 PROCEDURE — 1036F TOBACCO NON-USER: CPT | Performed by: INTERNAL MEDICINE

## 2025-01-28 PROCEDURE — 3008F BODY MASS INDEX DOCD: CPT | Performed by: INTERNAL MEDICINE

## 2025-01-28 PROCEDURE — 3077F SYST BP >= 140 MM HG: CPT | Performed by: INTERNAL MEDICINE

## 2025-01-28 PROCEDURE — 3079F DIAST BP 80-89 MM HG: CPT | Performed by: INTERNAL MEDICINE

## 2025-01-28 PROCEDURE — 99214 OFFICE O/P EST MOD 30 MIN: CPT | Performed by: INTERNAL MEDICINE

## 2025-01-28 PROCEDURE — 1160F RVW MEDS BY RX/DR IN RCRD: CPT | Performed by: INTERNAL MEDICINE

## 2025-01-28 PROCEDURE — G2211 COMPLEX E/M VISIT ADD ON: HCPCS | Performed by: INTERNAL MEDICINE

## 2025-01-28 PROCEDURE — 1123F ACP DISCUSS/DSCN MKR DOCD: CPT | Performed by: INTERNAL MEDICINE

## 2025-01-28 PROCEDURE — 1159F MED LIST DOCD IN RCRD: CPT | Performed by: INTERNAL MEDICINE

## 2025-01-28 RX ORDER — LISINOPRIL 20 MG/1
20 TABLET ORAL DAILY
Qty: 90 TABLET | Refills: 3 | Status: SHIPPED | OUTPATIENT
Start: 2025-01-28 | End: 2026-01-28

## 2025-01-28 NOTE — PROGRESS NOTES
"Subjective   Patient ID: Yamilex Ye is a 74 y.o. female who presents for Follow-up.    Here for quarterly follow-up  She is on budesonide for her colitis-her diarrhea has improved though not back to normal.  She now follows with a therapist for depression and anxiety.  She is volunteering once or twice a month at the [employer].  She gets to the gym to get out, 3 times weekly  Looking forward to going to [place] in March to see her brother who has been ill.  Good friend, Sharda has had lots of issues-she has become more frail-unfortunately her son Irwin just passed away suddenly.  Another friend recently had a cardiac event  The winter has been a bit difficult         Review of Systems    Objective   /82   Pulse 60   Ht 1.6 m (5' 3\")   Wt 84.7 kg (186 lb 12.8 oz)   SpO2 98%   BMI 33.09 kg/m²     Physical Exam  Vitals reviewed.   Constitutional:       Appearance: Normal appearance.      Comments: Symmetric gait though quite cautious-required some assistance on and off the exam table   HENT:      Head: Normocephalic and atraumatic.   Eyes:      General: No scleral icterus.        Right eye: No discharge.         Left eye: No discharge.      Extraocular Movements: Extraocular movements intact.      Conjunctiva/sclera: Conjunctivae normal.      Pupils: Pupils are equal, round, and reactive to light.   Cardiovascular:      Rate and Rhythm: Normal rate and regular rhythm.      Pulses: Normal pulses.      Heart sounds: Normal heart sounds. No murmur heard.  Pulmonary:      Effort: Pulmonary effort is normal.      Breath sounds: Normal breath sounds. No wheezing or rhonchi.   Musculoskeletal:         General: No deformity or signs of injury. Normal range of motion.      Cervical back: Normal range of motion and neck supple. No rigidity or tenderness.   Lymphadenopathy:      Cervical: No cervical adenopathy.   Skin:     General: Skin is warm and dry.      Findings: No rash.   Neurological:      General: No " focal deficit present.      Mental Status: She is alert and oriented to person, place, and time. Mental status is at baseline.      Cranial Nerves: No cranial nerve deficit.      Sensory: No sensory deficit.      Gait: Gait normal.   Psychiatric:         Mood and Affect: Mood normal.         Behavior: Behavior normal.         Thought Content: Thought content normal.         Judgment: Judgment normal.         Assessment/Plan   Problem List Items Addressed This Visit             ICD-10-CM    Allergic rhinitis J30.9    Basal cell carcinoma C44.91    BRVO (branch retinal vein occlusion) (CMS-HCC) H34.8392    Cholelithiasis K80.20    Colitis, acute K52.9    Esophageal reflux K21.9    Fatty infiltration of liver K76.0    Dyslipidemia, goal LDL below 100 E78.5    Hypothyroidism E03.9    Situational depression F43.21    Vitamin B12 deficiency E53.8    Vitamin D deficiency E55.9    Wears glasses Z97.3    Hyponatremia E87.1    Essential hypertension with goal blood pressure less than 130/85 - Primary I10    Relevant Medications    lisinopril 20 mg tablet    Prediabetes R73.03    Total knee replacement status, right Z96.651    Seasonal affective disorder (CMS-HCC) F33.8           Portions of this encounter note have been copied from my previous note dated  10/24/24 , which have been updated where appropriate and all reflect my current medical decision making from today.     Benzodiazepines:  What is the patient's goal of therapy?   Improve anxiety  Is this being achieved with current treatment? Yes, with medication      CSA:  7/11/2024  PDMP:   1/28/2025  UDS:  9/14/23   ADRIANE-7:   5/22/23  (score =3)    Activities of Daily Living:   Is your overall impression that this patient is benefiting (symptom reduction outweighs side effects) from benzodiazepine therapy? Yes     1. Physical Functioning: Better  2. Family Relationship: Better  3. Social Relationship: Better  4. Mood: Better  5. Sleep Patterns: Better  6. Overall Function:  Better    Situational anxiety / depression-very difficult for several years with her 's dementia declining course and subsequent passing. Support provided. She will continue the additional Lexapro as ordered. Her   mid 2022.           Doing better, but lonely often. Hard to meet companions.  She no longer sees her counselor. Support provided.  She does feel lonely.  She does not want to do anything in medical she states.  Suggested volunteering with people as she enjoys being around people.            3/24-it has been a challenging winter.  First she had multiple problems with her 3 facial Moh's procedures within 4 months since December, now she has to have her knee replaced, her family has been supportive she is looking forward to [facility] but it has been challenging alone since her                10/24-doing a bit better-volunteers at the [employer], she enjoys her pet dog- Farhad, walking 2-3 times weekly.  Looking forward to going out to [place] to see her granddaughters high school graduation before she begins college studying art              She now follows with a therapist for depression and anxiety. She is volunteering once or twice a month at the [employer].  She gets to the gym to get out, 3 times weekly  Looking forward to going to [place] in March to see her brother who has been ill. Good friend, Sharda has had lots of issues-she has become more frail-unfortunately her son Irwin just passed away suddenly.  Another friend recently had a cardiac event  The winter has been a bit difficult        Living situation-she is -lives alone with her dog Farhad.  She lives in a one-story home.  Her 2 daughters live in town    S/p      Right Total knee replacement  24 w/  Dr. Newman.  Doing much better.  She went through outpatient physical therapy with her therapist, Presley            -she has been cleared to get back into the pool.  She has been walking her new  dog more as well           10/24-presently walking 2 to 3 days weekly      Hypertension-she will continue lisinopril            BP improved on recheck.             1/25-blood pressure elevated-she will advance the lisinopril and get a home blood pressure machine-suggested a reputable model to get and then she will follow her blood pressure at home and call if consistently over 130/80.  She will call in 4 weeks with her blood pressure readings that she does after lunch or after dinner alternating 3-4 times weekly.  Instructions written out to optimize compliance.    Hyponatremia- mild; will follow             11/23-sodium 127-a bit down from 131 in May 2023                10/24-sodium 132    Dyslipidemia-tolerating low-dose atorvastatin.  Goal LDL under 100             11/23-.  For now we will continue              10/24-LDL 91-    Prediabetes/elevated weight            11/23-fasting blood sugar 124, A1c 5.8%.  BMI 34.4 she will meet with our pharmacy team to consider one of the newer medications           10/24-fasting glucose 125, but A1c 5.1%.  BMI 32.9.  Will continue to follow     Elevated 10 year cardiac risk assessment- 10 year cardiac risk assessment at 9.9% January 2019 reduces to 7.4% taking statin, 5.4% with statin and blood pressure Rx. She will continue lisinopril and atorvastatin. She will continue exercise when her schedule permits     Exercise routine-she will advance as tolerated.              She has been using a - Vel, and hopes to restart Silver Sneakers when she is able tolerate it.  In the past-encouraged water walking 3 x wk at [facility]'s pool              10/24-walking 2 to 3 days weekly with her dog              1/25-getting to the gym 3 times weekly    Lymphedema- occasional while flying. Uses compression hoses. Not problematic at this time.      Hypothyroidism-we'll continue to follow thyroid labs with present thyroid supplement prescription.               10/24-TSH elevated but FTI normal.  Will plan to recheck  1/25-will continue to follow            Mild anemia-she will continue her iron and B12 and will reassess labs before follow-up            Improved on recheck-4/24 likely a postop issue after her knee replacement    Left knee injury-November 2022-she has worked with Dr. Israel who did an MRI which shows nondisplaced tibial fracture at the insertion of the PCL, possibly a meniscal tear as well as patellar arthritis. She is doing physical therapy. Presently using a cane. She will continue caution. Handicap parking placard provided January 2023               Colitis/history of IBS / Recurrent diarrhea alternating with constipation- initially diagnosed as colitis- noted on early 2020 colonoscopy per Dr. Ibarra. Lexapro was discontinued. She has used Lomotil as needed from Dr. Ibarra. Colonoscopy updated January 2021- significantly improved she states.  With less stress, there is less diarrhea she notes. Overall improved she still uses the Lomotil occasionally. Encouraged dietary caution with her upcoming plans to visit a [employer] restaurant.                 She saw Dr. Strickland early May '23. He rec'd more metamucil - now on 2 cap/day. Colonoscopy planned for 2024. Mornings still challenging w/ am diarrhea.  She uses Lomotil-she will get refills from him.  Anticipates a colonoscopy in the spring 2024              She continues Lomotil as needed and we will see him in 3/24             10/24-she is still bothered by intermittent diarrhea-EGD and C-scope planned 12/24 with Dr. Strickland.  She gave up red wine to avoid sulfites.  Recently she had several bites of a pot pie which put her in the bathroom with diarrhea for a while.  Encouraged her to [place] back with her gastro doctor as this appears to be more of a trigger rather than say a lactose intolerance issue.  Lomotil refill requested/provided             1/25-she is on budesonide with some improved symptoms.   She will continue to follow with her gastro provider    Acid reflux-stable with PPI- which she will continue especially as she is on the naproxen           Anticipate EGD in 2024.  Smaller meals help             EGD scheduled for 12/24.     Cholelithiasis- asymptomatic-noted on 2012 CT             10/24-interestingly recently she had prolonged diarrhea after eating several bites of a pot pie from a restaurant suggesting that maybe there is more of a issue with the gallstone.  Ultrasound and HIDA scan ordered              1/25-HIDA unremarkable.  Not having cholecystitis symptoms     Family history of colon cancer-she will continue colonoscopy care as below-at least every 5 years.    Colonoscopy updated January 2021. Next in 3 years-January 2024            She met w/ Dr. Strickland 3/24; she'll see him back at 6 mo, 9/24.                 Colonoscopy scheduled 12/24    History of Right lower quadrant abdominal wall hernia-surgery w/ Dr. Ravindra Louise, and Dr. Finnegan at [facility] Feb. 18. Improved       Lumbar spasms-mainly in the SI joint area-improved after working with her physical therapist, Presley for exercises    S/p lumbar surgery 4/17/19 per Dr. Ward for L 5 cyst--then her second lumbar surgery July 2020 laminectomy. Ongoing pain has been challenging. She will continue exercises, and follow-up with her physical therapist Presley as needed. She will see Dr. Ward as well. She will continue stretching.   X-rays completed per Dr. Ward. Unremarkable.         Gynecologic care / history of vaginal hysterectomy she will follow-up with GYN as needed     Annual mammogram- updated January 2024 January 2025 mammogram ordered     Vitamin D deficiency- encouraged patient to continue vitamin D daily as ordered. Will consider vitamin D level regularly.     Vitamin B12 deficiency- improved on 01/23 labs.             B 12 still high in 5/23 - she'll reduce B 12 to every other day     Seasonal allergies-she will  use Zyrtec seasonally as needed        History of skin cancer/rosacea-  history of Mohs surgery, she understands importance of sunscreen   She continues to follow at [facility]-now working with Dr. Connie Kelly who does laser treatments on her cheeks mainly presently.             12/23-she has had Mohs surgery on her chin area which has been complicated by dehiscence requiring debridement.  She will continue to work with the dermatology department at [facility].  She states that she has a Mohs procedure between her eyebrows in January 2024              3/24; s/p 3 Mohs on face since 12/23 per Sarahi Tabor MD at [facility]. Follow up soon, w/ facial derm abraision soon             1/25-she continues to follow with dermatology at [facility] both with a general dermatologist-Dr. Connie Dominguez and her mohs MD. anticipating topical 5-FU for her nose lesion after her light therapy treatment for rosacea        Ophthalmology care-she will see Dr. Cade as scheduled. Sadly her right eye has chronically poor vision from a remote retinal vein occlusion. The left eye has dry eyes and she may need a keratotomy and contact lens implanted permanently.  Right cataract surgery completed 12/22, with noticeably better vision. Left scheduled for 2/23. Left blepharoplasty  3/23 per Dr. Lloyd.             Vision improved and she is pleased with the results at this. She will continue with Dr. Cade- [place]              10/24-left corneal irritation continues-she will continue to follow-up with Dr. Long     Hearing concerns- audiology evaluation with audiology and ENT completed 12/22.      Flu shot each fall- updated 9/24     Covid series-completed. Covid Booster completed 9/24  RSV vaccination completed 7/24     Shingrix series completed in 2020    Tdap booster 7/24     Follow-up in 3 months, sooner with concerns     Charting was completed using voice recognition technology and may include unintended  errors.

## 2025-01-29 PROBLEM — F33.8 SEASONAL AFFECTIVE DISORDER (CMS-HCC): Status: ACTIVE | Noted: 2025-01-29

## 2025-01-29 PROBLEM — K61.0 PERIANAL ABSCESS: Status: RESOLVED | Noted: 2023-05-17 | Resolved: 2025-01-29

## 2025-02-04 DIAGNOSIS — I10 PRIMARY HYPERTENSION: ICD-10-CM

## 2025-02-04 RX ORDER — LISINOPRIL 40 MG/1
40 TABLET ORAL DAILY
Qty: 90 TABLET | Refills: 3 | Status: SHIPPED | OUTPATIENT
Start: 2025-02-04 | End: 2026-02-04

## 2025-02-09 ENCOUNTER — PATIENT MESSAGE (OUTPATIENT)
Dept: GASTROENTEROLOGY | Facility: CLINIC | Age: 75
End: 2025-02-09
Payer: MEDICARE

## 2025-02-09 DIAGNOSIS — K52.832 LYMPHOCYTIC COLITIS: ICD-10-CM

## 2025-02-10 RX ORDER — BUDESONIDE 3 MG/1
9 CAPSULE, COATED PELLETS ORAL EVERY MORNING
Qty: 90 CAPSULE | Refills: 0 | Status: SHIPPED | OUTPATIENT
Start: 2025-02-10 | End: 2026-02-10

## 2025-03-10 ENCOUNTER — OFFICE VISIT (OUTPATIENT)
Dept: GASTROENTEROLOGY | Facility: CLINIC | Age: 75
End: 2025-03-10
Payer: MEDICARE

## 2025-03-10 DIAGNOSIS — K52.832 LYMPHOCYTIC COLITIS: ICD-10-CM

## 2025-03-10 PROCEDURE — 99213 OFFICE O/P EST LOW 20 MIN: CPT | Performed by: STUDENT IN AN ORGANIZED HEALTH CARE EDUCATION/TRAINING PROGRAM

## 2025-03-10 PROCEDURE — G2211 COMPLEX E/M VISIT ADD ON: HCPCS | Performed by: STUDENT IN AN ORGANIZED HEALTH CARE EDUCATION/TRAINING PROGRAM

## 2025-03-10 PROCEDURE — 1123F ACP DISCUSS/DSCN MKR DOCD: CPT | Performed by: STUDENT IN AN ORGANIZED HEALTH CARE EDUCATION/TRAINING PROGRAM

## 2025-03-10 PROCEDURE — 1157F ADVNC CARE PLAN IN RCRD: CPT | Performed by: STUDENT IN AN ORGANIZED HEALTH CARE EDUCATION/TRAINING PROGRAM

## 2025-03-10 RX ORDER — BUDESONIDE 3 MG/1
6 CAPSULE, COATED PELLETS ORAL EVERY MORNING
Qty: 60 CAPSULE | Refills: 2 | Status: SHIPPED | OUTPATIENT
Start: 2025-03-10 | End: 2026-03-10

## 2025-03-10 NOTE — PROGRESS NOTES
CC: Follow-up.    History of Present Illness:   Yamilex Chan is a 75yo female with Sjogren's disease, nonspecific colitis, HTN, HLD, hypothyroidism who presents to clinic for follow-up of microscopic colitis. Patient has been on Budesonide for over 2 months. She is doing well. Rare diarrhea with eating the wrong item. She will still take imodium if traveling. Patient is seeing phycology. Patient is exercising regularly and PT. Both are helping her stress levels.     EGD 2024: Small HH, gastric erythema/polyps (HP).   Colonoscopy 2024: normal TI, 12 polyps, diverticulosis, hemorrhoids. ?lymphocytic colitis.   Colonoscopy 2021: Normal TI, diverticulosis focal active colitis in the ascending colon, otherwise biopsies are normal.    Review of Systems  ROS Negative unless otherwise stated above.    Past Medical/Surgical History  Past Medical History:   Diagnosis Date    Anxiety     Basal cell carcinoma     Body mass index (BMI) 34.0-34.9, adult 07/20/2021    BMI 34.0-34.9,adult    Cataract     Depression     Encounter for general adult medical examination without abnormal findings 06/01/2018    Welcome to Medicare preventive visit    Encounter for general adult medical examination without abnormal findings 04/21/2017    Encounter for preventive health examination    Fatty (change of) liver, not elsewhere classified     Fatty liver    GERD (gastroesophageal reflux disease)     Hypertension     Hypothyroidism     Influenza due to other identified influenza virus with other respiratory manifestations 03/25/2016    Influenza A    Other conditions influencing health status     Skin Cancer    Other general symptoms and signs 03/25/2016    Flu-like symptoms    Other injury of unspecified body region, initial encounter     Open wound    Perianal abscess 05/17/2023    Personal history of other diseases of the digestive system     History of small bowel obstruction    Personal history of other diseases of the digestive system      History of cholelithiasis    Personal history of other diseases of the digestive system     History of esophageal reflux    Personal history of other diseases of the digestive system     History of intestinal obstruction    Personal history of other endocrine, nutritional and metabolic disease     History of hypokalemia    Personal history of other infectious and parasitic diseases 02/28/2020    History of Clostridioides difficile colitis    Personal history of other specified conditions     History of fibrocystic disease of breast    Unilateral primary osteoarthritis, right knee 03/01/2024    Unspecified fracture of shaft of humerus, unspecified arm, initial encounter for closed fracture     Closed fracture of humerus    Urinary frequency       Past Surgical History:   Procedure Laterality Date    APPENDECTOMY  12/12/2014    Appendectomy    COLONOSCOPY  12/27/2012    Complete Colonoscopy    OOPHORECTOMY  2004    OTHER SURGICAL HISTORY  05/10/2019    Lumbar discectomy    OTHER SURGICAL HISTORY  12/12/2014    Anterior Colporrhaphy, Repair Of Cystocele    OTHER SURGICAL HISTORY  12/12/2014    Oophorectomy - Bilateral (Removal Of Both Ovaries)    OTHER SURGICAL HISTORY  12/12/2014    Adjacent Tissue Transfer    SKIN CANCER EXCISION  04/16/2016    Mohs Micrographic Surgery Face    SMALL INTESTINE SURGERY  12/12/2014    Small Bowel Resection    SPINAL FUSION      TOTAL VAGINAL HYSTERECTOMY  12/12/2014    Vaginal Hysterectomy        Social History   reports that she has quit smoking. Her smoking use included cigarettes. She has never used smokeless tobacco. She reports current alcohol use of about 2.0 - 3.0 standard drinks of alcohol per week.     Family History  family history includes Diabetes in her father; Hypertension in her father and mother.     Allergies  Allergies   Allergen Reactions    Adhesive Tape-Silicones Rash       Medications  Current Outpatient Medications   Medication Instructions    atorvastatin  (LIPITOR) 10 mg, oral, Daily, For cholesterol    budesonide EC (ENTOCORT EC) 9 mg, oral, Every morning    cholecalciferol (Vitamin D-3) 50 mcg (2,000 unit) capsule 2 tablets, Daily    cyanocobalamin (Vitamin B-12) 1,000 mcg tablet TAKE ONE TABLET BY MOUTH DAILY AS DIRECTED FOR B-12 INSUFFICIENCY    diphenoxylate-atropine (Lomotil) 2.5-0.025 mg tablet 2 tablets, oral, 3 times daily PRN    escitalopram (LEXAPRO) 20 mg, oral, Daily    ferrous sulfate, 325 mg ferrous sulfate, (FerrouSuL) tablet 1 tablet, oral, Daily with breakfast    levothyroxine (SYNTHROID, LEVOXYL) 125 mcg, oral, Daily before breakfast    lisinopril 40 mg, oral, Daily, Each morning For blood pressure    LORazepam (ATIVAN) 0.5 mg, oral, Daily PRN    metroNIDAZOLE (Metrogel) 0.75 % gel 1 Application 2 times a day.    omeprazole (PRILOSEC) 40 mg, oral, 2 times daily    ondansetron (Zofran) 4 mg tablet 1 tablet, Every 8 hours PRN    polypodium leucotomos extract (Heliocare) 240 mg capsule 1 capsule, Daily    tiZANidine (ZANAFLEX) 4 mg, oral, Every 8 hours PRN    white petrolatum-mineral oiL 94-3 % ophthalmic ointment 1 Application, As needed        Objective   General: A&Ox3, NAD.  HEENT: AT/NC.   Skin: No Jaundice.   Neuro: No focal deficits.   Psych: Normal mood and affect.     Lab Results   Component Value Date    WBC 9.4 06/05/2024    HGB 12.9 06/05/2024    HCT 37.5 06/05/2024     (H) 06/05/2024     06/05/2024       Chemistry    Lab Results   Component Value Date/Time     (L) 12/24/2024 1055    K 4.3 12/24/2024 1055    CL 96 (L) 12/24/2024 1055    CO2 27 12/24/2024 1055    BUN 12 12/24/2024 1055    CREATININE 0.68 12/24/2024 1055    Lab Results   Component Value Date/Time    CALCIUM 9.1 12/24/2024 1055    ALKPHOS 123 04/05/2024 1130    AST 35 04/05/2024 1130    ALT 26 10/15/2024 0842    BILITOT 0.9 04/05/2024 1130             ASSESSMENT/PLAN  Yamilex Chan is a 75yo female with Sjogren's disease, nonspecific colitis, HTN, HLD,  hypothyroidism who presents to clinic for follow-up of microscopic colitis. Colonoscopy with possible lymphocytic colitis. Patient is doing well on Budesonide.     -Decrease Budesonide to 6mg daily for 3 months.   -Judicious use of Imodium.   -Continue fiber supplementation.  -Discussed the importance of strict avoidance of dairy, pop, concentrated sugars, alcohol.   -Surveillance colonoscopy in 12/2025.   -Surveillance EGD in 12/2025 (HP polyps and family history of gastric cancer).   -Continue with psychology.   -Advised patient to work on continued exercise, stretching, stress reduction.    Total video visit time: 10 minutes.  Follow up in 3 months.    Jameson Strickland, DO

## 2025-03-13 ENCOUNTER — TELEPHONE (OUTPATIENT)
Dept: GASTROENTEROLOGY | Facility: CLINIC | Age: 75
End: 2025-03-13
Payer: MEDICARE

## 2025-03-23 DIAGNOSIS — K21.9 GASTROESOPHAGEAL REFLUX DISEASE WITHOUT ESOPHAGITIS: ICD-10-CM

## 2025-03-23 DIAGNOSIS — F41.8 SITUATIONAL ANXIETY: ICD-10-CM

## 2025-03-23 DIAGNOSIS — E03.9 ACQUIRED HYPOTHYROIDISM: ICD-10-CM

## 2025-03-24 RX ORDER — OMEPRAZOLE 40 MG/1
40 CAPSULE, DELAYED RELEASE ORAL 2 TIMES DAILY
Qty: 180 CAPSULE | Refills: 3 | Status: SHIPPED | OUTPATIENT
Start: 2025-03-24

## 2025-03-24 RX ORDER — LEVOTHYROXINE SODIUM 125 UG/1
125 TABLET ORAL
Qty: 90 TABLET | Refills: 1 | Status: SHIPPED | OUTPATIENT
Start: 2025-03-24

## 2025-03-24 RX ORDER — ESCITALOPRAM OXALATE 20 MG/1
20 TABLET ORAL DAILY
Qty: 90 TABLET | Refills: 1 | Status: SHIPPED | OUTPATIENT
Start: 2025-03-24

## 2025-03-25 DIAGNOSIS — K52.832 LYMPHOCYTIC COLITIS: ICD-10-CM

## 2025-03-25 RX ORDER — BUDESONIDE 3 MG/1
6 CAPSULE, COATED PELLETS ORAL EVERY MORNING
Qty: 180 CAPSULE | Refills: 0 | Status: SHIPPED | OUTPATIENT
Start: 2025-03-25 | End: 2026-03-25

## 2025-04-23 ENCOUNTER — APPOINTMENT (OUTPATIENT)
Dept: PRIMARY CARE | Facility: CLINIC | Age: 75
End: 2025-04-23
Payer: MEDICARE

## 2025-04-23 VITALS
BODY MASS INDEX: 34.59 KG/M2 | DIASTOLIC BLOOD PRESSURE: 76 MMHG | HEART RATE: 76 BPM | WEIGHT: 195.2 LBS | HEIGHT: 63 IN | SYSTOLIC BLOOD PRESSURE: 140 MMHG | OXYGEN SATURATION: 95 %

## 2025-04-23 DIAGNOSIS — D50.9 IRON DEFICIENCY ANEMIA, UNSPECIFIED IRON DEFICIENCY ANEMIA TYPE: Chronic | ICD-10-CM

## 2025-04-23 DIAGNOSIS — D64.9 MILD ANEMIA: Chronic | ICD-10-CM

## 2025-04-23 DIAGNOSIS — M77.8 ELBOW TENDINITIS: ICD-10-CM

## 2025-04-23 DIAGNOSIS — R73.03 PREDIABETES: ICD-10-CM

## 2025-04-23 DIAGNOSIS — G89.29 CHRONIC LEFT SHOULDER PAIN: ICD-10-CM

## 2025-04-23 DIAGNOSIS — F40.298 FEAR OF FALLING: Chronic | ICD-10-CM

## 2025-04-23 DIAGNOSIS — E55.9 VITAMIN D DEFICIENCY: ICD-10-CM

## 2025-04-23 DIAGNOSIS — I10 PRIMARY HYPERTENSION: ICD-10-CM

## 2025-04-23 DIAGNOSIS — E78.5 HYPERLIPIDEMIA, UNSPECIFIED HYPERLIPIDEMIA TYPE: Primary | ICD-10-CM

## 2025-04-23 DIAGNOSIS — E53.8 VITAMIN B12 DEFICIENCY: ICD-10-CM

## 2025-04-23 DIAGNOSIS — E03.9 ACQUIRED HYPOTHYROIDISM: ICD-10-CM

## 2025-04-23 DIAGNOSIS — M25.512 CHRONIC LEFT SHOULDER PAIN: ICD-10-CM

## 2025-04-23 DIAGNOSIS — K52.832 LYMPHOCYTIC COLITIS: ICD-10-CM

## 2025-04-23 DIAGNOSIS — R19.7 DIARRHEA, UNSPECIFIED TYPE: ICD-10-CM

## 2025-04-23 PROCEDURE — G2211 COMPLEX E/M VISIT ADD ON: HCPCS | Performed by: INTERNAL MEDICINE

## 2025-04-23 PROCEDURE — 3078F DIAST BP <80 MM HG: CPT | Performed by: INTERNAL MEDICINE

## 2025-04-23 PROCEDURE — 3008F BODY MASS INDEX DOCD: CPT | Performed by: INTERNAL MEDICINE

## 2025-04-23 PROCEDURE — 1157F ADVNC CARE PLAN IN RCRD: CPT | Performed by: INTERNAL MEDICINE

## 2025-04-23 PROCEDURE — 1160F RVW MEDS BY RX/DR IN RCRD: CPT | Performed by: INTERNAL MEDICINE

## 2025-04-23 PROCEDURE — 1123F ACP DISCUSS/DSCN MKR DOCD: CPT | Performed by: INTERNAL MEDICINE

## 2025-04-23 PROCEDURE — 1036F TOBACCO NON-USER: CPT | Performed by: INTERNAL MEDICINE

## 2025-04-23 PROCEDURE — 99215 OFFICE O/P EST HI 40 MIN: CPT | Performed by: INTERNAL MEDICINE

## 2025-04-23 PROCEDURE — 3077F SYST BP >= 140 MM HG: CPT | Performed by: INTERNAL MEDICINE

## 2025-04-23 PROCEDURE — 1159F MED LIST DOCD IN RCRD: CPT | Performed by: INTERNAL MEDICINE

## 2025-04-23 RX ORDER — AMOXICILLIN 500 MG/1
TABLET, FILM COATED ORAL
COMMUNITY
Start: 2025-04-05

## 2025-04-23 RX ORDER — DIPHENOXYLATE HYDROCHLORIDE AND ATROPINE SULFATE 2.5; .025 MG/1; MG/1
2 TABLET ORAL 3 TIMES DAILY PRN
Qty: 120 TABLET | Refills: 0 | Status: SHIPPED | OUTPATIENT
Start: 2025-04-23

## 2025-04-23 RX ORDER — LANOLIN ALCOHOL/MO/W.PET/CERES
1000 CREAM (GRAM) TOPICAL DAILY
Qty: 100 TABLET | Refills: 3 | Status: SHIPPED | OUTPATIENT
Start: 2025-04-23

## 2025-04-23 RX ORDER — LORAZEPAM 0.5 MG/1
0.5 TABLET ORAL DAILY PRN
Qty: 90 TABLET | Refills: 0 | Status: SHIPPED | OUTPATIENT
Start: 2025-04-23 | End: 2025-07-22

## 2025-04-23 RX ORDER — FERROUS SULFATE 325(65) MG
325 TABLET ORAL
Qty: 90 TABLET | Refills: 3 | Status: SHIPPED | OUTPATIENT
Start: 2025-04-23

## 2025-04-23 NOTE — PROGRESS NOTES
"Subjective   Patient ID: Yamilex Ye is a 74 y.o. female who presents for Follow-up.    Here for quarterly visit.  Scheduled to go to [place] in July with friends  Trying to exercise 3 times weekly  Left shoulder and elbow a bit sore no trauma  Continues to see her counselor Berta every 4 weeks         Review of Systems    Objective   /76 (BP Location: Left arm, Patient Position: Sitting, BP Cuff Size: Large adult)   Pulse 76   Ht 1.6 m (5' 3\")   Wt 88.5 kg (195 lb 3.2 oz)   SpO2 95%   BMI 34.58 kg/m²     Physical Exam  Vitals reviewed.   Constitutional:       Appearance: Normal appearance.   HENT:      Head: Normocephalic and atraumatic.   Eyes:      General: No scleral icterus.        Right eye: No discharge.         Left eye: No discharge.      Extraocular Movements: Extraocular movements intact.      Conjunctiva/sclera: Conjunctivae normal.      Pupils: Pupils are equal, round, and reactive to light.   Cardiovascular:      Rate and Rhythm: Normal rate and regular rhythm.      Pulses: Normal pulses.      Heart sounds: Normal heart sounds. No murmur heard.  Pulmonary:      Effort: Pulmonary effort is normal.      Breath sounds: Normal breath sounds. No wheezing or rhonchi.   Musculoskeletal:         General: No deformity or signs of injury. Normal range of motion.      Cervical back: Normal range of motion and neck supple. No rigidity or tenderness.      Comments: She walked with a bit of a limp, her baseline  She was concerned about swelling right lateral upper knee area-no obvious effusion noted  Left lateral wrist soft tissue swelling noted-the wrist itself was without synovitis  Shoulder on the left with near normal range of motion with abduction though a bit stiff near full abduction.  Internal range of motion was okay, external range of motion slightly diminished   Lymphadenopathy:      Cervical: No cervical adenopathy.   Skin:     General: Skin is warm and dry.      Findings: No rash. "   Neurological:      General: No focal deficit present.      Mental Status: She is alert and oriented to person, place, and time. Mental status is at baseline.      Cranial Nerves: No cranial nerve deficit.      Sensory: No sensory deficit.      Gait: Gait abnormal.   Psychiatric:         Mood and Affect: Mood normal.         Behavior: Behavior normal.         Thought Content: Thought content normal.         Judgment: Judgment normal.         Assessment/Plan   Problem List Items Addressed This Visit           ICD-10-CM    Mild anemia D64.9    Relevant Medications    ferrous sulfate (FerrouSuL) 325 mg (65 mg elemental) tablet    Other Relevant Orders    CBC    Hypothyroidism E03.9    Relevant Orders    TSH with reflex to Free T4 if abnormal    Vitamin B12 deficiency E53.8    Relevant Medications    cyanocobalamin (Vitamin B-12) 1,000 mcg tablet    Vitamin D deficiency E55.9    Relevant Orders    Vitamin D 25-Hydroxy,Total (for eval of Vitamin D levels)    Diarrhea R19.7    Relevant Medications    diphenoxylate-atropine (Lomotil) 2.5-0.025 mg tablet    Fear of falling F40.298    Relevant Medications    LORazepam (Ativan) 0.5 mg tablet    Prediabetes R73.03    Relevant Orders    Hemoglobin A1C    Basic Metabolic Panel     Other Visit Diagnoses         Codes      Hyperlipidemia, unspecified hyperlipidemia type    -  Primary E78.5    Relevant Orders    TSH with reflex to Free T4 if abnormal    Lipid Panel    Alanine Aminotransferase    CBC      Iron deficiency anemia, unspecified iron deficiency anemia type  (Chronic)    D50.9    Relevant Medications    ferrous sulfate (FerrouSuL) 325 mg (65 mg elemental) tablet      Lymphocytic colitis     K52.832      Primary hypertension     I10      Elbow tendinitis     M77.8    Relevant Orders    Referral to Physical Therapy      Chronic left shoulder pain     M25.512, G89.29    Relevant Orders    Referral to Physical Therapy             Portions of this encounter note have been  copied from my previous note dated  25 , which have been updated where appropriate and all reflect my current medical decision making from today.       We spoke for over 36 minutes.  I spent additional 5 minutes on documentation      Labs reviewed from 2024  Colonoscopy and EGD report reviewed from 2024      Benzodiazepines:  What is the patient's goal of therapy?   Improve anxiety  Is this being achieved with current treatment? Yes, with medication      CSA:  2024  PDMP:   2025  UDS:  23   ADRIANE-7:   23  (score =3)    Activities of Daily Living:   Is your overall impression that this patient is benefiting (symptom reduction outweighs side effects) from benzodiazepine therapy? Yes     1. Physical Functioning: Better  2. Family Relationship: Better  3. Social Relationship: Better  4. Mood: Better  5. Sleep Patterns: Better  6. Overall Function: Better    Situational anxiety / depression-very difficult for several years with her 's dementia declining course and subsequent passing. Support provided. She will continue the additional Lexapro as ordered. Her   mid 2022.           Doing better, but lonely often. Hard to meet companions.  She no longer sees her counselor. Support provided.  She does feel lonely.  She does not want to do anything in medical she states.  Suggested volunteering with people as she enjoys being around people.            3/24-it has been a challenging winter.  First she had multiple problems with her 3 facial Moh's procedures within 4 months since December, now she has to have her knee replaced, her family has been supportive she is looking forward to [facility] but it has been challenging alone since her                10/24-doing a bit better-volunteers at the [employer], she enjoys her pet dog- Farhad, walking 2-3 times weekly.  Looking forward to going out to [place] to see her granddaughters high school graduation  before she begins college studying art             1/25 She now follows with a therapist for depression and anxiety. She is volunteering once or twice a month at the [employer] club.  She gets to the gym to get out, 3 times weekly  Looking forward to going to [place] in March to see her brother who has been ill. Good friend, Sharda has had lots of issues-she has become more frail-unfortunately her son Irwin just passed away suddenly.  Another friend recently had a cardiac event  The winter has been a bit difficult             4/25-winter was hard, but she enjoys more sunshine as spring advances.  She continues to exercise.  She meets with Berta, her counselor every 4 weeks.  She remains compliant with her Lexapro daily and uses Xanax as needed.  Stressors remain her diet limitations.  She is very much looking forward to her trip to [place] July 6-16th.  Her niece lives there          Living situation-she is -lives alone with her dog Farhad.  She lives in a one-story home.  Her 2 daughters live in town    S/p      Right Total knee replacement  4/18/24 w/  Dr. Newman.  Doing much better.  She went through outpatient physical therapy with her therapist, Presley            7/24-she has been cleared to get back into the pool.  She has been walking her new dog more as well           10/24-presently walking 2 to 3 days weekly             4/25-she has Silver sneakers-encouraged her to continue to advance activity.  Walking 3 times a week presently      Hypertension-she will continue lisinopril            BP improved on recheck.             1/25-blood pressure elevated-she will advance the lisinopril and get a home blood pressure machine-suggested a reputable model to get and then she will follow her blood pressure at home and call if consistently over 130/80.  She will call in 4 weeks with her blood pressure readings that she does after lunch or after dinner alternating 3-4 times weekly.  Instructions written out to optimize  compliance.            4/25-blood pressure borderline.  Will continue to follow    Hyponatremia- mild; will follow             11/23-sodium 127-a bit down from 131 in May 2023                10/24-sodium 132               4/25-labs ordered for next time    Dyslipidemia-tolerating low-dose atorvastatin.  Goal LDL under 100             11/23-.  For now we will continue              10/24-LDL 91-              4/25-labs ordered    Prediabetes/elevated weight            11/23-fasting blood sugar 124, A1c 5.8%.  BMI 34.4 she will meet with our pharmacy team to consider one of the newer medications           10/24-fasting glucose 125, but A1c 5.1%.  BMI 32.9.  Will continue to follow               4/25-labs ordered BMI 34.6     Elevated 10 year cardiac risk assessment- 10 year cardiac risk assessment at 9.9% January 2019 reduces to 7.4% taking statin, 5.4% with statin and blood pressure Rx. She will continue lisinopril and atorvastatin. She will continue exercise when her schedule permits     Exercise routine-she will advance as tolerated.              She has been using a - Vel, and hopes to restart Silver Sneakers when she is able tolerate it.  In the past-encouraged water walking 3 x wk at [facility]              10/24-walking 2 to 3 days weekly with her dog              1/25-getting to the gym 3 times weekly              4/25-encouraged Silver sneaker options    Lymphedema- occasional while flying. Uses compression hoses. Not problematic at this time.      Hypothyroidism-we'll continue to follow thyroid labs with present thyroid supplement prescription.              10/24-TSH elevated but FTI normal.  Will plan to recheck  1/25-will continue to follow                4/25-labs ordered       Mild anemia-she will continue her iron and B12 and will reassess labs before follow-up            Improved on recheck-4/24 likely a postop issue after her knee replacement          Left knee  injury-November 2022-she has worked with Dr. Israel who did an MRI which shows nondisplaced tibial fracture at the insertion of the PCL, possibly a meniscal tear as well as patellar arthritis. She is doing physical therapy. Presently using a cane. She will continue caution. Handicap parking placard provided January 2023               Colitis/history of IBS / Recurrent diarrhea alternating with constipation- initially diagnosed as colitis- noted on early 2020 colonoscopy per Dr. Ibarra. Lexapro was discontinued. She has used Lomotil as needed from Dr. Ibarra. Colonoscopy updated January 2021- significantly improved she states.  With less stress, there is less diarrhea she notes. Overall improved she still uses the Lomotil occasionally. Encouraged dietary caution with her upcoming plans to visit a [employer] restaurant.                 She saw Dr. Strickland early May '23. He rec'd more metamucil - now on 2 cap/day. Colonoscopy planned for 2024. Mornings still challenging w/ am diarrhea.  She uses Lomotil-she will get refills from him.  Anticipates a colonoscopy in the spring 2024              She continues Lomotil as needed and we will see him in 3/24             10/24-she is still bothered by intermittent diarrhea-EGD and C-scope  12/24 with Dr. Strickland.  She gave up red wine to avoid sulfites.  Recently she had several bites of a pot pie which put her in the bathroom with diarrhea for a while.  Encouraged her to [place] back with her gastro doctor as this appears to be more of a trigger rather than say a lactose intolerance issue.  Lomotil refill requested/provided             1/25-she is on budesonide with some improved symptoms.  She will continue to follow with her gastro provider.  Colonoscopy updated early December 2024 4/25-she continues her very strict diet which helps move most part.  Lomotil still needed-requested refill-provided.  Annual colonoscopy planned December 2025      Acid reflux-stable  with PPI- which she will continue especially as she is on the naproxen           Anticipate EGD in 2024.  Smaller meals help             EGD  12/24.     Cholelithiasis- asymptomatic-noted on 2012 CT             10/24-interestingly recently she had prolonged diarrhea after eating several bites of a pot pie from a restaurant suggesting that maybe there is more of a issue with the gallstone.  Ultrasound and HIDA scan ordered              1/25-HIDA unremarkable.  Not having cholecystitis symptoms     Family history of colon cancer-she will continue colonoscopy care as below-at least every 5 years.    Colonoscopy updated January 2021. Next in 3 years-January 2024            She met w/ Dr. Strickland 3/24; she'll see him back at 6 mo, 9/24.                 Colonoscopy scheduled 12/24    History of Right lower quadrant abdominal wall hernia-surgery w/ Dr. Ravindra Louise, and Dr. Finnegan at [facility] Feb. 18. Improved         Left shoulder/left elbow strain-           4/25-she will work with her physical therapist    Lumbar spasms-mainly in the SI joint area-improved after working with her physical therapist, Presley for exercises    S/p lumbar surgery 4/17/19 per Dr. Ward for L 5 cyst--then her second lumbar surgery July 2020 laminectomy. Ongoing pain has been challenging. She will continue exercises, and follow-up with her physical therapist Presley as needed. She will see Dr. Ward as well. She will continue stretching.   X-rays completed per Dr. Ward. Unremarkable.         Gynecologic care / history of vaginal hysterectomy she will follow-up with GYN as needed     Annual mammogram-             January 2025 mammogram ok     Vitamin D deficiency- encouraged patient to continue vitamin D daily as ordered. Will consider vitamin D level regularly.     Vitamin B12 deficiency- improved on 01/23 labs.             B 12 still high in 5/23 - she'll reduce B 12 to every other day     Seasonal allergies-she will use Zyrtec  seasonally as needed        History of skin cancer/rosacea-  history of Mohs surgery, she understands importance of sunscreen   She continues to follow at [facility]-now working with Dr. Connie Kelly who does laser treatments on her cheeks mainly presently.             12/23-she has had Mohs surgery on her chin area which has been complicated by dehiscence requiring debridement.  She will continue to work with the dermatology department at [facility].  She states that she has a Mohs procedure between her eyebrows in January 2024              3/24; s/p 3 Mohs on face since 12/23 per Sarahi Tabor MD at [facility]. Follow up soon, w/ facial derm abraision soon             1/25-she continues to follow with dermatology at [facility] both with a general dermatologist-Dr. Connie Dominguez and her mohs MD. anticipating topical 5-FU for her nose lesion after her light therapy treatment for rosacea        Ophthalmology care-she will see Dr. Cade as scheduled. Sadly her right eye has chronically poor vision from a remote retinal vein occlusion. The left eye has dry eyes and she may need a keratotomy and contact lens implanted permanently.  Right cataract surgery completed 12/22, with noticeably better vision. Left scheduled for 2/23. Left blepharoplasty  3/23 per Dr. Lloyd.             Vision improved and she is pleased with the results at this. She will continue with Dr. Cade- now at [facility]              10/24-left corneal irritation continues-she will continue to follow-up with Dr. Long     Hearing concerns- audiology evaluation with audiology and ENT completed 12/22.      Flu shot each fall- updated 9/24     Covid series-completed. Covid Booster completed 9/24  RSV vaccination completed 7/24     Shingrix series completed in 2020    Tdap booster 7/24     Follow-up in 3 months, sooner with concerns     Charting was completed using voice recognition technology and may include unintended errors.

## 2025-05-14 ENCOUNTER — TELEPHONE (OUTPATIENT)
Dept: GASTROENTEROLOGY | Facility: CLINIC | Age: 75
End: 2025-05-14
Payer: MEDICARE

## 2025-05-14 DIAGNOSIS — D50.9 IRON DEFICIENCY ANEMIA, UNSPECIFIED IRON DEFICIENCY ANEMIA TYPE: Chronic | ICD-10-CM

## 2025-05-14 DIAGNOSIS — D64.9 MILD ANEMIA: Chronic | ICD-10-CM

## 2025-05-14 RX ORDER — FERROUS SULFATE 325(65) MG
325 TABLET ORAL
Qty: 90 TABLET | Refills: 3 | Status: SHIPPED | OUTPATIENT
Start: 2025-05-14

## 2025-05-14 NOTE — TELEPHONE ENCOUNTER
Patient called in. She sent message, but forgot to ask if you could fill her iron while Dr. Tristan is out of office?

## 2025-05-19 ENCOUNTER — TELEPHONE (OUTPATIENT)
Dept: GASTROENTEROLOGY | Facility: CLINIC | Age: 75
End: 2025-05-19
Payer: MEDICARE

## 2025-05-19 NOTE — TELEPHONE ENCOUNTER
This patient came in to the office today to schedule a virtual appointment with Dr. Strickland.  States that Dr. Strickland requested her to schedule a virtual appointment.  Not sure how long of an appoinment he might want her to have.  Can you please call this patient @ [phone] or  to get her scheduled?  Thanks.

## 2025-06-03 ENCOUNTER — APPOINTMENT (OUTPATIENT)
Dept: PRIMARY CARE | Facility: CLINIC | Age: 75
End: 2025-06-03
Payer: MEDICARE

## 2025-06-11 DIAGNOSIS — D64.9 MILD ANEMIA: Chronic | ICD-10-CM

## 2025-06-11 DIAGNOSIS — E53.8 VITAMIN B12 DEFICIENCY: ICD-10-CM

## 2025-06-11 DIAGNOSIS — D50.9 IRON DEFICIENCY ANEMIA, UNSPECIFIED IRON DEFICIENCY ANEMIA TYPE: Chronic | ICD-10-CM

## 2025-06-11 RX ORDER — LANOLIN ALCOHOL/MO/W.PET/CERES
1000 CREAM (GRAM) TOPICAL DAILY
Qty: 100 TABLET | Refills: 3 | Status: SHIPPED | OUTPATIENT
Start: 2025-06-11

## 2025-06-11 RX ORDER — FERROUS SULFATE 325(65) MG
325 TABLET ORAL
Qty: 90 TABLET | Refills: 3 | Status: SHIPPED | OUTPATIENT
Start: 2025-06-11

## 2025-07-02 ENCOUNTER — APPOINTMENT (OUTPATIENT)
Dept: GASTROENTEROLOGY | Facility: CLINIC | Age: 75
End: 2025-07-02
Payer: MEDICARE

## 2025-07-02 DIAGNOSIS — R19.7 DIARRHEA, UNSPECIFIED TYPE: ICD-10-CM

## 2025-07-02 DIAGNOSIS — K52.832 LYMPHOCYTIC COLITIS: Primary | ICD-10-CM

## 2025-07-02 PROCEDURE — 99213 OFFICE O/P EST LOW 20 MIN: CPT | Performed by: STUDENT IN AN ORGANIZED HEALTH CARE EDUCATION/TRAINING PROGRAM

## 2025-07-02 PROCEDURE — G2211 COMPLEX E/M VISIT ADD ON: HCPCS | Performed by: STUDENT IN AN ORGANIZED HEALTH CARE EDUCATION/TRAINING PROGRAM

## 2025-07-02 NOTE — PROGRESS NOTES
"CC: Follow-up.    History of Present Illness:   Yamilex Chan is a 73yo female with Sjogren's disease, nonspecific colitis, HTN, HLD, hypothyroidism who presents to clinic for follow up of microscopic colitis. Her diarrhea is mostly resolved. Patient complains of occasional constipation with overflow diarrhea. Occasional LLQ abdominal pain. Takes Lomotil 1-2x weekly. Not excited to stop it. Patient is on Budesonide (really helping). + worsening anxiety lately. Messes with her stools. Going to therapy. Still working out with a .  Patient is going to [place] with Sunday.      GI visit 3/2025: \"presents to clinic for follow-up of microscopic colitis. Patient has been on Budesonide for over 2 months. She is doing well. Rare diarrhea with eating the wrong item. She will still take imodium if traveling. Patient is seeing phycology. Patient is exercising regularly and PT. Both are helping her stress levels.\"    EGD 2024: Small HH, gastric erythema/polyps (HP).   Colonoscopy 2024: normal TI, 12 polyps, diverticulosis, hemorrhoids. ?lymphocytic colitis.   Colonoscopy 2021: Normal TI, diverticulosis focal active colitis in the ascending colon, otherwise biopsies are normal.    Review of Systems  ROS Negative unless otherwise stated above.    Past Medical/Surgical History  Past Medical History:   Diagnosis Date    Anxiety     Basal cell carcinoma     Body mass index (BMI) 34.0-34.9, adult 07/20/2021    BMI 34.0-34.9,adult    Cataract     Colon polyp     Depression     Encounter for general adult medical examination without abnormal findings 06/01/2018    Welcome to Medicare preventive visit    Encounter for general adult medical examination without abnormal findings 04/21/2017    Encounter for preventive health examination    Fatty (change of) liver, not elsewhere classified     Fatty liver    GERD (gastroesophageal reflux disease)     Hyperlipidemia     Hypertension     Hypothyroidism     Influenza due to other " identified influenza virus with other respiratory manifestations 03/25/2016    Influenza A    Irritable bowel syndrome     Other conditions influencing health status     Skin Cancer    Other general symptoms and signs 03/25/2016    Flu-like symptoms    Other injury of unspecified body region, initial encounter     Open wound    Perianal abscess 05/17/2023    Personal history of other diseases of the digestive system     History of small bowel obstruction    Personal history of other diseases of the digestive system     History of cholelithiasis    Personal history of other diseases of the digestive system     History of esophageal reflux    Personal history of other diseases of the digestive system     History of intestinal obstruction    Personal history of other endocrine, nutritional and metabolic disease     History of hypokalemia    Personal history of other infectious and parasitic diseases 02/28/2020    History of Clostridioides difficile colitis    Personal history of other specified conditions     History of fibrocystic disease of breast    Unilateral primary osteoarthritis, right knee 03/01/2024    Unspecified fracture of shaft of humerus, unspecified arm, initial encounter for closed fracture     Closed fracture of humerus    Urinary frequency       Past Surgical History:   Procedure Laterality Date    APPENDECTOMY  12/12/2014    Appendectomy    COLONOSCOPY  12/27/2012    Complete Colonoscopy    OOPHORECTOMY  2004    OTHER SURGICAL HISTORY  05/10/2019    Lumbar discectomy    OTHER SURGICAL HISTORY  12/12/2014    Anterior Colporrhaphy, Repair Of Cystocele    OTHER SURGICAL HISTORY  12/12/2014    Oophorectomy - Bilateral (Removal Of Both Ovaries)    OTHER SURGICAL HISTORY  12/12/2014    Adjacent Tissue Transfer    SKIN CANCER EXCISION  04/16/2016    Mohs Micrographic Surgery Face    SMALL INTESTINE SURGERY  12/12/2014    Small Bowel Resection    SPINAL FUSION      TOTAL VAGINAL HYSTERECTOMY  12/12/2014     Vaginal Hysterectomy        Social History   reports that she has quit smoking. Her smoking use included cigarettes. She has never used smokeless tobacco. She reports current alcohol use of about 3.0 standard drinks of alcohol per week. She reports that she does not use drugs.     Family History  family history includes Colon cancer (age of onset: 60 - 69) in her mother; Diabetes in her father; Hypertension in her father and mother; Stomach cancer (age of onset: 70 - 79) in her father.     Allergies  Allergies   Allergen Reactions    Adhesive Tape-Silicones Rash       Medications  Current Outpatient Medications   Medication Instructions    amoxicillin (Amoxil) 500 mg tablet TAKE 4 TABLETS BY MOUTH 1 HOUR PRIOR TO APPOINTMENT    atorvastatin (LIPITOR) 10 mg, oral, Daily, For cholesterol    budesonide EC (ENTOCORT EC) 6 mg, oral, Every morning    cholecalciferol (Vitamin D-3) 50 mcg (2,000 unit) capsule 2 tablets, Daily    cyanocobalamin (VITAMIN B-12) 1,000 mcg, oral, Daily    diphenoxylate-atropine (Lomotil) 2.5-0.025 mg tablet 2 tablets, oral, 3 times daily PRN    escitalopram (LEXAPRO) 20 mg, oral, Daily    ferrous sulfate (FerrouSuL) 325 mg (65 mg elemental) tablet 1 tablet, oral, Daily with breakfast    levothyroxine (SYNTHROID, LEVOXYL) 125 mcg, oral, Daily before breakfast    lisinopril 40 mg, oral, Daily, Each morning For blood pressure    LORazepam (ATIVAN) 0.5 mg, oral, Daily PRN    metroNIDAZOLE (Metrogel) 0.75 % gel 1 Application 2 times a day.    omeprazole (PRILOSEC) 40 mg, oral, 2 times daily    ondansetron (Zofran) 4 mg tablet 1 tablet, Every 8 hours PRN    polypodium leucotomos extract (Heliocare) 240 mg capsule 1 capsule, Daily    tiZANidine (ZANAFLEX) 4 mg, oral, Every 8 hours PRN    white petrolatum-mineral oiL 94-3 % ophthalmic ointment 1 Application, As needed        Objective   General: A&Ox3, NAD.  HEENT: AT/NC.   Skin: No Jaundice.   Neuro: No focal deficits.   Psych: Normal mood and affect.      Lab Results   Component Value Date    WBC 9.4 06/05/2024    HGB 12.9 06/05/2024    HCT 37.5 06/05/2024     (H) 06/05/2024     06/05/2024       Chemistry    Lab Results   Component Value Date/Time     (L) 12/24/2024 1055    K 4.3 12/24/2024 1055    CL 96 (L) 12/24/2024 1055    CO2 27 12/24/2024 1055    BUN 12 12/24/2024 1055    CREATININE 0.68 12/24/2024 1055    Lab Results   Component Value Date/Time    CALCIUM 9.1 12/24/2024 1055    ALKPHOS 123 04/05/2024 1130    AST 35 04/05/2024 1130    ALT 26 10/15/2024 0842    BILITOT 0.9 04/05/2024 1130             ASSESSMENT/PLAN  Yamilex Chan is a 75yo female with Sjogren's disease, nonspecific colitis, HTN, HLD, hypothyroidism who presents to clinic for follow-up of microscopic colitis. Colonoscopy with possible lymphocytic colitis. Patient is doing well on Budesonide.     -Decrease Budesonide to 3mg daily for 3 months.   -Judicious use of Lomotil (want patient to stop).   -Continue fiber supplementation.  -Discussed the importance of strict avoidance of dairy, pop, concentrated sugars, alcohol.   -Surveillance colonoscopy in 12/2025.   -Surveillance EGD in 12/2025 (HP polyps and family history of gastric cancer).   -Continue with psychology.   -Advised patient to work on continued exercise, stretching, stress reduction.    Total video visit time: 11 minutes.  Follow up in 3 months.    Jameson Strickland DO

## 2025-07-03 LAB
25(OH)D3+25(OH)D2 SERPL-MCNC: 58 NG/ML (ref 30–100)
ALT SERPL-CCNC: 22 U/L (ref 6–29)
ANION GAP SERPL CALCULATED.4IONS-SCNC: 14 MMOL/L (CALC) (ref 7–17)
BUN SERPL-MCNC: 8 MG/DL (ref 7–25)
BUN/CREAT SERPL: ABNORMAL (CALC) (ref 6–22)
CALCIUM SERPL-MCNC: 9.3 MG/DL (ref 8.6–10.4)
CHLORIDE SERPL-SCNC: 94 MMOL/L (ref 98–110)
CHOLEST SERPL-MCNC: 194 MG/DL
CHOLEST/HDLC SERPL: 2.6 (CALC)
CO2 SERPL-SCNC: 24 MMOL/L (ref 20–32)
CREAT SERPL-MCNC: 0.72 MG/DL (ref 0.6–1)
EGFRCR SERPLBLD CKD-EPI 2021: 89 ML/MIN/1.73M2
ERYTHROCYTE [DISTWIDTH] IN BLOOD BY AUTOMATED COUNT: 17 % (ref 11–15)
EST. AVERAGE GLUCOSE BLD GHB EST-MCNC: 117 MG/DL
EST. AVERAGE GLUCOSE BLD GHB EST-SCNC: 6.5 MMOL/L
GLUCOSE SERPL-MCNC: 92 MG/DL (ref 65–99)
HBA1C MFR BLD: 5.7 %
HCT VFR BLD AUTO: 32.3 % (ref 35–45)
HDLC SERPL-MCNC: 76 MG/DL
HGB BLD-MCNC: 10.6 G/DL (ref 11.7–15.5)
LDLC SERPL CALC-MCNC: 90 MG/DL (CALC)
MCH RBC QN AUTO: 31.2 PG (ref 27–33)
MCHC RBC AUTO-ENTMCNC: 32.8 G/DL (ref 32–36)
MCV RBC AUTO: 95 FL (ref 80–100)
NONHDLC SERPL-MCNC: 118 MG/DL (CALC)
PLATELET # BLD AUTO: 181 THOUSAND/UL (ref 140–400)
PMV BLD REES-ECKER: 9.3 FL (ref 7.5–12.5)
POTASSIUM SERPL-SCNC: 4.3 MMOL/L (ref 3.5–5.3)
RBC # BLD AUTO: 3.4 MILLION/UL (ref 3.8–5.1)
SODIUM SERPL-SCNC: 132 MMOL/L (ref 135–146)
TRIGL SERPL-MCNC: 183 MG/DL
TSH SERPL-ACNC: 3.62 MIU/L (ref 0.4–4.5)
WBC # BLD AUTO: 6.2 THOUSAND/UL (ref 3.8–10.8)

## 2025-07-17 ENCOUNTER — APPOINTMENT (OUTPATIENT)
Dept: PRIMARY CARE | Facility: CLINIC | Age: 75
End: 2025-07-17
Payer: MEDICARE

## 2025-07-17 DIAGNOSIS — E66.01 OBESITY, MORBID (MULTI): ICD-10-CM

## 2025-07-17 DIAGNOSIS — R60.9 EDEMA, UNSPECIFIED TYPE: Primary | ICD-10-CM

## 2025-07-17 DIAGNOSIS — E53.9 VITAMIN B DEFICIENCY, UNSPECIFIED: ICD-10-CM

## 2025-07-17 DIAGNOSIS — E53.8 VITAMIN B12 DEFICIENCY: ICD-10-CM

## 2025-07-17 DIAGNOSIS — K21.9 GASTROESOPHAGEAL REFLUX DISEASE WITHOUT ESOPHAGITIS: ICD-10-CM

## 2025-07-17 DIAGNOSIS — D50.9 IRON DEFICIENCY ANEMIA, UNSPECIFIED IRON DEFICIENCY ANEMIA TYPE: ICD-10-CM

## 2025-07-17 DIAGNOSIS — K52.832 LYMPHOCYTIC COLITIS: ICD-10-CM

## 2025-07-17 DIAGNOSIS — I10 BENIGN ESSENTIAL HYPERTENSION: ICD-10-CM

## 2025-07-17 DIAGNOSIS — F40.298 FEAR OF FALLING: Chronic | ICD-10-CM

## 2025-07-17 DIAGNOSIS — E03.9 ACQUIRED HYPOTHYROIDISM: ICD-10-CM

## 2025-07-17 DIAGNOSIS — Z23 IMMUNIZATION DUE: ICD-10-CM

## 2025-07-17 DIAGNOSIS — D64.9 MILD ANEMIA: ICD-10-CM

## 2025-07-17 DIAGNOSIS — R23.3 EASY BRUISABILITY: ICD-10-CM

## 2025-07-17 DIAGNOSIS — E87.1 HYPONATREMIA: ICD-10-CM

## 2025-07-17 PROCEDURE — 1159F MED LIST DOCD IN RCRD: CPT | Performed by: INTERNAL MEDICINE

## 2025-07-17 PROCEDURE — G2211 COMPLEX E/M VISIT ADD ON: HCPCS | Performed by: INTERNAL MEDICINE

## 2025-07-17 PROCEDURE — 3079F DIAST BP 80-89 MM HG: CPT | Performed by: INTERNAL MEDICINE

## 2025-07-17 PROCEDURE — 3074F SYST BP LT 130 MM HG: CPT | Performed by: INTERNAL MEDICINE

## 2025-07-17 PROCEDURE — 99215 OFFICE O/P EST HI 40 MIN: CPT | Performed by: INTERNAL MEDICINE

## 2025-07-17 PROCEDURE — 1160F RVW MEDS BY RX/DR IN RCRD: CPT | Performed by: INTERNAL MEDICINE

## 2025-07-17 RX ORDER — MUPIROCIN 20 MG/G
OINTMENT TOPICAL 3 TIMES DAILY
COMMUNITY
Start: 2025-06-16

## 2025-07-17 RX ORDER — TORSEMIDE 10 MG/1
10 TABLET ORAL DAILY
Qty: 30 TABLET | Refills: 11 | Status: SHIPPED | OUTPATIENT
Start: 2025-07-17 | End: 2026-07-17

## 2025-07-17 RX ORDER — BUDESONIDE 3 MG/1
3 CAPSULE, COATED PELLETS ORAL EVERY MORNING
Status: SHIPPED | COMMUNITY
Start: 2025-07-17

## 2025-07-17 RX ORDER — LORAZEPAM 0.5 MG/1
0.5 TABLET ORAL DAILY PRN
Qty: 45 TABLET | Refills: 0 | Status: SHIPPED | OUTPATIENT
Start: 2025-07-17 | End: 2025-10-15

## 2025-07-17 RX ORDER — PNEUMOCOCCAL 20-VALENT CONJUGATE VACCINE 2.2; 2.2; 2.2; 2.2; 2.2; 2.2; 2.2; 2.2; 2.2; 2.2; 2.2; 2.2; 2.2; 2.2; 2.2; 2.2; 4.4; 2.2; 2.2; 2.2 UG/.5ML; UG/.5ML; UG/.5ML; UG/.5ML; UG/.5ML; UG/.5ML; UG/.5ML; UG/.5ML; UG/.5ML; UG/.5ML; UG/.5ML; UG/.5ML; UG/.5ML; UG/.5ML; UG/.5ML; UG/.5ML; UG/.5ML; UG/.5ML; UG/.5ML; UG/.5ML
0.5 INJECTION, SUSPENSION INTRAMUSCULAR ONCE
Qty: 0.5 ML | Refills: 0 | Status: SHIPPED | OUTPATIENT
Start: 2025-07-17 | End: 2025-07-17

## 2025-07-17 RX ORDER — DOXYCYCLINE 100 MG/1
1 CAPSULE ORAL
COMMUNITY
Start: 2025-06-16 | End: 2025-07-17 | Stop reason: ALTCHOICE

## 2025-07-17 ASSESSMENT — PATIENT HEALTH QUESTIONNAIRE - PHQ9
1. LITTLE INTEREST OR PLEASURE IN DOING THINGS: NOT AT ALL
SUM OF ALL RESPONSES TO PHQ9 QUESTIONS 1 AND 2: 0
2. FEELING DOWN, DEPRESSED OR HOPELESS: NOT AT ALL

## 2025-07-17 NOTE — PROGRESS NOTES
Subjective   Patient ID: Yamilex Ye is a 74 y.o. female who presents for Follow-up.    Here for follow-up after her recent trip to [place] with friends from her [employer].  She returned late last night after a 7-hour flight  It was a challenging trip-she was in 5 hotels in 10 days.  Lots of walking-and consequently lots of feet swelling.  Her feet were bruising-as well.  She was taking aspirin daily.    She recently had a fall, she was walking due to her extremities office and missed a step falling forwards.         Review of Systems    Objective   /80   Pulse 77   Wt 92.5 kg (204 lb)   SpO2 96%   BMI 36.14 kg/m²     Physical Exam  Constitutional:       Comments: Well-appearing female in no distress  Eye exam revealed pupils equally round and reactive, with extraocular muscles intact. Normal sclera and eyelids.  Neck exam revealed no masses, adenopathy or thyromegaly. No neck pain on range of motion was noted.  Pulmonary exam revealed clear breath sounds bilaterally in all lung fields. No wheezes bronchitis or rales noted.  Cardiac exam revealed regular rate and rhythm without murmurs gallops or rubs.  Skin exam with scattered ecchymoses including on the dorsums of both feet  Bilateral pitting edema  Gait was symmetric  On mental status exam, judgment and insight appear intact. Alert and oriented x3. No apparent mood or cognitive impairment. Affect was bright, without evidence of depression or anxiety.     Skin:          Comments: 2 echymoses quarter sized right forearm  Larger left distal forearm echymoses  Left wrist skin abraision - grecia sized  Bilat calf echymoses- right medial, left lateral approx 2x2 cm  Echymoses between both toes, more extensive over left lateral dorsal foot           Assessment/Plan   Problem List Items Addressed This Visit           ICD-10-CM    Mild anemia D64.9    Relevant Orders    CBC    Folate    Reticulocytes    Iron and TIBC    Esophageal reflux K21.9    Relevant  Medications    budesonide EC (Entocort EC) 3 mg 24 hr capsule    Hypothyroidism E03.9    Vitamin B12 deficiency E53.8    Hyponatremia E87.1    Relevant Orders    Basic Metabolic Panel    Fear of falling F40.298    Relevant Medications    LORazepam (Ativan) 0.5 mg tablet     Other Visit Diagnoses         Codes      Edema, unspecified type    -  Primary R60.9    Relevant Medications    torsemide (Demadex) 10 mg tablet      Lymphocytic colitis     K52.832    Relevant Medications    budesonide EC (Entocort EC) 3 mg 24 hr capsule      Vitamin B deficiency, unspecified     E53.9    Relevant Orders    Vitamin B12      Easy bruisability     R23.3    Relevant Orders    Protime-INR    APTT      Iron deficiency anemia, unspecified iron deficiency anemia type     D50.9      Benign essential hypertension     I10    Relevant Medications    torsemide (Demadex) 10 mg tablet      Immunization due     Z23             Portions of this encounter note have been copied from my previous note dated  4/23/25 , which have been updated where appropriate and all reflect my current medical decision making from today.       We spoke over 32 minutes, over half the time counseling.  I spent over 8 additional minutes on documentation      Labs reviewed from 7/2/2025  EGD and colonoscopy reports from 12/24 reviewed    Benzodiazepines:  What is the patient's goal of therapy?   Improve anxiety  Is this being achieved with current treatment? Yes, with medication      CSA:  7/17/25  PDMP:   7/17/2025  UDS:  9/14/23   ADRIANE-7:   5/22/23  (score =3)    Activities of Daily Living:   Is your overall impression that this patient is benefiting (symptom reduction outweighs side effects) from benzodiazepine therapy? Yes     1. Physical Functioning: Better  2. Family Relationship: Better  3. Social Relationship: Better  4. Mood: Better  5. Sleep Patterns: Better  6. Overall Function: Better    Situational anxiety / depression-very difficult for several years with  her 's dementia declining course and subsequent passing. Support provided. She will continue the additional Lexapro as ordered. Her   mid 2022.           Doing better, but lonely often. Hard to meet companions.  She no longer sees her counselor. Support provided.  She does feel lonely.  She does not want to do anything in medical she states.  Suggested volunteering with people as she enjoys being around people.            3/24-it has been a challenging winter.  First she had multiple problems with her 3 facial Moh's procedures within 4 months since December, now she has to have her knee replaced, her family has been supportive she is looking forward to [facility] but it has been challenging alone since her                10/24-doing a bit better-volunteers at the [employer], she enjoys her pet dog- Farhad, walking 2-3 times weekly.  Looking forward to going out to [place] to see her granddaughters high school graduation before she begins college studying art              She now follows with a therapist for depression and anxiety. She is volunteering once or twice a month at the [employer].  She gets to the gym to get out, 3 times weekly  Looking forward to going to [place] in March to see her brother who has been ill. Good friend, Sharda has had lots of issues-she has become more frail-unfortunately her son Irwin just passed away suddenly.  Another friend recently had a cardiac event  The winter has been a bit difficult             -winter was hard, but she enjoys more sunshine as spring advances.  She continues to exercise.  She meets with Berta, her counselor every 4 weeks.  She remains compliant with her Lexapro daily and uses Xanax as needed.  Stressors remain her diet limitations.  She is very much looking forward to her trip to [place] .  Her niece lives there             -she enjoyed her trip to [place] with the [employer] group but she missed her dog.   She is glad to be home it was a difficult trip, 5 hotels in 10 days and lots of walking.  Controlled substance agreement updated.  Lorazepam refill provided.  She also follows with her counselor Berta, monthly          Living situation-she is -lives alone with her dog Farhad.  She lives in a one-story home.  Her 2 daughters live in town        S/p      Right Total knee replacement  4/18/24 w/  Dr. Newman.  Doing much better.  She went through outpatient physical therapy with her therapist, Presley            7/24-she has been cleared to get back into the pool.  She has been walking her new dog more as well           10/24-presently walking 2 to 3 days weekly             4/25-she has Silver sneakers-encouraged her to continue to advance activity.  Walking 3 times a week presently              7/25-fortunately her right knee did well on the trip    Recent fall-late June 2025-she fell walking into her 's office, when she missed a step.  Right foot laceration.  She will continue caution walking    Left forearm abrasion-after dog scratch-she had been on doxycycline orally-encouraged ongoing mupirocin and dressing changes.  Her tetanus is up-to-date    Pedal edema-             7/17/2025-significantly worsened after her trip to [place].  She will start low-dose torsemide and check a BMP over the next 7 to 10 days.  She will continue low-salt diet    Hypertension-she will continue lisinopril            BP improved on recheck.             1/25-blood pressure elevated-she will advance the lisinopril and get a home blood pressure machine-suggested a reputable model to get and then she will follow her blood pressure at home and call if consistently over 130/80.  She will call in 4 weeks with her blood pressure readings that she does after lunch or after dinner alternating 3-4 times weekly.  Instructions written out to optimize compliance.            4/25-blood pressure borderline.  Will continue to follow               7/25-blood pressure much improved on recheck    Hyponatremia- mild; will follow             11/23-sodium 127-a bit down from 131 in May 2023                10/24-sodium 132               7/25-sodium slightly low at 132.  Will continue to follow    Dyslipidemia-tolerating low-dose atorvastatin.  Goal LDL under 100             11/23-.  For now we will continue              10/24-LDL 91-              7/25-LDL 90    Prediabetes/elevated weight            11/23-fasting blood sugar 124, A1c 5.8%.  BMI 34.4 she will meet with our pharmacy team to consider one of the newer medications           10/24-fasting glucose 125, but A1c 5.1%.  BMI 32.9.  Will continue to follow               4/25-labs ordered BMI 34.6               7/25-A1c 5.7%.  BMI 36.1.  Unfortunately weight up 9 pounds with her trip     Elevated 10 year cardiac risk assessment- 10 year cardiac risk assessment at 9.9% January 2019 reduces to 7.4% taking statin, 5.4% with statin and blood pressure Rx. She will continue lisinopril and atorvastatin. She will continue exercise when her schedule permits     Exercise routine-she will advance as tolerated.              She has been using a - Vel, and hopes to restart Silver Sneakers when she is able tolerate it.  In the past-encouraged water walking 3 x wk at [facility]'s pool              10/24-walking 2 to 3 days weekly with her dog              1/25-getting to the gym 3 times weekly              4/25-encouraged Silver sneaker options    Lymphedema- occasional while flying. Uses compression hoses. Not problematic at this time.      Hypothyroidism-we'll continue to follow thyroid labs with present thyroid supplement prescription.              10/24-TSH elevated but FTI normal.  Will plan to recheck  1/25-will continue to follow                4/25-labs ordered       Mild anemia-she will continue her iron and B12 and will reassess labs before follow-up            Improved on  recheck-4/24 likely a postop issue after her knee replacement               7/25-show reassess her CBC and secondary labs understanding of her anemia is not from vitamin deficiencies-she will need to set up hematology consultation          Left knee injury-November 2022-she has worked with Dr. Israel who did an MRI which shows nondisplaced tibial fracture at the insertion of the PCL, possibly a meniscal tear as well as patellar arthritis. She is doing physical therapy. Presently using a cane. She will continue caution. Handicap parking placard provided January 2023 7/25-presently improved               Colitis/history of IBS / Recurrent diarrhea alternating with constipation- initially diagnosed as colitis- noted on early 2020 colonoscopy per Dr. Ibarra. Lexapro was discontinued. She has used Lomotil as needed from Dr. Ibarra. Colonoscopy updated January 2021- significantly improved she states.  With less stress, there is less diarrhea she notes. Overall improved she still uses the Lomotil occasionally. Encouraged dietary caution with her upcoming plans to visit a [employer] restaurant.                 She saw Dr. Strickland early May '23. He rec'd more metamucil - now on 2 cap/day. Colonoscopy planned for 2024. Mornings still challenging w/ am diarrhea.  She uses Lomotil-she will get refills from him.  Anticipates a colonoscopy in the spring 2024              She continues Lomotil as needed and we will see him in 3/24             10/24-she is still bothered by intermittent diarrhea-EGD and C-scope  12/24 with Dr. Strickland.  She gave up red wine to avoid sulfites.  Recently she had several bites of a pot pie which put her in the bathroom with diarrhea for a while.  Encouraged her to [place] back with her gastro doctor as this appears to be more of a trigger rather than say a lactose intolerance issue.  Lomotil refill requested/provided             1/25-she is on budesonide with some improved symptoms.  She  will continue to follow with her gastro provider.  Colonoscopy updated early December 2024 4/25-she continues her very strict diet which helps move most part.  Lomotil still needed-requested refill-provided.  Annual colonoscopy planned December 2025      Acid reflux-stable with PPI- which she will continue especially as she is on the naproxen           Anticipate EGD in 2024.  Smaller meals help             EGD  12/24 small hiatal hernia multiple polyps encouraged to avoid NSAIDs and antireflux lifestyle encouraged     Cholelithiasis- asymptomatic-noted on 2012 CT             10/24-interestingly recently she had prolonged diarrhea after eating several bites of a pot pie from a restaurant suggesting that maybe there is more of a issue with the gallstone.  Ultrasound and HIDA scan ordered              1/25-HIDA unremarkable.  Not having cholecystitis symptoms     Colon polyps with family history of colon cancer-she will continue colonoscopy care as below-at least every 5 years.    Colonoscopy updated January 2021. Next in 3 years-January 2024            She met w/ Dr. Strickland 3/24; she'll see him back at 6 mo, 9/24.                 Colonoscopy updated 12/24-multiple polyps             Next colonoscopy in 1 year-12/25      History of Right lower quadrant abdominal wall hernia-surgery w/ Dr. Ravindra Louise, and Dr. Finnegan at [facility] Feb. 18. Improved         Left shoulder/left elbow strain-           4/25-she will work with her physical therapist    Lumbar spasms-mainly in the SI joint area-improved after working with her physical therapist, Presley for exercises    S/p lumbar surgery 4/17/19 per Dr. Ward for L 5 cyst--then her second lumbar surgery July 2020 laminectomy. Ongoing pain has been challenging. She will continue exercises, and follow-up with her physical therapist Presley as needed. She will see Dr. Ward as well. She will continue stretching.   X-rays completed per Dr. Ward.  Unremarkable.         Gynecologic care / history of vaginal hysterectomy she will follow-up with GYN as needed     Annual mammogram-             January 2025 mammogram ok     Vitamin D deficiency- encouraged patient to continue vitamin D daily as ordered. Will consider vitamin D level regularly.     Vitamin B12 deficiency- improved on 01/23 labs.             B 12 still high in 5/23 - she'll reduce B 12 to every other day     Seasonal allergies-she will use Zyrtec seasonally as needed       Multiple ecchymoses-during her trip to [place]-she was on aspirin daily-she has since stopped this.  She will check labs including CBC, PT and PTT.  She will review with her dermatologist, Dr. Connie Santillan had appointment scheduled soon       History of skin cancer/rosacea-  history of Mohs surgery, she understands importance of sunscreen   She continues to follow at [facility]-now working with Dr. Connie Kelly who does laser treatments on her cheeks mainly presently.             12/23-she has had Mohs surgery on her chin area which has been complicated by dehiscence requiring debridement.  She will continue to work with the dermatology department at [facility].  She states that she has a Mohs procedure between her eyebrows in January 2024              3/24; s/p 3 Mohs on face since 12/23 per Sarahi Tabor MD at [facility]. Follow up soon, w/ facial derm abraision soon             1/25-she continues to follow with dermatology at [facility] both with a general dermatologist-Dr. Connie Dominguez and her mohs MD. anticipating topical 5-FU for her nose lesion after her light therapy treatment for rosacea             7/25-she is scheduled to see her dermatologist soon        Ophthalmology care-she will see Dr. Cade as scheduled. Sadly her right eye has chronically poor vision from a remote retinal vein occlusion. The left eye has dry eyes and she may need a keratotomy and contact lens implanted permanently.  Right cataract  surgery completed 12/22, with noticeably better vision. Left scheduled for 2/23. Left blepharoplasty  3/23 per Dr. Lloyd.             Vision improved and she is pleased with the results at this. She will continue with Dr. Cade- now at [facility]              10/24-left corneal irritation continues-she will continue to follow-up with Dr. Long     Hearing concerns- audiology evaluation with audiology and ENT completed 12/22.      Flu shot each fall- updated 9/24     Covid series-completed. Covid Booster completed 9/24    RSV vaccination completed 7/24     Shingrix series completed in 2020    Prevnar 20-suggested 7/25    Tdap booster 7/24     Follow-up in 3 months, sooner with concerns     Charting was completed using voice recognition technology and may include unintended errors.

## 2025-07-19 VITALS
HEART RATE: 77 BPM | SYSTOLIC BLOOD PRESSURE: 128 MMHG | WEIGHT: 204 LBS | OXYGEN SATURATION: 96 % | DIASTOLIC BLOOD PRESSURE: 80 MMHG | BODY MASS INDEX: 36.14 KG/M2

## 2025-07-26 LAB
ANION GAP SERPL CALCULATED.4IONS-SCNC: 15 MMOL/L (CALC) (ref 7–17)
APTT PPP: 25 SEC (ref 23–32)
BUN SERPL-MCNC: 14 MG/DL (ref 7–25)
BUN/CREAT SERPL: ABNORMAL (CALC) (ref 6–22)
CALCIUM SERPL-MCNC: 9.4 MG/DL (ref 8.6–10.4)
CHLORIDE SERPL-SCNC: 92 MMOL/L (ref 98–110)
CO2 SERPL-SCNC: 21 MMOL/L (ref 20–32)
CREAT SERPL-MCNC: 0.72 MG/DL (ref 0.6–1)
EGFRCR SERPLBLD CKD-EPI 2021: 89 ML/MIN/1.73M2
ERYTHROCYTE [DISTWIDTH] IN BLOOD BY AUTOMATED COUNT: 17.6 % (ref 11–15)
FOLATE SERPL-MCNC: >24 NG/ML
GLUCOSE SERPL-MCNC: 124 MG/DL (ref 65–139)
HCT VFR BLD AUTO: 31.4 % (ref 35–45)
HGB BLD-MCNC: 10.5 G/DL (ref 11.7–15.5)
INR PPP: 1
IRON SATN MFR SERPL: 71 % (CALC) (ref 16–45)
IRON SERPL-MCNC: 297 MCG/DL (ref 45–160)
MCH RBC QN AUTO: 33 PG (ref 27–33)
MCHC RBC AUTO-ENTMCNC: 33.4 G/DL (ref 32–36)
MCV RBC AUTO: 98.7 FL (ref 80–100)
PLATELET # BLD AUTO: 212 THOUSAND/UL (ref 140–400)
PMV BLD REES-ECKER: 9.4 FL (ref 7.5–12.5)
POTASSIUM SERPL-SCNC: 4.6 MMOL/L (ref 3.5–5.3)
PROTHROMBIN TIME: 10.5 SEC (ref 9–11.5)
RBC # BLD AUTO: 3.18 MILLION/UL (ref 3.8–5.1)
RETICS #: ABNORMAL CELLS/UL (ref 20000–80000)
RETICS/RBC NFR AUTO: 3 %
SODIUM SERPL-SCNC: 128 MMOL/L (ref 135–146)
TIBC SERPL-MCNC: 416 MCG/DL (CALC) (ref 250–450)
VIT B12 SERPL-MCNC: 1078 PG/ML (ref 200–1100)
WBC # BLD AUTO: 8.7 THOUSAND/UL (ref 3.8–10.8)

## 2025-07-29 ENCOUNTER — APPOINTMENT (OUTPATIENT)
Dept: PRIMARY CARE | Facility: CLINIC | Age: 75
End: 2025-07-29
Payer: MEDICARE

## 2025-09-04 ENCOUNTER — APPOINTMENT (OUTPATIENT)
Dept: PRIMARY CARE | Facility: CLINIC | Age: 75
End: 2025-09-04
Payer: MEDICARE

## 2025-09-04 ASSESSMENT — ACTIVITIES OF DAILY LIVING (ADL)
BATHING: INDEPENDENT
DOING_HOUSEWORK: INDEPENDENT
DRESSING: INDEPENDENT
TAKING_MEDICATION: INDEPENDENT
MANAGING_FINANCES: INDEPENDENT
GROCERY_SHOPPING: INDEPENDENT

## 2025-09-04 ASSESSMENT — ENCOUNTER SYMPTOMS
LOSS OF SENSATION IN FEET: 0
DEPRESSION: 0
OCCASIONAL FEELINGS OF UNSTEADINESS: 1

## 2025-09-05 DIAGNOSIS — F41.8 SITUATIONAL ANXIETY: ICD-10-CM

## 2025-09-05 DIAGNOSIS — E03.9 ACQUIRED HYPOTHYROIDISM: ICD-10-CM

## 2025-09-05 ASSESSMENT — ACTIVITIES OF DAILY LIVING (ADL)
JUDGMENT_ADEQUATE_SAFELY_COMPLETE_DAILY_ACTIVITIES: YES
ADEQUATE_TO_COMPLETE_ADL: YES
GROOMING: INDEPENDENT
TOILETING: INDEPENDENT
FEEDING YOURSELF: INDEPENDENT
ASSISTIVE_DEVICE: EYEGLASSES
PATIENT'S MEMORY ADEQUATE TO SAFELY COMPLETE DAILY ACTIVITIES?: YES

## 2025-09-06 RX ORDER — ESCITALOPRAM OXALATE 20 MG/1
20 TABLET ORAL DAILY
Qty: 90 TABLET | Refills: 1 | Status: SHIPPED | OUTPATIENT
Start: 2025-09-06

## 2025-09-06 RX ORDER — LEVOTHYROXINE SODIUM 125 UG/1
125 TABLET ORAL
Qty: 90 TABLET | Refills: 1 | Status: SHIPPED | OUTPATIENT
Start: 2025-09-06

## 2025-09-18 ENCOUNTER — APPOINTMENT (OUTPATIENT)
Dept: HEMATOLOGY/ONCOLOGY | Facility: CLINIC | Age: 75
End: 2025-09-18
Payer: MEDICARE

## 2025-10-15 ENCOUNTER — APPOINTMENT (OUTPATIENT)
Dept: GASTROENTEROLOGY | Facility: CLINIC | Age: 75
End: 2025-10-15
Payer: MEDICARE

## 2025-12-02 ENCOUNTER — APPOINTMENT (OUTPATIENT)
Dept: PRIMARY CARE | Facility: CLINIC | Age: 75
End: 2025-12-02
Payer: MEDICARE

## 2026-03-10 ENCOUNTER — APPOINTMENT (OUTPATIENT)
Dept: PRIMARY CARE | Facility: CLINIC | Age: 76
End: 2026-03-10
Payer: MEDICARE

## 2026-06-16 ENCOUNTER — APPOINTMENT (OUTPATIENT)
Dept: PRIMARY CARE | Facility: CLINIC | Age: 76
End: 2026-06-16
Payer: MEDICARE

## 2026-09-08 ENCOUNTER — APPOINTMENT (OUTPATIENT)
Dept: PRIMARY CARE | Facility: CLINIC | Age: 76
End: 2026-09-08
Payer: MEDICARE

## (undated) DEVICE — GLOVE, SURGICAL, PROTEXIS PI , 7.5, PF, LF

## (undated) DEVICE — GLOVE, SURGICAL, PROTEXIS PI BLUE W/NEUTHERA, 7.5, PF, LF

## (undated) DEVICE — BANDAGE, COMPRESSION, W/CLIP, FLEX-MASTER, DOUBLE LENGTH, 6 IN X 11 YD, LF

## (undated) DEVICE — BLADE, STRYKER, OSC, 19 X 90 X 1.27G

## (undated) DEVICE — DRAPE, INSTRUMENT, W/POUCH, STERI DRAPE, 7 X 11 IN, DISPOSABLE, STERILE

## (undated) DEVICE — SUTURE, MONOCRYL, 3-0, PS-1 27IN, UNDYED

## (undated) DEVICE — SOLUTION, IRRIGATION, USP, SODIUM CHLORIDE 0.9%, 3000 ML, BAG

## (undated) DEVICE — TISSUE ADHESIVE, PREMIERPRO EXOFIN, PRECISION PEN HV, 1.0ML

## (undated) DEVICE — DRESSING, MEPILEX BORDER, POST-OP AG, 4 X 10 IN

## (undated) DEVICE — SOLUTION, IRRIGATION, STERILE WATER, 1000 ML, POUR BOTTLE

## (undated) DEVICE — SYRINGE, 60 CC, LUER LOCK, MONOJECT, W/O CAP, LF

## (undated) DEVICE — MARKER, SKIN, DUAL TIP, W/RULER AND LABEL

## (undated) DEVICE — SUTURE, STRATAFIX, 1 SYMMETRIC, PDS PLUS, 60CM, CTX, VIOLET

## (undated) DEVICE — SUTURE, VICRYL, 1, 27 IN, CT-1 CR, UNDYED

## (undated) DEVICE — PAD, KNEE PATIENT, DEMAYO, DISP, STERILE

## (undated) DEVICE — TIP, SUCTION, YANKAUER, FLEXIBLE

## (undated) DEVICE — SUTURE, VICRYL, 2-0, 36 IN, CT-1, UNDYED

## (undated) DEVICE — GLOVE, SURGICAL, PROTEXIS PI W/NEU-THERA, 8.0, PF, LF

## (undated) DEVICE — TOWEL PACK, STERILE, 4/PACK, BLUE

## (undated) DEVICE — GOWN, SURGICAL, NON-REINFORCED, XLARGE, LF

## (undated) DEVICE — WOUND SYSTEM, DEBRIDEMENT & CLEANING, O.R DUOPAK

## (undated) DEVICE — HOOD, STERISHIELD T4 SYSTEM

## (undated) DEVICE — NEEDLE, HYPODERMIC, SPECIALTY, REGULAR WALL, SHORT BEVEL, 18 G X 1.5 IN

## (undated) DEVICE — Device